# Patient Record
Sex: FEMALE | Race: WHITE | Employment: FULL TIME | URBAN - NONMETROPOLITAN AREA
[De-identification: names, ages, dates, MRNs, and addresses within clinical notes are randomized per-mention and may not be internally consistent; named-entity substitution may affect disease eponyms.]

---

## 2018-04-15 ENCOUNTER — HOSPITAL ENCOUNTER (OUTPATIENT)
Age: 58
Discharge: HOME | End: 2018-04-15
Payer: COMMERCIAL

## 2018-04-15 LAB
ALBUMIN SERPL-MCNC: 3.9 G/DL (ref 3.5–5.2)
ALBUMIN/GLOBULIN RATIO: ABNORMAL (ref 1–2.5)
ALP BLD-CCNC: 117 U/L (ref 35–104)
ALT SERPL-CCNC: 21 U/L (ref 5–33)
ANION GAP SERPL CALCULATED.3IONS-SCNC: 13 MMOL/L (ref 9–17)
AST SERPL-CCNC: 15 U/L
BILIRUB SERPL-MCNC: 0.55 MG/DL (ref 0.3–1.2)
BUN BLDV-MCNC: 16 MG/DL (ref 6–20)
BUN/CREAT BLD: 27 (ref 9–20)
CALCIUM SERPL-MCNC: 8.9 MG/DL (ref 8.6–10.4)
CHLORIDE BLD-SCNC: 100 MMOL/L (ref 98–107)
CO2: 27 MMOL/L (ref 20–31)
CREAT SERPL-MCNC: 0.59 MG/DL (ref 0.5–0.9)
FOLATE: 14.5 NG/ML
GFR AFRICAN AMERICAN: >60 ML/MIN
GFR NON-AFRICAN AMERICAN: >60 ML/MIN
GFR SERPL CREATININE-BSD FRML MDRD: ABNORMAL ML/MIN/{1.73_M2}
GFR SERPL CREATININE-BSD FRML MDRD: ABNORMAL ML/MIN/{1.73_M2}
GLUCOSE BLD-MCNC: 222 MG/DL (ref 70–99)
HCT VFR BLD CALC: 41.5 % (ref 36–46)
HEMOGLOBIN: 14 G/DL (ref 12–16)
MCH RBC QN AUTO: 29.1 PG (ref 26–34)
MCHC RBC AUTO-ENTMCNC: 33.7 G/DL (ref 31–37)
MCV RBC AUTO: 86.4 FL (ref 80–100)
NRBC AUTOMATED: NORMAL PER 100 WBC
PDW BLD-RTO: 13.7 % (ref 12.1–15.2)
PLATELET # BLD: 231 K/UL (ref 140–450)
PMV BLD AUTO: NORMAL FL (ref 6–12)
POTASSIUM SERPL-SCNC: 4 MMOL/L (ref 3.7–5.3)
RBC # BLD: 4.81 M/UL (ref 4–5.2)
SODIUM BLD-SCNC: 140 MMOL/L (ref 135–144)
THYROXINE, FREE: 1.2 NG/DL (ref 0.93–1.7)
TOTAL PROTEIN: 7.3 G/DL (ref 6.4–8.3)
TSH SERPL DL<=0.05 MIU/L-ACNC: 0.01 MIU/L (ref 0.3–5)
VITAMIN B-12: 282 PG/ML (ref 232–1245)
VITAMIN D 25-HYDROXY: 17.3 NG/ML (ref 30–100)
WBC # BLD: 6.3 K/UL (ref 3.5–11)

## 2018-04-15 PROCEDURE — 84432 ASSAY OF THYROGLOBULIN: CPT

## 2018-04-15 PROCEDURE — 85027 COMPLETE CBC AUTOMATED: CPT

## 2018-04-15 PROCEDURE — 86800 THYROGLOBULIN ANTIBODY: CPT

## 2018-04-15 PROCEDURE — 82306 VITAMIN D 25 HYDROXY: CPT

## 2018-04-15 PROCEDURE — 80053 COMPREHEN METABOLIC PANEL: CPT

## 2018-04-15 PROCEDURE — 82607 VITAMIN B-12: CPT

## 2018-04-15 PROCEDURE — 84439 ASSAY OF FREE THYROXINE: CPT

## 2018-04-15 PROCEDURE — 36415 COLL VENOUS BLD VENIPUNCTURE: CPT

## 2018-04-15 PROCEDURE — 84443 ASSAY THYROID STIM HORMONE: CPT

## 2018-04-15 PROCEDURE — 82746 ASSAY OF FOLIC ACID SERUM: CPT

## 2018-04-16 LAB
THYROGLOBULIN AB: <20 IU/ML (ref 0–40)
THYROGLOBULIN: <0.2 NG/ML (ref 0–63.4)

## 2018-07-21 ENCOUNTER — HOSPITAL ENCOUNTER (OUTPATIENT)
Age: 58
Discharge: HOME | End: 2018-07-21
Payer: COMMERCIAL

## 2018-07-21 LAB
ALBUMIN SERPL-MCNC: 4.1 G/DL (ref 3.5–5.2)
ALBUMIN/GLOBULIN RATIO: ABNORMAL (ref 1–2.5)
ALP BLD-CCNC: 106 U/L (ref 35–104)
ALT SERPL-CCNC: 16 U/L (ref 5–33)
ANION GAP SERPL CALCULATED.3IONS-SCNC: 16 MMOL/L (ref 9–17)
AST SERPL-CCNC: 12 U/L
BILIRUB SERPL-MCNC: 0.4 MG/DL (ref 0.3–1.2)
BUN BLDV-MCNC: 16 MG/DL (ref 6–20)
BUN/CREAT BLD: 25 (ref 9–20)
CALCIUM SERPL-MCNC: 9.5 MG/DL (ref 8.6–10.4)
CHLORIDE BLD-SCNC: 98 MMOL/L (ref 98–107)
CO2: 25 MMOL/L (ref 20–31)
CREAT SERPL-MCNC: 0.65 MG/DL (ref 0.5–0.9)
ESTIMATED AVERAGE GLUCOSE: 171 MG/DL
GFR AFRICAN AMERICAN: >60 ML/MIN
GFR NON-AFRICAN AMERICAN: >60 ML/MIN
GFR SERPL CREATININE-BSD FRML MDRD: ABNORMAL ML/MIN/{1.73_M2}
GFR SERPL CREATININE-BSD FRML MDRD: ABNORMAL ML/MIN/{1.73_M2}
GLUCOSE BLD-MCNC: 165 MG/DL (ref 70–99)
HBA1C MFR BLD: 7.6 % (ref 4.8–5.9)
POTASSIUM SERPL-SCNC: 4.4 MMOL/L (ref 3.7–5.3)
SODIUM BLD-SCNC: 139 MMOL/L (ref 135–144)
THYROXINE, FREE: 1.37 NG/DL (ref 0.93–1.7)
TOTAL PROTEIN: 7.7 G/DL (ref 6.4–8.3)
TSH SERPL DL<=0.05 MIU/L-ACNC: 0.02 MIU/L (ref 0.3–5)
VITAMIN D 25-HYDROXY: 26.8 NG/ML (ref 30–100)

## 2018-07-21 PROCEDURE — 84439 ASSAY OF FREE THYROXINE: CPT

## 2018-07-21 PROCEDURE — 80053 COMPREHEN METABOLIC PANEL: CPT

## 2018-07-21 PROCEDURE — 83036 HEMOGLOBIN GLYCOSYLATED A1C: CPT

## 2018-07-21 PROCEDURE — 84443 ASSAY THYROID STIM HORMONE: CPT

## 2018-07-21 PROCEDURE — 36415 COLL VENOUS BLD VENIPUNCTURE: CPT

## 2018-07-21 PROCEDURE — 82306 VITAMIN D 25 HYDROXY: CPT

## 2018-07-25 ENCOUNTER — HOSPITAL ENCOUNTER (OUTPATIENT)
Dept: MAMMOGRAPHY | Age: 58
Discharge: HOME | End: 2018-07-27
Payer: COMMERCIAL

## 2018-07-25 DIAGNOSIS — Z12.39 BREAST CANCER SCREENING: ICD-10-CM

## 2018-07-25 PROCEDURE — 77067 SCR MAMMO BI INCL CAD: CPT

## 2018-08-09 ENCOUNTER — HOSPITAL ENCOUNTER (OUTPATIENT)
Dept: PREADMISSION TESTING | Age: 58
Discharge: HOME | End: 2018-08-13
Payer: COMMERCIAL

## 2018-08-09 VITALS
TEMPERATURE: 97.8 F | HEIGHT: 63 IN | BODY MASS INDEX: 42.38 KG/M2 | RESPIRATION RATE: 18 BRPM | SYSTOLIC BLOOD PRESSURE: 132 MMHG | WEIGHT: 239.2 LBS | DIASTOLIC BLOOD PRESSURE: 89 MMHG | HEART RATE: 90 BPM | OXYGEN SATURATION: 98 %

## 2018-08-09 LAB
ABSOLUTE EOS #: 0.26 K/UL (ref 0–0.44)
ABSOLUTE IMMATURE GRANULOCYTE: <0.03 K/UL (ref 0–0.3)
ABSOLUTE LYMPH #: 2.39 K/UL (ref 1.1–3.7)
ABSOLUTE MONO #: 0.44 K/UL (ref 0.1–1.2)
BASOPHILS # BLD: 1 % (ref 0–2)
BASOPHILS ABSOLUTE: 0.04 K/UL (ref 0–0.2)
DIFFERENTIAL TYPE: NORMAL
EKG ATRIAL RATE: 82 BPM
EKG P AXIS: 49 DEGREES
EKG P-R INTERVAL: 180 MS
EKG Q-T INTERVAL: 392 MS
EKG QRS DURATION: 94 MS
EKG QTC CALCULATION (BAZETT): 457 MS
EKG R AXIS: 36 DEGREES
EKG T AXIS: 18 DEGREES
EKG VENTRICULAR RATE: 82 BPM
EOSINOPHILS RELATIVE PERCENT: 4 % (ref 1–4)
HCT VFR BLD CALC: 41.4 % (ref 36.3–47.1)
HEMOGLOBIN: 13.6 G/DL (ref 11.9–15.1)
IMMATURE GRANULOCYTES: 0 %
INR BLD: 1 (ref 0.9–1.2)
LYMPHOCYTES # BLD: 40 % (ref 24–43)
MCH RBC QN AUTO: 29.1 PG (ref 25.2–33.5)
MCHC RBC AUTO-ENTMCNC: 32.9 G/DL (ref 28.4–34.8)
MCV RBC AUTO: 88.5 FL (ref 82.6–102.9)
MONOCYTES # BLD: 7 % (ref 3–12)
MRSA, DNA, NASAL: NORMAL
NRBC AUTOMATED: 0 PER 100 WBC
PARTIAL THROMBOPLASTIN TIME: 27.7 SEC (ref 23.2–34.4)
PDW BLD-RTO: 13.2 % (ref 11.8–14.4)
PLATELET # BLD: 200 K/UL (ref 138–453)
PLATELET ESTIMATE: NORMAL
PMV BLD AUTO: 10.4 FL (ref 8.1–13.5)
PROTHROMBIN TIME: 10.6 SEC (ref 9.7–12.2)
RBC # BLD: 4.68 M/UL (ref 3.95–5.11)
RBC # BLD: NORMAL 10*6/UL
SEG NEUTROPHILS: 48 % (ref 36–65)
SEGMENTED NEUTROPHILS ABSOLUTE COUNT: 2.8 K/UL (ref 1.5–8.1)
SPECIMEN DESCRIPTION: NORMAL
WBC # BLD: 6 K/UL (ref 3.5–11.3)
WBC # BLD: NORMAL 10*3/UL

## 2018-08-09 PROCEDURE — 85610 PROTHROMBIN TIME: CPT

## 2018-08-09 PROCEDURE — 36415 COLL VENOUS BLD VENIPUNCTURE: CPT

## 2018-08-09 PROCEDURE — 85730 THROMBOPLASTIN TIME PARTIAL: CPT

## 2018-08-09 PROCEDURE — 87641 MR-STAPH DNA AMP PROBE: CPT

## 2018-08-09 PROCEDURE — 93005 ELECTROCARDIOGRAM TRACING: CPT

## 2018-08-09 PROCEDURE — 85025 COMPLETE CBC W/AUTO DIFF WBC: CPT

## 2018-08-09 RX ORDER — IBUPROFEN 200 MG
400 TABLET ORAL EVERY 6 HOURS PRN
COMMUNITY
End: 2019-05-21 | Stop reason: ALTCHOICE

## 2018-08-09 RX ORDER — DILTIAZEM HYDROCHLORIDE 180 MG/1
180 CAPSULE, COATED, EXTENDED RELEASE ORAL DAILY
COMMUNITY
End: 2021-02-15 | Stop reason: SDUPTHER

## 2018-08-09 RX ORDER — FUROSEMIDE 20 MG/1
20 TABLET ORAL DAILY
Status: ON HOLD | COMMUNITY
End: 2018-09-12 | Stop reason: DRUGHIGH

## 2018-08-09 RX ORDER — LEVOTHYROXINE AND LIOTHYRONINE 38; 9 UG/1; UG/1
60 TABLET ORAL DAILY
COMMUNITY
End: 2018-10-31 | Stop reason: CLARIF

## 2018-08-09 RX ORDER — TRANEXAMIC ACID 650 1/1
1950 TABLET ORAL ONCE
Status: CANCELLED | OUTPATIENT
Start: 2018-08-09

## 2018-09-07 ENCOUNTER — HOSPITAL ENCOUNTER (OUTPATIENT)
Dept: LAB | Age: 58
Discharge: HOME | End: 2018-09-07
Payer: COMMERCIAL

## 2018-09-07 LAB
ABO/RH: NORMAL
ANTIBODY SCREEN: NEGATIVE
ARM BAND NUMBER: NORMAL
EXPIRATION DATE: NORMAL

## 2018-09-07 PROCEDURE — 36415 COLL VENOUS BLD VENIPUNCTURE: CPT

## 2018-09-07 PROCEDURE — 86901 BLOOD TYPING SEROLOGIC RH(D): CPT

## 2018-09-07 PROCEDURE — 86850 RBC ANTIBODY SCREEN: CPT

## 2018-09-07 PROCEDURE — 86900 BLOOD TYPING SEROLOGIC ABO: CPT

## 2018-09-10 ENCOUNTER — ANESTHESIA EVENT (OUTPATIENT)
Dept: OPERATING ROOM | Age: 58
End: 2018-09-10
Payer: COMMERCIAL

## 2018-09-11 ENCOUNTER — APPOINTMENT (OUTPATIENT)
Dept: GENERAL RADIOLOGY | Age: 58
End: 2018-09-11
Attending: ORTHOPAEDIC SURGERY
Payer: COMMERCIAL

## 2018-09-11 ENCOUNTER — ANESTHESIA (OUTPATIENT)
Dept: OPERATING ROOM | Age: 58
End: 2018-09-11
Payer: COMMERCIAL

## 2018-09-11 ENCOUNTER — HOSPITAL ENCOUNTER (OUTPATIENT)
Age: 58
Setting detail: OBSERVATION
LOS: 1 days | Discharge: HOME | End: 2018-09-12
Attending: ORTHOPAEDIC SURGERY | Admitting: ORTHOPAEDIC SURGERY
Payer: COMMERCIAL

## 2018-09-11 VITALS
RESPIRATION RATE: 33 BRPM | DIASTOLIC BLOOD PRESSURE: 72 MMHG | SYSTOLIC BLOOD PRESSURE: 129 MMHG | TEMPERATURE: 99.1 F | OXYGEN SATURATION: 84 %

## 2018-09-11 DIAGNOSIS — G89.18 POST-OP PAIN: Primary | ICD-10-CM

## 2018-09-11 PROBLEM — Z98.890 S/P KNEE SURGERY: Status: ACTIVE | Noted: 2018-09-11

## 2018-09-11 LAB
GLUCOSE BLD-MCNC: 101 MG/DL (ref 74–100)
GLUCOSE BLD-MCNC: 321 MG/DL (ref 74–100)

## 2018-09-11 PROCEDURE — 3700000001 HC ADD 15 MINUTES (ANESTHESIA): Performed by: ORTHOPAEDIC SURGERY

## 2018-09-11 PROCEDURE — C9290 INJ, BUPIVACAINE LIPOSOME: HCPCS | Performed by: ORTHOPAEDIC SURGERY

## 2018-09-11 PROCEDURE — 3600000005 HC SURGERY LEVEL 5 BASE: Performed by: ORTHOPAEDIC SURGERY

## 2018-09-11 PROCEDURE — 6360000002 HC RX W HCPCS: Performed by: ORTHOPAEDIC SURGERY

## 2018-09-11 PROCEDURE — G0378 HOSPITAL OBSERVATION PER HR: HCPCS

## 2018-09-11 PROCEDURE — 2580000003 HC RX 258: Performed by: ORTHOPAEDIC SURGERY

## 2018-09-11 PROCEDURE — 6360000002 HC RX W HCPCS: Performed by: NURSE ANESTHETIST, CERTIFIED REGISTERED

## 2018-09-11 PROCEDURE — 73560 X-RAY EXAM OF KNEE 1 OR 2: CPT

## 2018-09-11 PROCEDURE — 2500000003 HC RX 250 WO HCPCS: Performed by: NURSE ANESTHETIST, CERTIFIED REGISTERED

## 2018-09-11 PROCEDURE — C1713 ANCHOR/SCREW BN/BN,TIS/BN: HCPCS | Performed by: ORTHOPAEDIC SURGERY

## 2018-09-11 PROCEDURE — 7100000000 HC PACU RECOVERY - FIRST 15 MIN: Performed by: ORTHOPAEDIC SURGERY

## 2018-09-11 PROCEDURE — 7100000001 HC PACU RECOVERY - ADDTL 15 MIN: Performed by: ORTHOPAEDIC SURGERY

## 2018-09-11 PROCEDURE — C1729 CATH, DRAINAGE: HCPCS | Performed by: ORTHOPAEDIC SURGERY

## 2018-09-11 PROCEDURE — 3600000015 HC SURGERY LEVEL 5 ADDTL 15MIN: Performed by: ORTHOPAEDIC SURGERY

## 2018-09-11 PROCEDURE — C1776 JOINT DEVICE (IMPLANTABLE): HCPCS | Performed by: ORTHOPAEDIC SURGERY

## 2018-09-11 PROCEDURE — 82947 ASSAY GLUCOSE BLOOD QUANT: CPT

## 2018-09-11 PROCEDURE — 6370000000 HC RX 637 (ALT 250 FOR IP): Performed by: ORTHOPAEDIC SURGERY

## 2018-09-11 PROCEDURE — 6370000000 HC RX 637 (ALT 250 FOR IP): Performed by: INTERNAL MEDICINE

## 2018-09-11 PROCEDURE — 76942 ECHO GUIDE FOR BIOPSY: CPT | Performed by: NURSE ANESTHETIST, CERTIFIED REGISTERED

## 2018-09-11 PROCEDURE — 2709999900 HC NON-CHARGEABLE SUPPLY: Performed by: ORTHOPAEDIC SURGERY

## 2018-09-11 PROCEDURE — 3700000000 HC ANESTHESIA ATTENDED CARE: Performed by: ORTHOPAEDIC SURGERY

## 2018-09-11 DEVICE — GENESIS II NON-POROUS TIBIAL                                    BASEPLATE SIZE 3 LEFT
Type: IMPLANTABLE DEVICE | Site: KNEE | Status: FUNCTIONAL
Brand: GENESIS II

## 2018-09-11 DEVICE — GENESIS II OVAL RESURFACING                                    PATELLAR 35MM
Type: IMPLANTABLE DEVICE | Site: KNEE | Status: FUNCTIONAL
Brand: GENESIS II

## 2018-09-11 DEVICE — DISCONTINUED USE 415964 CEMENT PALACOS R + G SING DOSE 40GR: Type: IMPLANTABLE DEVICE | Site: KNEE | Status: FUNCTIONAL

## 2018-09-11 RX ORDER — DEXAMETHASONE SODIUM PHOSPHATE 10 MG/ML
INJECTION INTRAMUSCULAR; INTRAVENOUS PRN
Status: DISCONTINUED | OUTPATIENT
Start: 2018-09-11 | End: 2018-09-11 | Stop reason: SDUPTHER

## 2018-09-11 RX ORDER — SODIUM CHLORIDE, SODIUM LACTATE, POTASSIUM CHLORIDE, CALCIUM CHLORIDE 600; 310; 30; 20 MG/100ML; MG/100ML; MG/100ML; MG/100ML
INJECTION, SOLUTION INTRAVENOUS CONTINUOUS
Status: DISCONTINUED | OUTPATIENT
Start: 2018-09-11 | End: 2018-09-11

## 2018-09-11 RX ORDER — MORPHINE SULFATE 2 MG/ML
4 INJECTION, SOLUTION INTRAMUSCULAR; INTRAVENOUS
Status: DISCONTINUED | OUTPATIENT
Start: 2018-09-11 | End: 2018-09-12 | Stop reason: HOSPADM

## 2018-09-11 RX ORDER — LABETALOL HYDROCHLORIDE 5 MG/ML
INJECTION, SOLUTION INTRAVENOUS PRN
Status: DISCONTINUED | OUTPATIENT
Start: 2018-09-11 | End: 2018-09-11 | Stop reason: SDUPTHER

## 2018-09-11 RX ORDER — NICOTINE POLACRILEX 4 MG
15 LOZENGE BUCCAL PRN
Status: DISCONTINUED | OUTPATIENT
Start: 2018-09-11 | End: 2018-09-12 | Stop reason: HOSPADM

## 2018-09-11 RX ORDER — FENTANYL CITRATE 50 UG/ML
25 INJECTION, SOLUTION INTRAMUSCULAR; INTRAVENOUS EVERY 5 MIN PRN
Status: DISCONTINUED | OUTPATIENT
Start: 2018-09-11 | End: 2018-09-11 | Stop reason: HOSPADM

## 2018-09-11 RX ORDER — BUPIVACAINE HYDROCHLORIDE 7.5 MG/ML
INJECTION, SOLUTION INTRASPINAL PRN
Status: DISCONTINUED | OUTPATIENT
Start: 2018-09-11 | End: 2018-09-11

## 2018-09-11 RX ORDER — SODIUM CHLORIDE 0.9 % (FLUSH) 0.9 %
10 SYRINGE (ML) INJECTION EVERY 12 HOURS SCHEDULED
Status: DISCONTINUED | OUTPATIENT
Start: 2018-09-11 | End: 2018-09-12 | Stop reason: HOSPADM

## 2018-09-11 RX ORDER — GLYCOPYRROLATE 1 MG/5 ML
SYRINGE (ML) INTRAVENOUS PRN
Status: DISCONTINUED | OUTPATIENT
Start: 2018-09-11 | End: 2018-09-11 | Stop reason: SDUPTHER

## 2018-09-11 RX ORDER — ACETAMINOPHEN 325 MG/1
650 TABLET ORAL EVERY 4 HOURS PRN
Status: DISCONTINUED | OUTPATIENT
Start: 2018-09-11 | End: 2018-09-12 | Stop reason: HOSPADM

## 2018-09-11 RX ORDER — GENTAMICIN SULFATE 40 MG/ML
INJECTION, SOLUTION INTRAMUSCULAR; INTRAVENOUS PRN
Status: DISCONTINUED | OUTPATIENT
Start: 2018-09-11 | End: 2018-09-11 | Stop reason: HOSPADM

## 2018-09-11 RX ORDER — LEVOTHYROXINE AND LIOTHYRONINE 9.5; 2.25 UG/1; UG/1
60 TABLET ORAL DAILY
Status: DISCONTINUED | OUTPATIENT
Start: 2018-09-11 | End: 2018-09-12

## 2018-09-11 RX ORDER — MORPHINE SULFATE 2 MG/ML
2 INJECTION, SOLUTION INTRAMUSCULAR; INTRAVENOUS
Status: DISCONTINUED | OUTPATIENT
Start: 2018-09-11 | End: 2018-09-12 | Stop reason: HOSPADM

## 2018-09-11 RX ORDER — ONDANSETRON 4 MG/1
4 TABLET, ORALLY DISINTEGRATING ORAL ONCE
Status: COMPLETED | OUTPATIENT
Start: 2018-09-11 | End: 2018-09-11

## 2018-09-11 RX ORDER — DEXTROSE MONOHYDRATE 25 G/50ML
12.5 INJECTION, SOLUTION INTRAVENOUS PRN
Status: DISCONTINUED | OUTPATIENT
Start: 2018-09-11 | End: 2018-09-12 | Stop reason: HOSPADM

## 2018-09-11 RX ORDER — FENTANYL CITRATE 50 UG/ML
50 INJECTION, SOLUTION INTRAMUSCULAR; INTRAVENOUS EVERY 5 MIN PRN
Status: DISCONTINUED | OUTPATIENT
Start: 2018-09-11 | End: 2018-09-11 | Stop reason: HOSPADM

## 2018-09-11 RX ORDER — DIMENHYDRINATE 50 MG/1
50 TABLET ORAL ONCE
Status: COMPLETED | OUTPATIENT
Start: 2018-09-11 | End: 2018-09-11

## 2018-09-11 RX ORDER — HYDROCODONE BITARTRATE AND ACETAMINOPHEN 5; 325 MG/1; MG/1
1 TABLET ORAL EVERY 4 HOURS PRN
Status: DISCONTINUED | OUTPATIENT
Start: 2018-09-11 | End: 2018-09-12 | Stop reason: HOSPADM

## 2018-09-11 RX ORDER — ACETAMINOPHEN 325 MG/1
650 TABLET ORAL ONCE
Status: COMPLETED | OUTPATIENT
Start: 2018-09-11 | End: 2018-09-11

## 2018-09-11 RX ORDER — FENTANYL CITRATE 50 UG/ML
INJECTION, SOLUTION INTRAMUSCULAR; INTRAVENOUS PRN
Status: DISCONTINUED | OUTPATIENT
Start: 2018-09-11 | End: 2018-09-11 | Stop reason: SDUPTHER

## 2018-09-11 RX ORDER — SODIUM CHLORIDE 0.9 % (FLUSH) 0.9 %
10 SYRINGE (ML) INJECTION PRN
Status: DISCONTINUED | OUTPATIENT
Start: 2018-09-11 | End: 2018-09-12 | Stop reason: HOSPADM

## 2018-09-11 RX ORDER — ONDANSETRON 2 MG/ML
4 INJECTION INTRAMUSCULAR; INTRAVENOUS EVERY 6 HOURS PRN
Status: DISCONTINUED | OUTPATIENT
Start: 2018-09-11 | End: 2018-09-12 | Stop reason: HOSPADM

## 2018-09-11 RX ORDER — TRANEXAMIC ACID 650 1/1
1950 TABLET ORAL ONCE
Status: COMPLETED | OUTPATIENT
Start: 2018-09-11 | End: 2018-09-11

## 2018-09-11 RX ORDER — GLIPIZIDE 5 MG/1
5 TABLET ORAL DAILY
Status: ON HOLD | COMMUNITY
End: 2018-09-12 | Stop reason: ALTCHOICE

## 2018-09-11 RX ORDER — METOCLOPRAMIDE HYDROCHLORIDE 5 MG/ML
10 INJECTION INTRAMUSCULAR; INTRAVENOUS
Status: COMPLETED | OUTPATIENT
Start: 2018-09-11 | End: 2018-09-11

## 2018-09-11 RX ORDER — DILTIAZEM HYDROCHLORIDE 180 MG/1
180 CAPSULE, COATED, EXTENDED RELEASE ORAL DAILY
Status: DISCONTINUED | OUTPATIENT
Start: 2018-09-11 | End: 2018-09-12 | Stop reason: HOSPADM

## 2018-09-11 RX ORDER — PROPOFOL 10 MG/ML
INJECTION, EMULSION INTRAVENOUS CONTINUOUS PRN
Status: DISCONTINUED | OUTPATIENT
Start: 2018-09-11 | End: 2018-09-11 | Stop reason: SDUPTHER

## 2018-09-11 RX ORDER — GLIPIZIDE 5 MG/1
5 TABLET ORAL DAILY
Status: DISCONTINUED | OUTPATIENT
Start: 2018-09-11 | End: 2018-09-12

## 2018-09-11 RX ORDER — SODIUM CHLORIDE 9 MG/ML
INJECTION, SOLUTION INTRAVENOUS CONTINUOUS
Status: DISCONTINUED | OUTPATIENT
Start: 2018-09-11 | End: 2018-09-12

## 2018-09-11 RX ORDER — MIDAZOLAM HYDROCHLORIDE 1 MG/ML
INJECTION INTRAMUSCULAR; INTRAVENOUS PRN
Status: DISCONTINUED | OUTPATIENT
Start: 2018-09-11 | End: 2018-09-11 | Stop reason: SDUPTHER

## 2018-09-11 RX ORDER — FUROSEMIDE 20 MG/1
20 TABLET ORAL DAILY
Status: DISCONTINUED | OUTPATIENT
Start: 2018-09-11 | End: 2018-09-12 | Stop reason: DRUGHIGH

## 2018-09-11 RX ORDER — NAPROXEN 500 MG/1
500 TABLET ORAL 2 TIMES DAILY WITH MEALS
Status: DISCONTINUED | OUTPATIENT
Start: 2018-09-11 | End: 2018-09-12 | Stop reason: HOSPADM

## 2018-09-11 RX ORDER — ROPIVACAINE HYDROCHLORIDE 5 MG/ML
INJECTION, SOLUTION EPIDURAL; INFILTRATION; PERINEURAL PRN
Status: DISCONTINUED | OUTPATIENT
Start: 2018-09-11 | End: 2018-09-11 | Stop reason: SDUPTHER

## 2018-09-11 RX ORDER — DEXTROSE MONOHYDRATE 50 MG/ML
100 INJECTION, SOLUTION INTRAVENOUS PRN
Status: DISCONTINUED | OUTPATIENT
Start: 2018-09-11 | End: 2018-09-12 | Stop reason: HOSPADM

## 2018-09-11 RX ORDER — BISACODYL 10 MG
10 SUPPOSITORY, RECTAL RECTAL DAILY PRN
Status: DISCONTINUED | OUTPATIENT
Start: 2018-09-11 | End: 2018-09-12 | Stop reason: HOSPADM

## 2018-09-11 RX ORDER — HYDROCODONE BITARTRATE AND ACETAMINOPHEN 5; 325 MG/1; MG/1
2 TABLET ORAL EVERY 4 HOURS PRN
Status: DISCONTINUED | OUTPATIENT
Start: 2018-09-11 | End: 2018-09-12 | Stop reason: HOSPADM

## 2018-09-11 RX ADMIN — Medication 2 G: at 13:11

## 2018-09-11 RX ADMIN — CEFAZOLIN SODIUM 2 G: 2 SOLUTION INTRAVENOUS at 21:46

## 2018-09-11 RX ADMIN — SODIUM CHLORIDE: 9 INJECTION, SOLUTION INTRAVENOUS at 19:09

## 2018-09-11 RX ADMIN — FENTANYL CITRATE 25 MCG: 50 INJECTION, SOLUTION INTRAMUSCULAR; INTRAVENOUS at 16:45

## 2018-09-11 RX ADMIN — ROPIVACAINE HYDROCHLORIDE 15 ML: 5 INJECTION, SOLUTION EPIDURAL; INFILTRATION; PERINEURAL at 12:54

## 2018-09-11 RX ADMIN — ROPIVACAINE HYDROCHLORIDE 15 ML: 5 INJECTION, SOLUTION EPIDURAL; INFILTRATION; PERINEURAL at 12:56

## 2018-09-11 RX ADMIN — ONDANSETRON 4 MG: 4 TABLET, ORALLY DISINTEGRATING ORAL at 11:49

## 2018-09-11 RX ADMIN — SODIUM CHLORIDE, POTASSIUM CHLORIDE, SODIUM LACTATE AND CALCIUM CHLORIDE: 600; 310; 30; 20 INJECTION, SOLUTION INTRAVENOUS at 11:50

## 2018-09-11 RX ADMIN — DEXAMETHASONE SODIUM PHOSPHATE 10 MG: 10 INJECTION INTRAMUSCULAR; INTRAVENOUS at 12:54

## 2018-09-11 RX ADMIN — FENTANYL CITRATE 25 MCG: 50 INJECTION, SOLUTION INTRAMUSCULAR; INTRAVENOUS at 17:04

## 2018-09-11 RX ADMIN — TRANEXAMIC ACID 1950 MG: 650 TABLET ORAL at 11:48

## 2018-09-11 RX ADMIN — FENTANYL CITRATE 100 MCG: 50 INJECTION INTRAMUSCULAR; INTRAVENOUS at 12:50

## 2018-09-11 RX ADMIN — FENTANYL CITRATE 50 MCG: 50 INJECTION, SOLUTION INTRAMUSCULAR; INTRAVENOUS at 16:27

## 2018-09-11 RX ADMIN — HYDROCODONE BITARTRATE AND ACETAMINOPHEN 2 TABLET: 5; 325 TABLET ORAL at 18:57

## 2018-09-11 RX ADMIN — NAPROXEN 500 MG: 500 TABLET ORAL at 21:45

## 2018-09-11 RX ADMIN — PROPOFOL 100 MCG/KG/MIN: 10 INJECTION, EMULSION INTRAVENOUS at 13:20

## 2018-09-11 RX ADMIN — ONDANSETRON 4 MG: 2 INJECTION INTRAMUSCULAR; INTRAVENOUS at 23:41

## 2018-09-11 RX ADMIN — MIDAZOLAM HYDROCHLORIDE 2 MG: 1 INJECTION, SOLUTION INTRAMUSCULAR; INTRAVENOUS at 12:49

## 2018-09-11 RX ADMIN — INSULIN LISPRO 2 UNITS: 100 INJECTION, SOLUTION INTRAVENOUS; SUBCUTANEOUS at 21:46

## 2018-09-11 RX ADMIN — LABETALOL HYDROCHLORIDE 5 MG: 5 INJECTION, SOLUTION INTRAVENOUS at 15:46

## 2018-09-11 RX ADMIN — METOCLOPRAMIDE 10 MG: 5 INJECTION, SOLUTION INTRAMUSCULAR; INTRAVENOUS at 16:52

## 2018-09-11 RX ADMIN — HYDROCODONE BITARTRATE AND ACETAMINOPHEN 2 TABLET: 5; 325 TABLET ORAL at 23:41

## 2018-09-11 RX ADMIN — METFORMIN HYDROCHLORIDE 500 MG: 500 TABLET ORAL at 21:28

## 2018-09-11 RX ADMIN — DIMENHYDRINATE 50 MG: 50 TABLET ORAL at 11:50

## 2018-09-11 RX ADMIN — Medication 0.2 MG: at 13:51

## 2018-09-11 RX ADMIN — ACETAMINOPHEN 650 MG: 325 TABLET ORAL at 11:49

## 2018-09-11 ASSESSMENT — PAIN DESCRIPTION - ORIENTATION
ORIENTATION: LEFT

## 2018-09-11 ASSESSMENT — PULMONARY FUNCTION TESTS
PIF_VALUE: 12
PIF_VALUE: 11
PIF_VALUE: 0
PIF_VALUE: 10
PIF_VALUE: 1
PIF_VALUE: 1
PIF_VALUE: 11
PIF_VALUE: 11
PIF_VALUE: 1
PIF_VALUE: 1
PIF_VALUE: 11
PIF_VALUE: 12
PIF_VALUE: 2
PIF_VALUE: 1
PIF_VALUE: 11
PIF_VALUE: 1
PIF_VALUE: 16
PIF_VALUE: 5
PIF_VALUE: 1
PIF_VALUE: 2
PIF_VALUE: 1
PIF_VALUE: 12
PIF_VALUE: 1
PIF_VALUE: 11
PIF_VALUE: 10
PIF_VALUE: 13
PIF_VALUE: 2
PIF_VALUE: 11
PIF_VALUE: 1
PIF_VALUE: 11
PIF_VALUE: 1
PIF_VALUE: 1
PIF_VALUE: 2
PIF_VALUE: 10
PIF_VALUE: 32
PIF_VALUE: 1
PIF_VALUE: 18
PIF_VALUE: 10
PIF_VALUE: 11
PIF_VALUE: 11
PIF_VALUE: 18
PIF_VALUE: 12
PIF_VALUE: 1
PIF_VALUE: 1
PIF_VALUE: 11
PIF_VALUE: 21
PIF_VALUE: 12
PIF_VALUE: 1
PIF_VALUE: 11
PIF_VALUE: 13
PIF_VALUE: 1
PIF_VALUE: 12
PIF_VALUE: 0
PIF_VALUE: 5
PIF_VALUE: 11
PIF_VALUE: 12
PIF_VALUE: 11
PIF_VALUE: 14
PIF_VALUE: 12
PIF_VALUE: 11
PIF_VALUE: 12
PIF_VALUE: 11
PIF_VALUE: 12
PIF_VALUE: 12
PIF_VALUE: 10
PIF_VALUE: 14
PIF_VALUE: 11
PIF_VALUE: 11
PIF_VALUE: 1
PIF_VALUE: 11
PIF_VALUE: 11
PIF_VALUE: 0
PIF_VALUE: 11
PIF_VALUE: 5
PIF_VALUE: 12
PIF_VALUE: 11
PIF_VALUE: 12
PIF_VALUE: 2
PIF_VALUE: 11
PIF_VALUE: 1
PIF_VALUE: 12
PIF_VALUE: 2
PIF_VALUE: 1
PIF_VALUE: 2
PIF_VALUE: 11
PIF_VALUE: 6
PIF_VALUE: 11
PIF_VALUE: 11
PIF_VALUE: 12
PIF_VALUE: 11
PIF_VALUE: 1
PIF_VALUE: 0
PIF_VALUE: 9
PIF_VALUE: 10
PIF_VALUE: 11
PIF_VALUE: 12
PIF_VALUE: 11
PIF_VALUE: 2
PIF_VALUE: 1
PIF_VALUE: 11
PIF_VALUE: 12
PIF_VALUE: 11
PIF_VALUE: 1
PIF_VALUE: 11
PIF_VALUE: 3
PIF_VALUE: 12
PIF_VALUE: 16
PIF_VALUE: 12
PIF_VALUE: 9
PIF_VALUE: 1
PIF_VALUE: 12
PIF_VALUE: 11
PIF_VALUE: 12
PIF_VALUE: 12
PIF_VALUE: 0
PIF_VALUE: 11
PIF_VALUE: 0
PIF_VALUE: 1
PIF_VALUE: 1
PIF_VALUE: 8
PIF_VALUE: 12
PIF_VALUE: 12
PIF_VALUE: 11
PIF_VALUE: 1
PIF_VALUE: 11
PIF_VALUE: 12
PIF_VALUE: 12
PIF_VALUE: 0
PIF_VALUE: 12
PIF_VALUE: 0
PIF_VALUE: 11
PIF_VALUE: 0
PIF_VALUE: 11
PIF_VALUE: 1
PIF_VALUE: 11
PIF_VALUE: 11
PIF_VALUE: 1
PIF_VALUE: 11
PIF_VALUE: 1
PIF_VALUE: 26
PIF_VALUE: 11
PIF_VALUE: 11
PIF_VALUE: 0
PIF_VALUE: 12
PIF_VALUE: 11
PIF_VALUE: 12
PIF_VALUE: 1
PIF_VALUE: 11
PIF_VALUE: 0
PIF_VALUE: 11
PIF_VALUE: 1
PIF_VALUE: 0
PIF_VALUE: 1
PIF_VALUE: 2
PIF_VALUE: 12
PIF_VALUE: 11
PIF_VALUE: 11

## 2018-09-11 ASSESSMENT — PAIN DESCRIPTION - PAIN TYPE
TYPE: SURGICAL PAIN

## 2018-09-11 ASSESSMENT — PAIN DESCRIPTION - LOCATION
LOCATION: KNEE

## 2018-09-11 ASSESSMENT — PAIN SCALES - GENERAL
PAINLEVEL_OUTOF10: 8
PAINLEVEL_OUTOF10: 6
PAINLEVEL_OUTOF10: 6
PAINLEVEL_OUTOF10: 8
PAINLEVEL_OUTOF10: 8
PAINLEVEL_OUTOF10: 2
PAINLEVEL_OUTOF10: 5
PAINLEVEL_OUTOF10: 2
PAINLEVEL_OUTOF10: 10
PAINLEVEL_OUTOF10: 3
PAINLEVEL_OUTOF10: 10
PAINLEVEL_OUTOF10: 5

## 2018-09-11 ASSESSMENT — PAIN DESCRIPTION - DESCRIPTORS
DESCRIPTORS: CONSTANT;ACHING
DESCRIPTORS: ACHING
DESCRIPTORS: SHARP

## 2018-09-11 ASSESSMENT — PAIN - FUNCTIONAL ASSESSMENT: PAIN_FUNCTIONAL_ASSESSMENT: 0-10

## 2018-09-11 NOTE — OP NOTE
excised to allow for lateral visualization. The ACL was then excised. The patient-specific distal femoral cutting block was then secured to the distal femur and the distal femoral cut was completed with soft tissue retractors in place. The PCL retractor was placed behind the tibia. Collateral retractors were placed medially and laterally. The patient-specific tibial cutting guide was then secured to the proximal tibia, and the proximal tibial cut was then completed with soft tissue retractors in place. The patient was then brought out to extension. We were unable to reach full extension, so additional 2 mm was resected from the distal femur. The knee was then provisionally balanced using extension blocks. Alignment arun was used to confirm coronal alignment of the tibial cut. The 4-in-1 cutting block was then applied and the distal femoral cuts were then completed with soft tissue retractors in place. The medial and lateral menisci were then removed along with osteophytes from the posterior condyles. The appropriately-sized tibial trial was then pinned into place and the femoral trial was implanted. Polyethylene trial was then placed within the knee joint. The patient was able to reach full extension to gravity flexion to 130 degrees. The knee was well-balanced in flexion, mid-flexion and extension. We then cut and prepared the patella, leaving a minimum thickness of 13 mm. The trial button was then placed. The patient was again taken through full range of motion. The knee was noted to be well-balanced. There was no patellar maltracking. The extremity was then exanguinated with an esmarch bandage and the tourniquet was inflated to 300 mm Hg. We then completed preparation of the femur and tibia. The knee was copiously irrigated with sterile saline impregnated with Ancef and gentamicin antibiotic.  Solution of liposomal bupivacaine and sterile saline was then injected around the posterior capsule and into the periosteum around the femur. Cement was then prepared on the back table under vacuum pressurization. The tibial, femoral and patellar components were then cemented into place, and the knee was held in extension with a polyethylene trial in place as the cement cured. Remaining solution of local anesthetic was then injected along anterior arthrotomy and subcutaneously along the incision. A total of 40 mL of injectable saline and 20 mL of 1.3% liposomal bupivicaine were injected throughout the case. After the cement hardened the knee was again taken through range of motion. The patient was noted to have full extension, flexion to 130 degrees. The knee was well-balanced in a coronal plane throughout motion. Therefore, the final polyethylene component was placed within the knee joint. The tourniquet was then released. Hemostasis was secured with electrocautery. The knee was again copiously irrigated. The arthrotomy was then closed with #1 Vicryl sutures and a running #2 barbed monofilament suture. Skin was closed with 2-0 Vicryl sutures and skin staples. Sterile dressing was applied. The operative drapes were then removed. The patient was removed from the operating room table and taken to the recovery room in stable condition. Patient had 2+ DP pulse with motor function intact in the recovery room.

## 2018-09-11 NOTE — ANESTHESIA PROCEDURE NOTES
Peripheral Block    Patient location during procedure: pre-op  Start time: 9/11/2018 12:48 PM  End time: 9/11/2018 12:56 PM  Staffing  Resident/CRNA: Dania FARIAS  Preanesthetic Checklist  Completed: patient identified, site marked, surgical consent, pre-op evaluation, timeout performed, IV checked, risks and benefits discussed, monitors and equipment checked, anesthesia consent given, oxygen available and patient being monitored  Peripheral Block  Patient position: supine  Prep: ChloraPrep  Patient monitoring: IV access and continuous pulse ox  Block type: Saphenous and iPacks  Laterality: left  Injection technique: single-shot  Procedures: ultrasound guided  Local infiltration: ropivacaine and decadron  Infiltration strength: 0.5 %  Dose: 40 mL  Provider prep: sterile gloves  Local infiltration: ropivacaine and decadron  Needle  Needle type: pajunk 80mm TAP.    Assessment  Injection assessment: negative aspiration for heme, no paresthesia on injection and local visualized surrounding nerve on ultrasound  Paresthesia pain: none  Slow fractionated injection: yes  Hemodynamics: stable  Reason for block: post-op pain management and at surgeon's request

## 2018-09-11 NOTE — ANESTHESIA PRE PROCEDURE
Arthritis     Diabetes mellitus ([facility] [address]     Tachycardia     Thyroid disease     Water retention        Past Surgical History:        Procedure Laterality Date    APPENDECTOMY      BREAST LUMPECTOMY Left 2011    CHOLECYSTECTOMY      COLONOSCOPY  2011    THYROIDECTOMY         Social History:    Social History   Substance Use Topics    Smoking status: Never Smoker    Smokeless tobacco: Never Used    Alcohol use No                                Counseling given: Not Answered      Vital Signs (Current):   Vitals:    09/11/18 1131   BP: (!) 142/81   Pulse: 103   Resp: 16   Temp: 36.3 °C (97.4 °F)   TempSrc: Temporal   SpO2: 95%   Weight: 239 lb (108.4 kg)   Height: 5' 3\" (1.6 m)                                              BP Readings from Last 3 Encounters:   09/11/18 (!) 142/81   08/09/18 132/89       NPO Status: Time of last liquid consumption: 2000                        Time of last solid consumption: 2000                        Date of last liquid consumption: 09/10/18                        Date of last solid food consumption: 09/10/18    BMI:   Wt Readings from Last 3 Encounters:   09/11/18 239 lb (108.4 kg)   08/09/18 239 lb 3.2 oz (108.5 kg)     Body mass index is 42.34 kg/m².     CBC:   Lab Results   Component Value Date    WBC 6.0 08/09/2018    RBC 4.68 08/09/2018    HGB 13.6 08/09/2018    HCT 41.4 08/09/2018    MCV 88.5 08/09/2018    RDW 13.2 08/09/2018     08/09/2018       CMP:   Lab Results   Component Value Date     07/21/2018    K 4.4 07/21/2018    CL 98 07/21/2018    CO2 25 07/21/2018    BUN 16 07/21/2018    CREATININE 0.65 07/21/2018    GFRAA >60 07/21/2018    LABGLOM >60 07/21/2018    GLUCOSE 165 07/21/2018    PROT 7.7 07/21/2018    CALCIUM 9.5 07/21/2018    BILITOT 0.40 07/21/2018    ALKPHOS 106 07/21/2018    AST 12 07/21/2018    ALT 16 07/21/2018       POC Tests:   Recent Labs      09/11/18   1155   POCGLU  101*       Coags:   Lab Results   Component Value Date    PROTIME 10.6 08/09/2018    INR 1.0 08/09/2018    APTT 27.7 08/09/2018       HCG (If Applicable): No results found for: PREGTESTUR, PREGSERUM, HCG, HCGQUANT     ABGs: No results found for: PHART, PO2ART, ZEX1OCD, JTD7ZAN, BEART, J4NHTOOW     Type & Screen (If Applicable):  No results found for: LABABO, LABRH    Anesthesia Evaluation   no history of anesthetic complications:   Airway: Mallampati: II  TM distance: >3 FB   Neck ROM: full  Mouth opening: > = 3 FB Dental:          Pulmonary:normal exam  breath sounds clear to auscultation  (+) sleep apnea: on CPAP,                             Cardiovascular:    (+) hypertension:,       ECG reviewed                        Neuro/Psych:   Negative Neuro/Psych ROS              GI/Hepatic/Renal:   (+) GERD: well controlled, morbid obesity          Endo/Other:    (+) Diabetes (prediabetes)Type II DM, , hypothyroidism::., .                 Abdominal:   (+) obese,         Vascular: negative vascular ROS. Anesthesia Plan      spinal     ASA 2     (Discussed PNB with pt consented)        Anesthetic plan and risks discussed with patient.                       GORDO Calix - CRNA   9/11/2018

## 2018-09-12 VITALS
WEIGHT: 239.3 LBS | BODY MASS INDEX: 42.4 KG/M2 | RESPIRATION RATE: 20 BRPM | HEART RATE: 105 BPM | HEIGHT: 63 IN | OXYGEN SATURATION: 96 % | SYSTOLIC BLOOD PRESSURE: 128 MMHG | TEMPERATURE: 97.9 F | DIASTOLIC BLOOD PRESSURE: 72 MMHG

## 2018-09-12 PROBLEM — G47.33 OSA (OBSTRUCTIVE SLEEP APNEA): Status: ACTIVE | Noted: 2018-09-12

## 2018-09-12 PROBLEM — Z92.3 H/O RADIOACTIVE IODINE THYROID ABLATION: Status: ACTIVE | Noted: 2018-09-12

## 2018-09-12 PROBLEM — Z86.79 H/O SINUS TACHYCARDIA: Status: ACTIVE | Noted: 2018-09-12

## 2018-09-12 LAB
GLUCOSE BLD-MCNC: 189 MG/DL (ref 74–100)
GLUCOSE BLD-MCNC: 238 MG/DL (ref 74–100)
HCT VFR BLD CALC: 36.4 % (ref 36.3–47.1)
HEMOGLOBIN: 12 G/DL (ref 11.9–15.1)

## 2018-09-12 PROCEDURE — G8978 MOBILITY CURRENT STATUS: HCPCS

## 2018-09-12 PROCEDURE — 85018 HEMOGLOBIN: CPT

## 2018-09-12 PROCEDURE — 97116 GAIT TRAINING THERAPY: CPT

## 2018-09-12 PROCEDURE — G8988 SELF CARE GOAL STATUS: HCPCS

## 2018-09-12 PROCEDURE — G8979 MOBILITY GOAL STATUS: HCPCS

## 2018-09-12 PROCEDURE — 6370000000 HC RX 637 (ALT 250 FOR IP): Performed by: ORTHOPAEDIC SURGERY

## 2018-09-12 PROCEDURE — 96374 THER/PROPH/DIAG INJ IV PUSH: CPT

## 2018-09-12 PROCEDURE — 2580000003 HC RX 258: Performed by: ORTHOPAEDIC SURGERY

## 2018-09-12 PROCEDURE — G8987 SELF CARE CURRENT STATUS: HCPCS

## 2018-09-12 PROCEDURE — 97535 SELF CARE MNGMENT TRAINING: CPT

## 2018-09-12 PROCEDURE — 97165 OT EVAL LOW COMPLEX 30 MIN: CPT

## 2018-09-12 PROCEDURE — 97530 THERAPEUTIC ACTIVITIES: CPT

## 2018-09-12 PROCEDURE — 97110 THERAPEUTIC EXERCISES: CPT

## 2018-09-12 PROCEDURE — 36415 COLL VENOUS BLD VENIPUNCTURE: CPT

## 2018-09-12 PROCEDURE — 82947 ASSAY GLUCOSE BLOOD QUANT: CPT

## 2018-09-12 PROCEDURE — G0378 HOSPITAL OBSERVATION PER HR: HCPCS

## 2018-09-12 PROCEDURE — 97161 PT EVAL LOW COMPLEX 20 MIN: CPT

## 2018-09-12 PROCEDURE — 96375 TX/PRO/DX INJ NEW DRUG ADDON: CPT

## 2018-09-12 PROCEDURE — 6360000002 HC RX W HCPCS: Performed by: ORTHOPAEDIC SURGERY

## 2018-09-12 PROCEDURE — 85014 HEMATOCRIT: CPT

## 2018-09-12 RX ORDER — GLIPIZIDE 5 MG/1
5 TABLET, FILM COATED, EXTENDED RELEASE ORAL DAILY
Status: DISCONTINUED | OUTPATIENT
Start: 2018-09-12 | End: 2018-09-12 | Stop reason: HOSPADM

## 2018-09-12 RX ORDER — HYDROCODONE BITARTRATE AND ACETAMINOPHEN 5; 325 MG/1; MG/1
1 TABLET ORAL EVERY 4 HOURS PRN
Qty: 42 TABLET | Refills: 0 | Status: SHIPPED | OUTPATIENT
Start: 2018-09-12 | End: 2018-09-19

## 2018-09-12 RX ORDER — LEVOTHYROXINE AND LIOTHYRONINE 38; 9 UG/1; UG/1
60 TABLET ORAL DAILY
Status: DISCONTINUED | OUTPATIENT
Start: 2018-09-12 | End: 2018-09-12 | Stop reason: HOSPADM

## 2018-09-12 RX ORDER — GLIPIZIDE 5 MG/1
5 TABLET, FILM COATED, EXTENDED RELEASE ORAL DAILY
COMMUNITY
End: 2019-01-21 | Stop reason: SDUPTHER

## 2018-09-12 RX ORDER — FUROSEMIDE 40 MG/1
40 TABLET ORAL DAILY
Status: DISCONTINUED | OUTPATIENT
Start: 2018-09-12 | End: 2018-09-12 | Stop reason: HOSPADM

## 2018-09-12 RX ORDER — FUROSEMIDE 40 MG/1
40 TABLET ORAL DAILY
COMMUNITY
End: 2018-12-04 | Stop reason: DRUGHIGH

## 2018-09-12 RX ADMIN — FUROSEMIDE 40 MG: 40 TABLET ORAL at 09:54

## 2018-09-12 RX ADMIN — SODIUM CHLORIDE: 9 INJECTION, SOLUTION INTRAVENOUS at 04:47

## 2018-09-12 RX ADMIN — GLIPIZIDE 5 MG: 5 TABLET, FILM COATED, EXTENDED RELEASE ORAL at 09:54

## 2018-09-12 RX ADMIN — ONDANSETRON 4 MG: 2 INJECTION INTRAMUSCULAR; INTRAVENOUS at 09:48

## 2018-09-12 RX ADMIN — LEVOTHYROXINE AND LIOTHYRONINE 60 MG: 38; 9 TABLET ORAL at 11:02

## 2018-09-12 RX ADMIN — DILTIAZEM HYDROCHLORIDE 180 MG: 180 CAPSULE, COATED, EXTENDED RELEASE ORAL at 09:54

## 2018-09-12 RX ADMIN — ACETAMINOPHEN 650 MG: 325 TABLET ORAL at 13:14

## 2018-09-12 RX ADMIN — HYDROCODONE BITARTRATE AND ACETAMINOPHEN 1 TABLET: 5; 325 TABLET ORAL at 04:39

## 2018-09-12 RX ADMIN — Medication 10 ML: at 09:57

## 2018-09-12 RX ADMIN — METFORMIN HYDROCHLORIDE 500 MG: 500 TABLET ORAL at 11:02

## 2018-09-12 RX ADMIN — CEFAZOLIN SODIUM 2 G: 2 SOLUTION INTRAVENOUS at 04:39

## 2018-09-12 RX ADMIN — APIXABAN 2.5 MG: 2.5 TABLET, FILM COATED ORAL at 09:55

## 2018-09-12 RX ADMIN — NAPROXEN 500 MG: 500 TABLET ORAL at 09:54

## 2018-09-12 ASSESSMENT — PAIN DESCRIPTION - PAIN TYPE
TYPE: SURGICAL PAIN

## 2018-09-12 ASSESSMENT — PAIN DESCRIPTION - LOCATION
LOCATION: KNEE

## 2018-09-12 ASSESSMENT — PAIN SCALES - GENERAL
PAINLEVEL_OUTOF10: 4
PAINLEVEL_OUTOF10: 4
PAINLEVEL_OUTOF10: 2
PAINLEVEL_OUTOF10: 8
PAINLEVEL_OUTOF10: 2
PAINLEVEL_OUTOF10: 1

## 2018-09-12 ASSESSMENT — PAIN DESCRIPTION - ORIENTATION
ORIENTATION: LEFT

## 2018-09-12 ASSESSMENT — PAIN DESCRIPTION - DESCRIPTORS: DESCRIPTORS: ACHING

## 2018-09-12 NOTE — CONSULTS
Take 180 mg by mouth daily   Yes Historical Provider, MD   metFORMIN (GLUCOPHAGE) 500 MG tablet Take 500 mg by mouth 2 times daily (with meals)   Yes Historical Provider, MD   vitamin D (CHOLECALCIFEROL) 1000 UNIT TABS tablet Take by mouth once a week   Yes Historical Provider, MD   Cyanocobalamin (VITAMIN B 12 PO) Take by mouth daily   Yes Historical Provider, MD   ibuprofen (ADVIL;MOTRIN) 200 MG tablet Take 400 mg by mouth every 6 hours as needed for Pain   Yes Historical Provider, MD       Allergies:  Sulfa antibiotics and Percocet [oxycodone-acetaminophen]    Social History:   TOBACCO:   reports that she has never smoked. She has never used smokeless tobacco.  ETOH:   reports that she does not drink alcohol. Family History:   History reviewed. No pertinent family history. Review of Systems:  Constitutional: negative for chills and fevers  ENT: negative for nasal congestion, sore throat and voice change  Respiratory: negative for shortness of breath or cough  Cardiovascular: negative for chest pain and palpitations  Gastrointestinal: negative for abdominal pain, nausea, vomiting, diarrhea or constipation  Genitourinary:negative for dysuria, urgency or frequency  Musculoskeletal: positive for joint pain  Skin: negative for rashes or skin lesions  Neurological: negative for weakness, numbness or tingling    Objective:    Vitals:   Temp: 97.9 °F (36.6 °C)  BP: 128/72  Resp: 20  Pulse: 105  SpO2: 96 %  24HR INTAKE/OUTPUT:      Intake/Output Summary (Last 24 hours) at 09/12/18 0900  Last data filed at 09/12/18 0439   Gross per 24 hour   Intake             4006 ml   Output              800 ml   Net             3206 ml     -----------------------------------------------------------------  Exam:  GEN:    Awake, alert and oriented x 3. no acute distress  EYES:  EOMI, pupils equal   NECK:  Supple. No lymphadenopathy.   No carotid bruit  CVS:    RRR, no murmur, rub or gallop  PULM:  CTA, no wheezes, rales or rhonchi  ABD:    Obese, Bowels sounds normal.  Abdomen is soft. No distention. No tenderness. EXT:   Trace  edema. No calf tenderness  NEURO: Follows commands, TOUSSAINT, Motor and sensory are intact  SKIN:   Incision is dressed. No surrounding erythema noted. -----------------------------------------------------------------  Diagnostic Data:    · All available data reviewed  Lab Results   Component Value Date    WBC 6.0 08/09/2018    HGB 12.0 09/12/2018    MCV 88.5 08/09/2018     08/09/2018      Lab Results   Component Value Date    GLUCOSE 165 (H) 07/21/2018    BUN 16 07/21/2018    CREATININE 0.65 07/21/2018     07/21/2018    K 4.4 07/21/2018    CALCIUM 9.5 07/21/2018    CL 98 07/21/2018    CO2 25 07/21/2018       Active Problems:    S/P knee surgery    Diabetes mellitus (HCC)    Thyroid disease    H/O sinus tachycardia    H/O radioactive iodine thyroid ablation    NASIR (obstructive sleep apnea)  Resolved Problems:    * No resolved hospital problems. *      Assessment:      · Post-op medical management of HTN, fluid retention, DM, thyroid disease  · Status post left total Knee arthroplasty      Recommendation:     · Agree with post-op orders as written by Dr. Lucien Gunter  · Agree with current post-op DVT prophylaxis orders  · Pain control as ordered per Dr. Lucien Gunter  · Hb stable  · SSI  · Home meds  · Stop IVF  · CPAP  · Recommend ECHO as outpt  · [id number] [address] for discharge from medical standpoint  · High risk medication: none    CORE MEASURES  DVT prophylaxis: eliquis  Decubitus ulcer present on admission: No  CODE STATUS: FULL CODE  Nutrition Status: good   Physical therapy: Yes   Old Charts reviewed: Yes  EKG Reviewed: 8/9/16 - NSR  Advance Directive Addressed:  Yes    Charly Terrazas PA-C  9/12/2018, 9:00 AM

## 2018-09-12 NOTE — PROGRESS NOTES
Department of Orthopedic Surgery  Progress Note    Subjective:  No complaints. Doing well postoperatively. pain is perceived as mild (1-3  pain scale). No current concerns or issues. Vitals  VITALS:  /72   Pulse 105   Temp 97.9 °F (36.6 °C) (Temporal)   Resp 20   Ht 5' 3\" (1.6 m)   Wt 239 lb 4.8 oz (108.5 kg)   SpO2 96%   BMI 42.39 kg/m²     PHYSICAL EXAM:  General: in no apparent distress, well developed and well nourished, alert and oriented times 3  Left Lower Extremity  Incision:  dressing in place, clean, dry and intact. Drain intact with scant drainage. Drain Removed. Neurologic:  Moving lower extremity as appropriate following sugery. Able to dorsiflex and plantar flex foot/ankle. Intact to gross sensation and touch in lower extremity. Vascular: present 2+ lower extremity. Calf soft, non-tender. No evidence of DVT seen on physical exam.  Negative Amada's sign. Abnormal Exam findings:  none    LABS:  Hgb:    Lab Results   Component Value Date    HGB 12.0 09/12/2018       ASSESSMENT AND PLAN:  Post operative day 1 status post left total knee arthroplasty. 1:  Weight bearing as tolerated  2:  Continue Deep venous thrombosis prophylaxis; will start on Eliquis 2.5 mg b.i.d.  For 2 weeks  3:  Continue physical therapy  4:  D/C Plan:  Home  5:  Continue Pain Control  6:  Drain removed

## 2018-09-12 NOTE — PLAN OF CARE
Problem: Mobility - Impaired:  Goal: Mobility will improve  Mobility will improve   Outcome: Ongoing  Patient ambulates with front wheeled walker. Tolerates well. Will continue to monitor. Problem: Infection - Surgical Site:  Goal: Will show no infection signs and symptoms  Will show no infection signs and symptoms   Outcome: Ongoing  Surgical site is clean, dry and intact. No temperature noted. Will continue to monitor.

## 2018-09-12 NOTE — PROGRESS NOTES
Physical Therapy  Facility/Department: [facility] MED SURG  Daily Treatment Note  NAME: Yifan Tenorio  : 1960  MRN: [id number]    Date of Service: 2018    Discharge Recommendations:           Patient Diagnosis(es): The encounter diagnosis was Post-op pain. has a past medical history of Arthritis; Diabetes mellitus ([address] Tachycardia; Thyroid disease; and Water retention. has a past surgical history that includes Thyroidectomy; Cholecystectomy; Appendectomy; Colonoscopy (); and Breast lumpectomy (Left, ). Restrictions  Restrictions/Precautions  Restrictions/Precautions: Weight Bearing, Fall Risk, General Precautions  Lower Extremity Weight Bearing Restrictions  Left Lower Extremity Weight Bearing: Weight Bearing As Tolerated  Subjective   General  Chart Reviewed: Yes  Response To Previous Treatment: Patient with no complaints from previous session.   Family / Caregiver Present: No  Subjective  Subjective: Pt reports pain 4/10 in post L knee          Orientation  Orientation  Overall Orientation Status: Within Functional Limits  Objective   Bed mobility  Supine to Sit: Stand by assistance  Sit to Supine: Stand by assistance  Scooting: Stand by assistance  Transfers  Stand to sit: Stand by assistance  Ambulation  Ambulation?: Yes  More Ambulation?: No  Ambulation 1  Surface: level tile  Device: Rolling Walker  Assistance: Contact guard assistance;Stand by assistance  Quality of Gait: slow steady alfred with fair heel/toe gait  Distance: 50 ft x1  Stairs/Curb  Stairs?: Yes  Stairs  Stairs Height: 6\"  Rails: Bilateral  Device: No Device  Assistance: Stand by assistance;Contact guard assistance     Balance  Standing - Static: Fair;+  Standing - Dynamic: Fair  Exercises  Straight Leg Raise: x10  Quad Sets: x10  Heelslides: x10  Hip Abduction: x10  Knee Long Arc Quad: x10  Knee Short Arc Quad: x10  Knee Active Range of Motion: x10  Ankle Pumps: 10x2  Comments: L LE ther ex completed supine and

## 2018-10-31 ENCOUNTER — OFFICE VISIT (OUTPATIENT)
Dept: PRIMARY CARE CLINIC | Age: 58
End: 2018-10-31
Payer: COMMERCIAL

## 2018-10-31 VITALS
OXYGEN SATURATION: 97 % | BODY MASS INDEX: 42.51 KG/M2 | DIASTOLIC BLOOD PRESSURE: 76 MMHG | TEMPERATURE: 97.7 F | SYSTOLIC BLOOD PRESSURE: 119 MMHG | WEIGHT: 240 LBS | HEART RATE: 114 BPM

## 2018-10-31 DIAGNOSIS — L23.7 ALLERGIC CONTACT DERMATITIS DUE TO PLANT: Primary | ICD-10-CM

## 2018-10-31 PROCEDURE — 3017F COLORECTAL CA SCREEN DOC REV: CPT | Performed by: NURSE PRACTITIONER

## 2018-10-31 PROCEDURE — G8417 CALC BMI ABV UP PARAM F/U: HCPCS | Performed by: NURSE PRACTITIONER

## 2018-10-31 PROCEDURE — G8484 FLU IMMUNIZE NO ADMIN: HCPCS | Performed by: NURSE PRACTITIONER

## 2018-10-31 PROCEDURE — G8427 DOCREV CUR MEDS BY ELIG CLIN: HCPCS | Performed by: NURSE PRACTITIONER

## 2018-10-31 PROCEDURE — 1036F TOBACCO NON-USER: CPT | Performed by: NURSE PRACTITIONER

## 2018-10-31 PROCEDURE — 99202 OFFICE O/P NEW SF 15 MIN: CPT | Performed by: NURSE PRACTITIONER

## 2018-10-31 RX ORDER — METHYLPREDNISOLONE 4 MG/1
TABLET ORAL
Qty: 1 KIT | Refills: 0 | Status: SHIPPED | OUTPATIENT
Start: 2018-10-31 | End: 2018-11-06

## 2018-10-31 RX ORDER — THYROID, PORCINE 240 MG/1
TABLET ORAL
COMMUNITY
Start: 2018-10-08 | End: 2018-12-11 | Stop reason: DRUGHIGH

## 2018-10-31 ASSESSMENT — ENCOUNTER SYMPTOMS
VOMITING: 0
DIARRHEA: 0
COLOR CHANGE: 0
COUGH: 0
RHINORRHEA: 0
SHORTNESS OF BREATH: 0
WHEEZING: 0

## 2018-10-31 NOTE — PROGRESS NOTES
40 mg by mouth daily      diltiazem (CARTIA XT) 180 MG extended release capsule Take 180 mg by mouth daily      metFORMIN (GLUCOPHAGE) 500 MG tablet Take 500 mg by mouth 2 times daily (with meals)      vitamin D (CHOLECALCIFEROL) 1000 UNIT TABS tablet Take by mouth once a week      Cyanocobalamin (VITAMIN B 12 PO) Take by mouth daily      ibuprofen (ADVIL;MOTRIN) 200 MG tablet Take 400 mg by mouth every 6 hours as needed for Pain       No current facility-administered medications for this visit. Allergies   Allergen Reactions    Sulfa Antibiotics Swelling     Throat swells     Percocet [Oxycodone-Acetaminophen] Nausea And Vomiting       Subjective:     Review of Systems   Constitutional: Negative for appetite change, chills, diaphoresis, fatigue and fever. HENT: Negative for congestion and rhinorrhea. Respiratory: Negative for cough, shortness of breath and wheezing. Gastrointestinal: Negative for diarrhea and vomiting. Skin: Positive for rash. Negative for color change, pallor and wound. Neurological: Negative for dizziness, light-headedness and headaches. Objective:     Physical Exam   Constitutional: She is oriented to person, place, and time. She appears well-developed and well-nourished. She is cooperative. She does not appear ill. No distress. HENT:   Head: Normocephalic and atraumatic. Right Ear: Hearing and external ear normal.   Left Ear: Hearing and external ear normal.   Nose: Nose normal.   Eyes: Conjunctivae are normal.   Cardiovascular: Regular rhythm, S1 normal, S2 normal, normal heart sounds and intact distal pulses. Tachycardia present. Exam reveals no gallop and no friction rub. No murmur heard. Pulmonary/Chest: Effort normal and breath sounds normal. No accessory muscle usage. No respiratory distress. She has no decreased breath sounds. She has no wheezes. She has no rhonchi. She has no rales. She exhibits no tenderness. No cough.   Breath sounds clear B/L anterior and posterior lobes. No respiratory distress and/or audible wheezing. No rales or rhonchi. Musculoskeletal: Normal range of motion. Lymphadenopathy:     She has no cervical adenopathy. Right cervical: No superficial cervical and no posterior cervical adenopathy present. Left cervical: No superficial cervical and no posterior cervical adenopathy present. She has no axillary adenopathy. Right axillary: No pectoral and no lateral adenopathy present. Left axillary: No pectoral and no lateral adenopathy present. Neurological: She is alert and oriented to person, place, and time. Skin: Skin is warm and dry. Rash noted. No abrasion, no bruising, no ecchymosis, no lesion, no petechiae and no purpura noted. Rash is maculopapular, vesicular and urticarial. Rash is not macular, not nodular and not pustular. She is not diaphoretic. No erythema. No pallor. Scattered erythematous, urticarial, papulovesicular to maculopapular rash to right arm, right neck up to right cheek, groin and B/L lower legs. No drainage, bleeding, seeping, crusting or red streaking. No focal warmth, edema or tenderness. Psychiatric: She has a normal mood and affect. Her behavior is normal.   Nursing note and vitals reviewed. /76   Pulse 114   Temp 97.7 °F (36.5 °C)   Wt 240 lb (108.9 kg)   SpO2 97%   BMI 42.51 kg/m²     Assessment:      Diagnosis Orders   1. Allergic contact dermatitis due to plant  methylPREDNISolone (MEDROL DOSEPACK) 4 MG tablet       Plan:      Return if symptoms worsen or fail to improve, for Resume all previous medications as directed. Orders Placed This Encounter   Medications    methylPREDNISolone (MEDROL DOSEPACK) 4 MG tablet     Sig: Take by mouth. Dispense:  1 kit     Refill:  0      · If avoidance is not possible, apply a barrier lotion such as [employer], Zinc Oxide paste or Desenex prior to potential exposure.   · Exposed clothing, shoes,

## 2018-10-31 NOTE — PATIENT INSTRUCTIONS
sure to make and go to all appointments, and call your doctor if you are having problems. It's also a good idea to know your test results and keep a list of the medicines you take. How can you care for yourself at home? · If your doctor prescribed a cream, use it as directed. If your doctor prescribed medicine, take it exactly as prescribed. Call your doctor if you think you are having a problem with your medicine. · Use cold, wet cloths to reduce itching. · Keep cool, and stay out of the sun. · Leave the rash open to the air. · Wash all clothing or other things that may have come in contact with the plant oil. · Avoid most lotions and ointments until the rash heals. Calamine lotion may help relieve symptoms of a plant rash. Use it 3 or 4 times a day. To prevent poison ivy exposure  If you know that you will be near poison ivy, oak, or sumac, you can try these options:  · Use a product designed to help prevent plant oil from getting on the skin. These products, such as Ivy X Pre-Contact Skin Solution, come in lotions, sprays, or towelettes. You put the product on your skin right before you go outdoors. · If you did not use a preventive product and you have had contact with plant oil, clean it off your skin as soon as possible. Use a product such as [employer] Outdoor Skin Cleanser. These products can also be used to clean plant oil from clothing or tools. When should you call for help? Call your doctor now or seek immediate medical care if:    · Your rash gets worse, and you start to feel bad and have a fever, a stiff neck, nausea, and vomiting.     · You have signs of infection, such as:  ¨ Increased pain, swelling, warmth, or redness. ¨ Red streaks leading from the rash. ¨ Pus draining from the rash.   ¨ A fever.    Watch closely for changes in your health, and be sure to contact your doctor if:    · You have new blisters or bruises, or the rash spreads and looks like a sunburn.     · The rash gets

## 2018-12-04 ENCOUNTER — OFFICE VISIT (OUTPATIENT)
Dept: FAMILY MEDICINE CLINIC | Age: 58
End: 2018-12-04
Payer: COMMERCIAL

## 2018-12-04 VITALS
HEART RATE: 110 BPM | SYSTOLIC BLOOD PRESSURE: 122 MMHG | WEIGHT: 234 LBS | DIASTOLIC BLOOD PRESSURE: 70 MMHG | BODY MASS INDEX: 41.45 KG/M2 | OXYGEN SATURATION: 98 %

## 2018-12-04 DIAGNOSIS — E55.9 VITAMIN D DEFICIENCY: ICD-10-CM

## 2018-12-04 DIAGNOSIS — E11.9 TYPE 2 DIABETES MELLITUS WITHOUT COMPLICATION, WITHOUT LONG-TERM CURRENT USE OF INSULIN (HCC): ICD-10-CM

## 2018-12-04 DIAGNOSIS — Z98.890 S/P KNEE SURGERY: ICD-10-CM

## 2018-12-04 DIAGNOSIS — J06.9 VIRAL URI: Primary | ICD-10-CM

## 2018-12-04 DIAGNOSIS — G47.33 OSA (OBSTRUCTIVE SLEEP APNEA): ICD-10-CM

## 2018-12-04 DIAGNOSIS — Z13.220 SCREENING FOR HYPERLIPIDEMIA: ICD-10-CM

## 2018-12-04 DIAGNOSIS — E07.9 THYROID DISEASE: ICD-10-CM

## 2018-12-04 LAB — HBA1C MFR BLD: 6.5 %

## 2018-12-04 PROCEDURE — 83036 HEMOGLOBIN GLYCOSYLATED A1C: CPT | Performed by: NURSE PRACTITIONER

## 2018-12-04 PROCEDURE — 99203 OFFICE O/P NEW LOW 30 MIN: CPT | Performed by: NURSE PRACTITIONER

## 2018-12-04 RX ORDER — FUROSEMIDE 40 MG/1
40 TABLET ORAL
COMMUNITY
End: 2019-09-04 | Stop reason: SDUPTHER

## 2018-12-04 RX ORDER — GLUCOSAMINE HCL/CHONDROITIN SU 500-400 MG
CAPSULE ORAL
Qty: 100 STRIP | Refills: 1 | Status: SHIPPED | OUTPATIENT
Start: 2018-12-04

## 2018-12-04 RX ORDER — MELOXICAM 7.5 MG/1
7.5 TABLET ORAL DAILY
Qty: 30 TABLET | Refills: 2 | Status: SHIPPED | OUTPATIENT
Start: 2018-12-04 | End: 2019-01-29 | Stop reason: SDUPTHER

## 2018-12-04 RX ORDER — LANCETS 30 GAUGE
EACH MISCELLANEOUS
Qty: 100 EACH | Refills: 3 | Status: SHIPPED | OUTPATIENT
Start: 2018-12-04

## 2018-12-04 ASSESSMENT — PATIENT HEALTH QUESTIONNAIRE - PHQ9
SUM OF ALL RESPONSES TO PHQ9 QUESTIONS 1 & 2: 0
SUM OF ALL RESPONSES TO PHQ QUESTIONS 1-9: 0
1. LITTLE INTEREST OR PLEASURE IN DOING THINGS: 0
SUM OF ALL RESPONSES TO PHQ QUESTIONS 1-9: 0
2. FEELING DOWN, DEPRESSED OR HOPELESS: 0

## 2018-12-04 ASSESSMENT — ENCOUNTER SYMPTOMS
COUGH: 0
BACK PAIN: 0
NAUSEA: 0
DIARRHEA: 0
VOMITING: 0
SHORTNESS OF BREATH: 0
CONSTIPATION: 0
BLOOD IN STOOL: 0
ABDOMINAL PAIN: 0
SORE THROAT: 0

## 2018-12-04 NOTE — PROGRESS NOTES
normal reflexes. GCS eye subscore is 4. GCS verbal subscore is 5. GCS motor subscore is 6. Skin: Skin is warm, dry and intact. No rash noted. Psychiatric: She has a normal mood and affect. Her speech is normal and behavior is normal. Judgment and thought content normal. Cognition and memory are normal.   Nursing note and vitals reviewed. Data:     Lab Results   Component Value Date     07/21/2018    K 4.4 07/21/2018    CL 98 07/21/2018    CO2 25 07/21/2018    BUN 16 07/21/2018    CREATININE 0.65 07/21/2018    GLUCOSE 165 07/21/2018    PROT 7.7 07/21/2018    LABALBU 4.1 07/21/2018    BILITOT 0.40 07/21/2018    ALKPHOS 106 07/21/2018    AST 12 07/21/2018    ALT 16 07/21/2018     Lab Results   Component Value Date    WBC 6.0 08/09/2018    RBC 4.68 08/09/2018    HGB 12.0 09/12/2018    HCT 36.4 09/12/2018    MCV 88.5 08/09/2018    MCH 29.1 08/09/2018    MCHC 32.9 08/09/2018    RDW 13.2 08/09/2018     08/09/2018    MPV 10.4 08/09/2018     Lab Results   Component Value Date    TSH 0.02 07/21/2018     Lab Results   Component Value Date    LABA1C 6.5 12/04/2018          Assessment & Plan        Diagnosis Orders   1. Viral URI   --Patient has been having symptoms of a viral URI. No need for antibiotics. Increase rest and water intake  May use warm tea and honey for sore throat  May gargle salt water for sore throat  May use saline nose spray for nasal congestion        2. Type 2 diabetes mellitus without complication, without long-term current use of insulin (HCC)  --A1c today = 6.5%. Continue same medications and diet modification. Pt does not have a glucometer at home. Will send. CBC Auto Differential    Comprehensive Metabolic Panel    POCT glycosylated hemoglobin (Hb A1C)    blood glucose monitor kit and supplies    blood glucose monitor strips    [facility]   3. S/P knee surgery   --pt had left knee replacement 2 months ago (September 2018).  Needs right knee replaced but pt is

## 2018-12-09 ENCOUNTER — HOSPITAL ENCOUNTER (OUTPATIENT)
Age: 58
Discharge: HOME | End: 2018-12-09
Payer: COMMERCIAL

## 2018-12-09 DIAGNOSIS — Z13.220 SCREENING FOR HYPERLIPIDEMIA: ICD-10-CM

## 2018-12-09 DIAGNOSIS — E55.9 VITAMIN D DEFICIENCY: ICD-10-CM

## 2018-12-09 DIAGNOSIS — E11.9 TYPE 2 DIABETES MELLITUS WITHOUT COMPLICATION, WITHOUT LONG-TERM CURRENT USE OF INSULIN (HCC): ICD-10-CM

## 2018-12-09 DIAGNOSIS — E07.9 THYROID DISEASE: ICD-10-CM

## 2018-12-09 LAB
ABSOLUTE EOS #: 0.2 K/UL (ref 0–0.4)
ABSOLUTE IMMATURE GRANULOCYTE: ABNORMAL K/UL (ref 0–0.3)
ABSOLUTE LYMPH #: 1.8 K/UL (ref 1–4.8)
ABSOLUTE MONO #: 0.4 K/UL (ref 0–1)
ALBUMIN SERPL-MCNC: 3.9 G/DL (ref 3.5–5.2)
ALBUMIN/GLOBULIN RATIO: ABNORMAL (ref 1–2.5)
ALP BLD-CCNC: 94 U/L (ref 35–104)
ALT SERPL-CCNC: 25 U/L (ref 5–33)
ANION GAP SERPL CALCULATED.3IONS-SCNC: 13 MMOL/L (ref 8–16)
AST SERPL-CCNC: 21 U/L
BASOPHILS # BLD: 1 % (ref 0–2)
BASOPHILS ABSOLUTE: 0 K/UL (ref 0–0.2)
BILIRUB SERPL-MCNC: 0.35 MG/DL (ref 0.3–1.2)
BUN BLDV-MCNC: 17 MG/DL (ref 6–20)
BUN/CREAT BLD: 27 (ref 9–20)
CALCIUM SERPL-MCNC: 9.3 MG/DL (ref 8.6–10.4)
CHLORIDE BLD-SCNC: 103 MMOL/L (ref 98–107)
CHOLESTEROL/HDL RATIO: 3.8
CHOLESTEROL: 152 MG/DL
CO2: 23 MMOL/L (ref 20–31)
CREAT SERPL-MCNC: 0.64 MG/DL (ref 0.5–0.9)
DIFFERENTIAL TYPE: YES
EOSINOPHILS RELATIVE PERCENT: 4 % (ref 0–5)
FOLATE: 12.9 NG/ML
GFR AFRICAN AMERICAN: >60 ML/MIN
GFR NON-AFRICAN AMERICAN: >60 ML/MIN
GFR SERPL CREATININE-BSD FRML MDRD: ABNORMAL ML/MIN/{1.73_M2}
GFR SERPL CREATININE-BSD FRML MDRD: ABNORMAL ML/MIN/{1.73_M2}
GLUCOSE BLD-MCNC: 220 MG/DL (ref 70–99)
HCT VFR BLD CALC: 36.8 % (ref 36–46)
HDLC SERPL-MCNC: 40 MG/DL
HEMOGLOBIN: 12.2 G/DL (ref 12–16)
IMMATURE GRANULOCYTES: ABNORMAL %
LDL CHOLESTEROL: 87 MG/DL (ref 0–130)
LYMPHOCYTES # BLD: 36 % (ref 15–40)
MCH RBC QN AUTO: 27 PG (ref 26–34)
MCHC RBC AUTO-ENTMCNC: 33.2 G/DL (ref 31–37)
MCV RBC AUTO: 81.3 FL (ref 80–100)
MONOCYTES # BLD: 9 % (ref 4–8)
NRBC AUTOMATED: ABNORMAL PER 100 WBC
PATIENT FASTING?: YES
PDW BLD-RTO: 14.8 % (ref 12.1–15.2)
PLATELET # BLD: 257 K/UL (ref 140–450)
PLATELET ESTIMATE: ABNORMAL
PMV BLD AUTO: ABNORMAL FL (ref 6–12)
POTASSIUM SERPL-SCNC: 3.4 MMOL/L (ref 3.7–5.3)
RBC # BLD: 4.53 M/UL (ref 4–5.2)
RBC # BLD: ABNORMAL 10*6/UL
SEG NEUTROPHILS: 50 % (ref 47–75)
SEGMENTED NEUTROPHILS ABSOLUTE COUNT: 2.6 K/UL (ref 2.5–7)
SODIUM BLD-SCNC: 139 MMOL/L (ref 135–144)
T3 FREE: 11.98 PG/ML (ref 2.02–4.43)
THYROXINE, FREE: 1.68 NG/DL (ref 0.93–1.7)
TOTAL PROTEIN: 7.6 G/DL (ref 6.4–8.3)
TRIGL SERPL-MCNC: 126 MG/DL
TSH SERPL DL<=0.05 MIU/L-ACNC: <0.01 MIU/L (ref 0.3–5)
VITAMIN B-12: 458 PG/ML (ref 232–1245)
VITAMIN D 25-HYDROXY: 43.1 NG/ML (ref 30–100)
VLDLC SERPL CALC-MCNC: ABNORMAL MG/DL (ref 1–30)
WBC # BLD: 5.1 K/UL (ref 3.5–11)
WBC # BLD: ABNORMAL 10*3/UL

## 2018-12-09 PROCEDURE — 82607 VITAMIN B-12: CPT

## 2018-12-09 PROCEDURE — 82306 VITAMIN D 25 HYDROXY: CPT

## 2018-12-09 PROCEDURE — 80061 LIPID PANEL: CPT

## 2018-12-09 PROCEDURE — 82746 ASSAY OF FOLIC ACID SERUM: CPT

## 2018-12-09 PROCEDURE — 36415 COLL VENOUS BLD VENIPUNCTURE: CPT

## 2018-12-09 PROCEDURE — 84439 ASSAY OF FREE THYROXINE: CPT

## 2018-12-09 PROCEDURE — 84443 ASSAY THYROID STIM HORMONE: CPT

## 2018-12-09 PROCEDURE — 80053 COMPREHEN METABOLIC PANEL: CPT

## 2018-12-09 PROCEDURE — 85025 COMPLETE CBC W/AUTO DIFF WBC: CPT

## 2018-12-09 PROCEDURE — 84481 FREE ASSAY (FT-3): CPT

## 2018-12-11 ENCOUNTER — HOSPITAL ENCOUNTER (OUTPATIENT)
Age: 58
Setting detail: SPECIMEN
Discharge: HOME | End: 2018-12-11
Payer: COMMERCIAL

## 2018-12-11 ENCOUNTER — OFFICE VISIT (OUTPATIENT)
Dept: FAMILY MEDICINE CLINIC | Age: 58
End: 2018-12-11
Payer: COMMERCIAL

## 2018-12-11 VITALS
HEART RATE: 104 BPM | OXYGEN SATURATION: 98 % | DIASTOLIC BLOOD PRESSURE: 62 MMHG | SYSTOLIC BLOOD PRESSURE: 108 MMHG | WEIGHT: 230 LBS | BODY MASS INDEX: 40.74 KG/M2 | TEMPERATURE: 97.6 F

## 2018-12-11 DIAGNOSIS — E07.9 THYROID DISEASE: ICD-10-CM

## 2018-12-11 DIAGNOSIS — Z01.419 GYNECOLOGIC EXAM NORMAL: Primary | ICD-10-CM

## 2018-12-11 DIAGNOSIS — Z01.419 GYNECOLOGIC EXAM NORMAL: ICD-10-CM

## 2018-12-11 PROCEDURE — 99396 PREV VISIT EST AGE 40-64: CPT | Performed by: NURSE PRACTITIONER

## 2018-12-11 PROCEDURE — G0145 SCR C/V CYTO,THINLAYER,RESCR: HCPCS

## 2018-12-11 RX ORDER — LEVOTHYROXINE AND LIOTHYRONINE 76; 18 UG/1; UG/1
120 TABLET ORAL DAILY
Qty: 30 TABLET | Refills: 1 | Status: SHIPPED | OUTPATIENT
Start: 2018-12-11 | End: 2019-02-07 | Stop reason: SDUPTHER

## 2018-12-11 ASSESSMENT — ENCOUNTER SYMPTOMS
VOMITING: 0
SHORTNESS OF BREATH: 0
NAUSEA: 0
DIARRHEA: 0
COUGH: 0

## 2018-12-11 NOTE — PROGRESS NOTES
HPI Notes    Name: Ziggy Corey  : 1960         Chief Complaint:     Chief Complaint   Patient presents with    Gynecologic Exam    Discuss Labs     Patient here to discuss recent labs    Hypothyroidism       History of Present Illness:        HPI  Pt is a 61yo female who reports for a routine gynecological exam. Pt denies any vaginal discharge or bleeding. Denies pain with sex. Pt had mammogram 2018, which was normal.     Pt also wants to talk about recent labs including thyroid function. Past Medical History:     Past Medical History:   Diagnosis Date    Arthritis     Diabetes mellitus ([address]     Graves disease     s/p radioactive iodine due to tachycardia ablation followed by hypothyroidism    Hypothyroidism     Tachycardia     Water retention       Reviewed all health maintenance requirements and ordered appropriate tests  Health Maintenance Due   Topic Date Due    Hepatitis C screen  1960    Diabetic foot exam  09/10/1970    Diabetic retinal exam  09/10/1970    HIV screen  09/10/1975    Diabetic microalbuminuria test  09/10/1978    DTaP/Tdap/Td vaccine (1 - Tdap) 09/10/1979    Pneumococcal med risk (1 of 1 - PPSV23) 09/10/1979    Cervical cancer screen  09/10/1981    Shingles Vaccine (1 of 2 - 2 Dose Series) 09/10/2010    Colon cancer screen colonoscopy  09/10/2010    Flu vaccine (1) 2018       Past Surgical History:     Past Surgical History:   Procedure Laterality Date    APPENDECTOMY      BREAST LUMPECTOMY Left     CHOLECYSTECTOMY      COLONOSCOPY      OTHER SURGICAL HISTORY      radioactive iodine thyroid ablation due to Grave's disease    [place] TOTAL KNEE ARTHROPLASTY Left 2018    KNEE TOTAL ARTHROPLASTY performed by Shannon Barber MD at [address] [facility] Left 2018    Dr. Rodolfo De La Cruz        Medications:       Prior to Admission medications    Medication Sig Start Date End Date Taking?  Authorizing Provider   thyroid please call the office to discuss how we could have made your experience a very good one. Thank you. Electronically signed by GORDO Kern CNP on 12/11/2018 at 8:27 AM           Completed Refills   Requested Prescriptions     Signed Prescriptions Disp Refills    thyroid (ARMOUR THYROID) 120 MG tablet 30 tablet 1     Sig: Take 1 tablet by mouth daily         Isobel Simmonds received counseling on the following healthy behaviors: nutrition, exercise and medication adherence  Reviewed prior labs and health maintenance. Continue current medications, diet and exercise. Discussed use, benefit, and side effects of prescribed medications. Barriers to medication compliance addressed. Patient given educational materials - see patient instructions. All patient questions answered. Patient voiced understanding.

## 2018-12-27 LAB — CYTOLOGY REPORT: NORMAL

## 2019-01-02 ENCOUNTER — TELEPHONE (OUTPATIENT)
Dept: FAMILY MEDICINE CLINIC | Age: 59
End: 2019-01-02

## 2019-01-02 RX ORDER — DOXYCYCLINE HYCLATE 100 MG
100 TABLET ORAL 2 TIMES DAILY
Qty: 20 TABLET | Refills: 0 | Status: SHIPPED | OUTPATIENT
Start: 2019-01-02 | End: 2019-01-12

## 2019-01-21 RX ORDER — GLIPIZIDE 5 MG/1
5 TABLET, FILM COATED, EXTENDED RELEASE ORAL DAILY
Qty: 30 TABLET | Refills: 5 | Status: SHIPPED | OUTPATIENT
Start: 2019-01-21 | End: 2019-01-29 | Stop reason: SDUPTHER

## 2019-01-29 RX ORDER — MELOXICAM 7.5 MG/1
7.5 TABLET ORAL DAILY
Qty: 90 TABLET | Refills: 1 | Status: SHIPPED | OUTPATIENT
Start: 2019-01-29 | End: 2019-02-05 | Stop reason: SDUPTHER

## 2019-01-29 RX ORDER — GLIPIZIDE 5 MG/1
5 TABLET, FILM COATED, EXTENDED RELEASE ORAL DAILY
Qty: 30 TABLET | Refills: 5 | Status: SHIPPED | OUTPATIENT
Start: 2019-01-29 | End: 2019-01-29 | Stop reason: SDUPTHER

## 2019-01-29 RX ORDER — GLIPIZIDE 5 MG/1
5 TABLET, FILM COATED, EXTENDED RELEASE ORAL DAILY
Qty: 90 TABLET | Refills: 1 | Status: SHIPPED | OUTPATIENT
Start: 2019-01-29 | End: 2019-02-05 | Stop reason: SDUPTHER

## 2019-02-05 ENCOUNTER — TELEPHONE (OUTPATIENT)
Dept: FAMILY MEDICINE CLINIC | Age: 59
End: 2019-02-05

## 2019-02-05 RX ORDER — MELOXICAM 7.5 MG/1
7.5 TABLET ORAL DAILY
Qty: 90 TABLET | Refills: 1 | Status: SHIPPED | OUTPATIENT
Start: 2019-02-05 | End: 2019-07-17 | Stop reason: SDUPTHER

## 2019-02-05 RX ORDER — GLIPIZIDE 5 MG/1
5 TABLET, FILM COATED, EXTENDED RELEASE ORAL DAILY
Qty: 90 TABLET | Refills: 1 | Status: SHIPPED | OUTPATIENT
Start: 2019-02-05 | End: 2019-05-21 | Stop reason: ALTCHOICE

## 2019-02-06 ENCOUNTER — HOSPITAL ENCOUNTER (OUTPATIENT)
Age: 59
Discharge: HOME | End: 2019-02-06
Payer: COMMERCIAL

## 2019-02-06 DIAGNOSIS — E07.9 THYROID DISEASE: ICD-10-CM

## 2019-02-06 LAB
T3 FREE: 3.16 PG/ML (ref 2.02–4.43)
THYROXINE, FREE: 0.83 NG/DL (ref 0.93–1.7)
TSH SERPL DL<=0.05 MIU/L-ACNC: 0.6 MIU/L (ref 0.3–5)

## 2019-02-06 PROCEDURE — 84481 FREE ASSAY (FT-3): CPT

## 2019-02-06 PROCEDURE — 36415 COLL VENOUS BLD VENIPUNCTURE: CPT

## 2019-02-06 PROCEDURE — 84439 ASSAY OF FREE THYROXINE: CPT

## 2019-02-06 PROCEDURE — 84443 ASSAY THYROID STIM HORMONE: CPT

## 2019-02-07 ENCOUNTER — TELEPHONE (OUTPATIENT)
Dept: FAMILY MEDICINE CLINIC | Age: 59
End: 2019-02-07

## 2019-02-07 DIAGNOSIS — E07.9 THYROID DISEASE: ICD-10-CM

## 2019-02-07 RX ORDER — LEVOTHYROXINE AND LIOTHYRONINE 76; 18 UG/1; UG/1
120 TABLET ORAL DAILY
Qty: 90 TABLET | Refills: 1 | Status: SHIPPED | OUTPATIENT
Start: 2019-02-07 | End: 2019-05-21 | Stop reason: ALTCHOICE

## 2019-02-11 ENCOUNTER — TELEPHONE (OUTPATIENT)
Dept: FAMILY MEDICINE CLINIC | Age: 59
End: 2019-02-11

## 2019-02-11 DIAGNOSIS — E03.8 OTHER SPECIFIED HYPOTHYROIDISM: Primary | ICD-10-CM

## 2019-02-11 RX ORDER — LEVOTHYROXINE SODIUM 175 UG/1
175 TABLET ORAL DAILY
Qty: 30 TABLET | Refills: 2 | Status: SHIPPED | OUTPATIENT
Start: 2019-02-11 | End: 2019-02-11 | Stop reason: SDUPTHER

## 2019-02-11 RX ORDER — LEVOTHYROXINE SODIUM 175 UG/1
175 TABLET ORAL DAILY
Qty: 90 TABLET | Refills: 0 | Status: SHIPPED | OUTPATIENT
Start: 2019-02-11 | End: 2019-04-25 | Stop reason: SDUPTHER

## 2019-05-02 LAB — TSH SERPL DL<=0.05 MIU/L-ACNC: 6.8 UIU/ML

## 2019-05-07 ENCOUNTER — TELEPHONE (OUTPATIENT)
Dept: FAMILY MEDICINE CLINIC | Age: 59
End: 2019-05-07

## 2019-05-07 NOTE — TELEPHONE ENCOUNTER
Roshan Pinzon had her thyroid done at the lab [facility]. Her TSH was 6.80. She would like to know if she needs to be concerned. Please let Roshan Pinzon know.     Health Maintenance   Topic Date Due    Hepatitis C screen  1960    Pneumococcal 0-64 years Vaccine (1 of 1 - PPSV23) 09/10/1966    Diabetic foot exam  09/10/1970    Diabetic retinal exam  09/10/1970    HIV screen  09/10/1975    Diabetic microalbuminuria test  09/10/1978    Hepatitis B Vaccine (1 of 3 - Risk 3-dose series) 09/10/1979    DTaP/Tdap/Td vaccine (1 - Tdap) 09/10/1979    Shingles Vaccine (1 of 2) 09/10/2010    Colon cancer screen colonoscopy  09/10/2010    Flu vaccine (Season Ended) 09/01/2019    A1C test (Diabetic or Prediabetic)  12/04/2019    Lipid screen  12/09/2019    Potassium monitoring  12/09/2019    Creatinine monitoring  12/09/2019    Breast cancer screen  07/25/2020    Cervical cancer screen  12/11/2021             (applicable per patient's age: Cancer Screenings, Depression Screening, Fall Risk Screening, Immunizations)    Hemoglobin A1C (%)   Date Value   12/04/2018 6.5   07/21/2018 7.6 (H)     LDL Cholesterol (mg/dL)   Date Value   12/09/2018 87     AST (U/L)   Date Value   12/09/2018 21     ALT (U/L)   Date Value   12/09/2018 25     BUN (mg/dL)   Date Value   12/09/2018 17      (goal A1C is < 7)   (goal LDL is <100) need 30-50% reduction from baseline     BP Readings from Last 3 Encounters:   12/11/18 108/62   12/04/18 122/70   10/31/18 119/76    (goal /80)      All Future Testing planned in [facility]:  Lab Frequency Next Occurrence   TSH With Reflex Ft4 Once 05/30/2019       Next Visit Date:  Future Appointments   Date Time Provider Kimberley Cohen   5/21/2019  6:00 AM Julissa Bertram, APRN - CNP [facility]            Patient Active Problem List:     S/P knee surgery     Type 2 diabetes mellitus without complication, without long-term current use of insulin ([address]     Thyroid disease     H/O sinus tachycardia     H/O radioactive iodine thyroid ablation     NASIR (obstructive sleep apnea)

## 2019-05-08 NOTE — TELEPHONE ENCOUNTER
The TSH is elevated, but we need to see the T4. There is an order in for end of May already. She can get it done early, this week or next, and then we can make a decision.

## 2019-05-21 ENCOUNTER — OFFICE VISIT (OUTPATIENT)
Dept: FAMILY MEDICINE CLINIC | Age: 59
End: 2019-05-21
Payer: COMMERCIAL

## 2019-05-21 VITALS
OXYGEN SATURATION: 98 % | WEIGHT: 251 LBS | SYSTOLIC BLOOD PRESSURE: 114 MMHG | HEART RATE: 94 BPM | TEMPERATURE: 98.2 F | DIASTOLIC BLOOD PRESSURE: 70 MMHG | BODY MASS INDEX: 44.46 KG/M2

## 2019-05-21 DIAGNOSIS — E11.9 TYPE 2 DIABETES MELLITUS WITHOUT COMPLICATION, WITHOUT LONG-TERM CURRENT USE OF INSULIN (HCC): Primary | ICD-10-CM

## 2019-05-21 DIAGNOSIS — E07.9 THYROID DISEASE: ICD-10-CM

## 2019-05-21 DIAGNOSIS — E03.8 OTHER SPECIFIED HYPOTHYROIDISM: ICD-10-CM

## 2019-05-21 LAB
CREATININE URINE POCT: NORMAL
HBA1C MFR BLD: 6 %
MICROALBUMIN/CREAT 24H UR: NORMAL MG/G{CREAT}
MICROALBUMIN/CREAT UR-RTO: NORMAL

## 2019-05-21 PROCEDURE — 82044 UR ALBUMIN SEMIQUANTITATIVE: CPT | Performed by: NURSE PRACTITIONER

## 2019-05-21 PROCEDURE — 83036 HEMOGLOBIN GLYCOSYLATED A1C: CPT | Performed by: NURSE PRACTITIONER

## 2019-05-21 PROCEDURE — 99214 OFFICE O/P EST MOD 30 MIN: CPT | Performed by: NURSE PRACTITIONER

## 2019-05-21 RX ORDER — LEVOTHYROXINE SODIUM 0.2 MG/1
200 TABLET ORAL DAILY
Qty: 90 TABLET | Refills: 1 | Status: SHIPPED | OUTPATIENT
Start: 2019-05-21 | End: 2019-10-30 | Stop reason: SDUPTHER

## 2019-05-21 ASSESSMENT — ENCOUNTER SYMPTOMS
VOMITING: 0
NAUSEA: 1
COUGH: 0
DIARRHEA: 1
BLOOD IN STOOL: 0
ABDOMINAL PAIN: 1
SHORTNESS OF BREATH: 0

## 2019-05-21 ASSESSMENT — PATIENT HEALTH QUESTIONNAIRE - PHQ9
1. LITTLE INTEREST OR PLEASURE IN DOING THINGS: 0
SUM OF ALL RESPONSES TO PHQ9 QUESTIONS 1 & 2: 0
SUM OF ALL RESPONSES TO PHQ QUESTIONS 1-9: 0
2. FEELING DOWN, DEPRESSED OR HOPELESS: 0
SUM OF ALL RESPONSES TO PHQ QUESTIONS 1-9: 0

## 2019-05-21 NOTE — PROGRESS NOTES
HPI Notes    Name: Eugenio Quinonez  : 1960         Chief Complaint:     Chief Complaint   Patient presents with    Thyroid Problem     Patient here today for check up. Last TSH was 6.80 on 19. She is taking levothyroxine 175 mcg daily    Diabetes Mellitus     Last A1C was 6.5 on 18. She is taking metformin 500 mg bid and glipizide xl 5mg daily. History of Present Illness:        Diabetes   She presents for her follow-up diabetic visit. She has type 2 diabetes mellitus. Her disease course has been stable. There are no hypoglycemic associated symptoms. Pertinent negatives for hypoglycemia include no dizziness, headaches or seizures. Pertinent negatives for diabetes include no chest pain, no polydipsia, no polyphagia and no polyuria. There are no hypoglycemic complications. Symptoms are stable. There are no diabetic complications. Risk factors for coronary artery disease include diabetes mellitus and obesity. Current diabetic treatment includes oral agent (dual therapy). Her weight is stable. She is following a generally healthy diet. Thyroid  Pt has had thyroid issues since . Pt was taking armour thyroid, but was switched to synthroid. Most recent TSH = 6.8. Pt having regular diarrhea and abd pain. Pt states \"I know it's my thyroid\". Pt's hair has been coarse.    Past Medical History:     Past Medical History:   Diagnosis Date    Arthritis     Diabetes mellitus ([address]     Graves disease     s/p radioactive iodine due to tachycardia ablation followed by hypothyroidism    Hypothyroidism     Tachycardia     Water retention       Reviewed all health maintenance requirements and ordered appropriate tests  Health Maintenance Due   Topic Date Due    Hepatitis C screen  1960    Pneumococcal 0-64 years Vaccine (1 of  - PPSV23) 09/10/1966    Diabetic foot exam  09/10/1970    Diabetic retinal exam  09/10/1970    HIV screen  09/10/1975    Diabetic microalbuminuria test 09/10/1978    Hepatitis B Vaccine (1 of 3 - Risk 3-dose series) 09/10/1979    DTaP/Tdap/Td vaccine (1 - Tdap) 09/10/1979    Shingles Vaccine (1 of 2) 09/10/2010    Colon cancer screen colonoscopy  09/10/2010       Past Surgical History:     Past Surgical History:   Procedure Laterality Date    APPENDECTOMY      BREAST LUMPECTOMY Left 2011    CHOLECYSTECTOMY      COLONOSCOPY  2011    OTHER SURGICAL HISTORY  1986    radioactive iodine thyroid ablation due to Grave's disease    [place] TOTAL KNEE ARTHROPLASTY Left 9/11/2018    KNEE TOTAL ARTHROPLASTY performed by Olaf Hartley MD at [address] [facility] Left 09/11/2018    Dr. Marcia Esparza        Medications:       Prior to Admission medications    Medication Sig Start Date End Date Taking? Authorizing Provider   levothyroxine (SYNTHROID) 200 MCG tablet Take 1 tablet by mouth daily 5/21/19  Yes GORDO Reed CNP   meloxicam (MOBIC) 7.5 MG tablet Take 1 tablet by mouth daily 2/5/19  Yes GORDO Reed CNP   furosemide (LASIX) 40 MG tablet 40 mg Takes one half tablet daily   Yes Historical Provider, MD   blood glucose monitor kit and supplies Check daily 12/4/18  Yes GORDO Reed CNP   blood glucose monitor strips Check daily 12/4/18  Yes GORDO Reed CNP   Lancets MISC Check blood sugar daily 12/4/18  Yes GORDO Reed CNP   diltiazem (CARTIA XT) 180 MG extended release capsule Take 180 mg by mouth daily   Yes Historical Provider, MD   metFORMIN (GLUCOPHAGE) 500 MG tablet Take 500 mg by mouth 2 times daily (with meals)   Yes Historical Provider, MD        Allergies:       Sulfa antibiotics and Percocet [oxycodone-acetaminophen]    Social History:     Tobacco:    reports that she has never smoked. She has never used smokeless tobacco.  Alcohol:      reports that she does not drink alcohol. Drug Use:  reports that she does not use drugs.     Family History:        Family History   Problem Relation Age of Onset    Cancer Father         lung       Review of Systems:         Review of Systems   Constitutional: Negative for chills and fever. Respiratory: Negative for cough and shortness of breath. Cardiovascular: Negative for chest pain and palpitations. Gastrointestinal: Positive for abdominal pain, diarrhea and nausea. Negative for blood in stool and vomiting. Endocrine: Negative for polydipsia, polyphagia and polyuria. Neurological: Negative for dizziness, seizures and headaches. Physical Exam:     Vitals:  /70   Pulse 94   Temp 98.2 °F (36.8 °C) (Oral)   Wt 251 lb (113.9 kg)   SpO2 98%   BMI 44.46 kg/m²       Physical Exam   Constitutional: She is oriented to person, place, and time. Vital signs are normal. She appears well-developed and well-nourished. HENT:   Head: Normocephalic. Right Ear: Hearing, tympanic membrane and external ear normal.   Left Ear: Hearing, tympanic membrane and external ear normal.   Nose: Nose normal.   Mouth/Throat: Uvula is midline, oropharynx is clear and moist and mucous membranes are normal.   Eyes: Pupils are equal, round, and reactive to light. Conjunctivae and EOM are normal.   Neck: Trachea normal and normal range of motion. Carotid bruit is not present. No thyroid mass present. Cardiovascular: Normal rate, regular rhythm, S1 normal, S2 normal, normal heart sounds and intact distal pulses. Pulses:       Dorsalis pedis pulses are 2+ on the right side, and 2+ on the left side. Pulmonary/Chest: Effort normal and breath sounds normal.   Abdominal: Soft. Normal appearance. There is no tenderness. Musculoskeletal: Normal range of motion. Neurological: She is alert and oriented to person, place, and time. She has normal strength and normal reflexes. GCS eye subscore is 4. GCS verbal subscore is 5. GCS motor subscore is 6. Skin: Skin is warm, dry and intact. No rash noted. Psychiatric: She has a normal mood and affect.  Her speech is normal and behavior is normal. Judgment and thought content normal. Cognition and memory are normal.   Nursing note and vitals reviewed. Data:     Lab Results   Component Value Date     12/09/2018    K 3.4 12/09/2018     12/09/2018    CO2 23 12/09/2018    BUN 17 12/09/2018    CREATININE 0.64 12/09/2018    GLUCOSE 220 12/09/2018    PROT 7.6 12/09/2018    LABALBU 3.9 12/09/2018    BILITOT 0.35 12/09/2018    ALKPHOS 94 12/09/2018    AST 21 12/09/2018    ALT 25 12/09/2018     Lab Results   Component Value Date    WBC 5.1 12/09/2018    RBC 4.53 12/09/2018    HGB 12.2 12/09/2018    HCT 36.8 12/09/2018    MCV 81.3 12/09/2018    MCH 27.0 12/09/2018    MCHC 33.2 12/09/2018    RDW 14.8 12/09/2018     12/09/2018    MPV NOT REPORTED 12/09/2018     Lab Results   Component Value Date    TSH 6.80 05/02/2019     Lab Results   Component Value Date    CHOL 152 12/09/2018    HDL 40 12/09/2018    LABA1C 6.0 05/21/2019          Assessment & Plan        Diagnosis Orders   1. Type 2 diabetes mellitus without complication, without long-term current use of insulin (HCC)  --A1c=6.0% today. Will discontinue glipizide. Continue metformin. Continue diet and exercise. POCT glycosylated hemoglobin (Hb A1C)    POCT microalbumin   2. Thyroid disease  --TSH = 6.8. Pt having symptoms of abd pain and diarrhea. Will increase synthroid to 200mcg daily and recheck in 6 weeks. levothyroxine (SYNTHROID) 200 MCG tablet    TSH With Reflex Ft4   3. Other specified hypothyroidism  levothyroxine (SYNTHROID) 200 MCG tablet    TSH With Reflex Ft4     Patient verbalizes understanding and agreement with plan. All questions answered. If symptoms do not resolve or worsen, return to office. Completed Refills   Requested Prescriptions     Signed Prescriptions Disp Refills    levothyroxine (SYNTHROID) 200 MCG tablet 90 tablet 1     Sig: Take 1 tablet by mouth daily     No follow-ups on file.      Orders Placed This Encounter Medications    levothyroxine (SYNTHROID) 200 MCG tablet     Sig: Take 1 tablet by mouth daily     Dispense:  90 tablet     Refill:  1     Orders Placed This Encounter   Procedures    TSH With Reflex Ft4     Standing Status:   Future     Standing Expiration Date:   5/21/2020    POCT glycosylated hemoglobin (Hb A1C)    POCT microalbumin         Patient Instructions     SURVEY:    You may be receiving a survey from [employer] regarding your visit today. Please complete the survey to enable us to provide the highest quality of care to you and your family. If you cannot score us a very good on any question, please call the office to discuss how we could have made your experience a very good one. Thank you. Electronically signed by GORDO Blevins CNP on 5/21/2019 at 7:30 AM           Completed Refills      Requested Prescriptions     Signed Prescriptions Disp Refills    levothyroxine (SYNTHROID) 200 MCG tablet 90 tablet 1     Sig: Take 1 tablet by mouth daily         Lisa Rush received counseling on the following healthy behaviors: nutrition, exercise and medication adherence  Reviewed prior labs and health maintenance. Continue current medications, diet and exercise. Discussed use, benefit, and side effects of prescribed medications. Barriers to medication compliance addressed. Patient given educational materials - see patient instructions. All patient questions answered. Patient voiced understanding.

## 2019-05-21 NOTE — PATIENT INSTRUCTIONS
SURVEY:    You may be receiving a survey from [employer] regarding your visit today. Please complete the survey to enable us to provide the highest quality of care to you and your family. If you cannot score us a very good on any question, please call the office to discuss how we could have made your experience a very good one. Thank you.

## 2019-07-17 RX ORDER — MELOXICAM 7.5 MG/1
TABLET ORAL
Qty: 90 TABLET | Refills: 1 | Status: SHIPPED | OUTPATIENT
Start: 2019-07-17 | End: 2020-01-13

## 2019-07-25 ENCOUNTER — TELEPHONE (OUTPATIENT)
Dept: FAMILY MEDICINE CLINIC | Age: 59
End: 2019-07-25

## 2019-07-25 DIAGNOSIS — Z12.31 ENCOUNTER FOR SCREENING MAMMOGRAM FOR BREAST CANCER: Primary | ICD-10-CM

## 2019-08-07 ENCOUNTER — HOSPITAL ENCOUNTER (OUTPATIENT)
Dept: MAMMOGRAPHY | Age: 59
Discharge: HOME | End: 2019-08-09
Payer: COMMERCIAL

## 2019-08-07 DIAGNOSIS — Z12.31 ENCOUNTER FOR SCREENING MAMMOGRAM FOR BREAST CANCER: ICD-10-CM

## 2019-08-07 PROCEDURE — 77067 SCR MAMMO BI INCL CAD: CPT

## 2019-09-04 RX ORDER — FUROSEMIDE 40 MG/1
40 TABLET ORAL DAILY
Qty: 60 TABLET | Refills: 3 | Status: SHIPPED | OUTPATIENT
Start: 2019-09-04 | End: 2019-09-04 | Stop reason: ALTCHOICE

## 2019-09-04 RX ORDER — FUROSEMIDE 20 MG/1
20 TABLET ORAL DAILY
Qty: 30 TABLET | Refills: 3 | Status: SHIPPED | OUTPATIENT
Start: 2019-09-04 | End: 2020-01-06 | Stop reason: SDUPTHER

## 2019-09-09 ENCOUNTER — TELEPHONE (OUTPATIENT)
Dept: FAMILY MEDICINE CLINIC | Age: 59
End: 2019-09-09

## 2019-09-10 DIAGNOSIS — Z12.11 COLON CANCER SCREENING: Primary | ICD-10-CM

## 2019-09-20 RX ORDER — GLIPIZIDE 5 MG/1
TABLET, FILM COATED, EXTENDED RELEASE ORAL
Qty: 90 TABLET | Refills: 4 | Status: SHIPPED | OUTPATIENT
Start: 2019-09-20 | End: 2020-12-15

## 2019-10-28 ENCOUNTER — OFFICE VISIT (OUTPATIENT)
Dept: FAMILY MEDICINE CLINIC | Age: 59
End: 2019-10-28
Payer: COMMERCIAL

## 2019-10-28 VITALS
TEMPERATURE: 97.7 F | BODY MASS INDEX: 44.46 KG/M2 | SYSTOLIC BLOOD PRESSURE: 122 MMHG | HEART RATE: 96 BPM | DIASTOLIC BLOOD PRESSURE: 80 MMHG | WEIGHT: 251 LBS | OXYGEN SATURATION: 97 %

## 2019-10-28 DIAGNOSIS — R21 SKIN RASH: ICD-10-CM

## 2019-10-28 DIAGNOSIS — K64.4 EXTERNAL HEMORRHOID, BLEEDING: Primary | ICD-10-CM

## 2019-10-28 PROCEDURE — 99213 OFFICE O/P EST LOW 20 MIN: CPT | Performed by: NURSE PRACTITIONER

## 2019-10-28 RX ORDER — CLOTRIMAZOLE AND BETAMETHASONE DIPROPIONATE 10; .64 MG/G; MG/G
CREAM TOPICAL
Qty: 30 G | Refills: 0 | Status: SHIPPED | OUTPATIENT
Start: 2019-10-28 | End: 2021-06-24

## 2019-10-28 ASSESSMENT — ENCOUNTER SYMPTOMS: HEMATOCHEZIA: 1

## 2019-12-09 ENCOUNTER — TELEPHONE (OUTPATIENT)
Dept: FAMILY MEDICINE CLINIC | Age: 59
End: 2019-12-09

## 2019-12-20 ENCOUNTER — TELEPHONE (OUTPATIENT)
Dept: FAMILY MEDICINE CLINIC | Age: 59
End: 2019-12-20

## 2019-12-20 ENCOUNTER — OFFICE VISIT (OUTPATIENT)
Dept: PRIMARY CARE CLINIC | Age: 59
End: 2019-12-20
Payer: COMMERCIAL

## 2019-12-20 VITALS
DIASTOLIC BLOOD PRESSURE: 85 MMHG | WEIGHT: 239 LBS | BODY MASS INDEX: 43.98 KG/M2 | HEIGHT: 62 IN | HEART RATE: 99 BPM | TEMPERATURE: 98 F | SYSTOLIC BLOOD PRESSURE: 140 MMHG

## 2019-12-20 DIAGNOSIS — J01.00 ACUTE NON-RECURRENT MAXILLARY SINUSITIS: Primary | ICD-10-CM

## 2019-12-20 PROCEDURE — 99213 OFFICE O/P EST LOW 20 MIN: CPT | Performed by: NURSE PRACTITIONER

## 2019-12-20 RX ORDER — BENZONATATE 100 MG/1
100-200 CAPSULE ORAL 3 TIMES DAILY PRN
Qty: 60 CAPSULE | Refills: 0 | Status: SHIPPED | OUTPATIENT
Start: 2019-12-20 | End: 2019-12-27

## 2019-12-20 RX ORDER — DOXYCYCLINE HYCLATE 100 MG/1
100 CAPSULE ORAL 2 TIMES DAILY
Qty: 14 CAPSULE | Refills: 0 | Status: SHIPPED | OUTPATIENT
Start: 2019-12-20 | End: 2019-12-27

## 2019-12-20 ASSESSMENT — ENCOUNTER SYMPTOMS
SINUS PRESSURE: 1
WHEEZING: 0
SINUS PAIN: 1
GASTROINTESTINAL NEGATIVE: 1
RHINORRHEA: 1
SHORTNESS OF BREATH: 0
COUGH: 1
ALLERGIC/IMMUNOLOGIC NEGATIVE: 1
SORE THROAT: 1
EYES NEGATIVE: 1

## 2020-01-06 RX ORDER — FUROSEMIDE 20 MG/1
20 TABLET ORAL DAILY
Qty: 90 TABLET | Refills: 1 | Status: SHIPPED | OUTPATIENT
Start: 2020-01-06 | End: 2020-01-09 | Stop reason: SDUPTHER

## 2020-01-06 NOTE — TELEPHONE ENCOUNTER
Metformin 500 mg BID  Lasix 20 mg    Mail order to [facility]    She called the pharmacy and they told her she doesn't have any refills. Last check up 5/21/19. Health Maintenance   Topic Date Due    Hepatitis C screen  1960    Diabetic foot exam  09/10/1970    Diabetic retinal exam  09/10/1970    DTaP/Tdap/Td vaccine (1 - Tdap) 09/10/1971    HIV screen  09/10/1975    Hepatitis B vaccine (1 of 3 - Risk 3-dose series) 09/10/1979    Shingles Vaccine (1 of 2) 09/10/2010    Potassium monitoring  12/09/2019    Creatinine monitoring  12/09/2019    Lipid screen  12/09/2019    Flu vaccine (1) 10/28/2020 (Originally 9/1/2019)    Pneumococcal 0-64 years Vaccine (1 of 1 - PPSV23) 10/28/2020 (Originally 9/10/1966)    A1C test (Diabetic or Prediabetic)  05/21/2020    Diabetic microalbuminuria test  05/21/2020    Breast cancer screen  08/07/2021    Cervical cancer screen  12/11/2021    Colon cancer screen colonoscopy  07/03/2023             (applicable per patient's age: Cancer Screenings, Depression Screening, Fall Risk Screening, Immunizations)    Hemoglobin A1C (%)   Date Value   05/21/2019 6.0   12/04/2018 6.5   07/21/2018 7.6 (H)     LDL Cholesterol (mg/dL)   Date Value   12/09/2018 87     AST (U/L)   Date Value   12/09/2018 21     ALT (U/L)   Date Value   12/09/2018 25     BUN (mg/dL)   Date Value   12/09/2018 17      (goal A1C is < 7)   (goal LDL is <100) need 30-50% reduction from baseline     BP Readings from Last 3 Encounters:   12/20/19 (!) 140/85   10/28/19 122/80   05/21/19 114/70    (goal /80)      All Future Testing planned in [facility]:  Lab Frequency Next Occurrence   TSH With Reflex Ft4 Once 02/11/2020   TSH With Reflex Ft4 Once 05/21/2020       Next Visit Date:  No future appointments.          Patient Active Problem List:     S/P knee surgery     Type 2 diabetes mellitus without complication, without long-term current use of insulin (HCC)     Thyroid disease     H/O sinus tachycardia     H/O radioactive iodine thyroid ablation     NASIR (obstructive sleep apnea)

## 2020-01-09 RX ORDER — FUROSEMIDE 20 MG/1
20 TABLET ORAL DAILY
Qty: 14 TABLET | Refills: 0 | Status: SHIPPED | OUTPATIENT
Start: 2020-01-09 | End: 2020-06-17

## 2020-01-09 NOTE — TELEPHONE ENCOUNTER
Refill request  Last OV: 10/28/2019  Last RX: 1/6/20  Next scheduled apt: 1/21/2020  Medication pended

## 2020-01-13 RX ORDER — MELOXICAM 7.5 MG/1
TABLET ORAL
Qty: 90 TABLET | Refills: 4 | Status: SHIPPED | OUTPATIENT
Start: 2020-01-13 | End: 2021-02-15 | Stop reason: SDUPTHER

## 2020-01-16 ENCOUNTER — HOSPITAL ENCOUNTER (OUTPATIENT)
Age: 60
Discharge: HOME | End: 2020-01-16
Payer: COMMERCIAL

## 2020-01-16 LAB — TSH SERPL DL<=0.05 MIU/L-ACNC: 0.97 MIU/L (ref 0.3–5)

## 2020-01-16 PROCEDURE — 84443 ASSAY THYROID STIM HORMONE: CPT

## 2020-01-16 PROCEDURE — 36415 COLL VENOUS BLD VENIPUNCTURE: CPT

## 2020-01-21 ENCOUNTER — OFFICE VISIT (OUTPATIENT)
Dept: FAMILY MEDICINE CLINIC | Age: 60
End: 2020-01-21
Payer: COMMERCIAL

## 2020-01-21 VITALS
WEIGHT: 236 LBS | OXYGEN SATURATION: 98 % | DIASTOLIC BLOOD PRESSURE: 70 MMHG | BODY MASS INDEX: 43.16 KG/M2 | HEART RATE: 84 BPM | SYSTOLIC BLOOD PRESSURE: 128 MMHG | TEMPERATURE: 97.7 F

## 2020-01-21 LAB — HBA1C MFR BLD: 5.8 %

## 2020-01-21 PROCEDURE — 83036 HEMOGLOBIN GLYCOSYLATED A1C: CPT | Performed by: NURSE PRACTITIONER

## 2020-01-21 PROCEDURE — 99214 OFFICE O/P EST MOD 30 MIN: CPT | Performed by: NURSE PRACTITIONER

## 2020-01-21 RX ORDER — LEVOTHYROXINE SODIUM 175 UG/1
175 TABLET ORAL DAILY
Qty: 90 TABLET | Refills: 1 | Status: SHIPPED | OUTPATIENT
Start: 2020-01-21 | End: 2020-01-27 | Stop reason: DRUGHIGH

## 2020-01-21 RX ORDER — LEVOTHYROXINE SODIUM 175 UG/1
175 TABLET ORAL DAILY
COMMUNITY
End: 2020-01-21 | Stop reason: SDUPTHER

## 2020-01-21 SDOH — ECONOMIC STABILITY: TRANSPORTATION INSECURITY
IN THE PAST 12 MONTHS, HAS LACK OF TRANSPORTATION KEPT YOU FROM MEETINGS, WORK, OR FROM GETTING THINGS NEEDED FOR DAILY LIVING?: NO

## 2020-01-21 SDOH — ECONOMIC STABILITY: FOOD INSECURITY: WITHIN THE PAST 12 MONTHS, THE FOOD YOU BOUGHT JUST DIDN'T LAST AND YOU DIDN'T HAVE MONEY TO GET MORE.: NEVER TRUE

## 2020-01-21 SDOH — ECONOMIC STABILITY: TRANSPORTATION INSECURITY
IN THE PAST 12 MONTHS, HAS THE LACK OF TRANSPORTATION KEPT YOU FROM MEDICAL APPOINTMENTS OR FROM GETTING MEDICATIONS?: NO

## 2020-01-21 SDOH — ECONOMIC STABILITY: INCOME INSECURITY: HOW HARD IS IT FOR YOU TO PAY FOR THE VERY BASICS LIKE FOOD, HOUSING, MEDICAL CARE, AND HEATING?: NOT HARD AT ALL

## 2020-01-21 SDOH — ECONOMIC STABILITY: FOOD INSECURITY: WITHIN THE PAST 12 MONTHS, YOU WORRIED THAT YOUR FOOD WOULD RUN OUT BEFORE YOU GOT MONEY TO BUY MORE.: NEVER TRUE

## 2020-01-21 ASSESSMENT — ENCOUNTER SYMPTOMS
VOMITING: 0
NAUSEA: 0
SHORTNESS OF BREATH: 0
DIARRHEA: 0
COUGH: 0

## 2020-01-21 ASSESSMENT — PATIENT HEALTH QUESTIONNAIRE - PHQ9
SUM OF ALL RESPONSES TO PHQ QUESTIONS 1-9: 0
2. FEELING DOWN, DEPRESSED OR HOPELESS: 0
SUM OF ALL RESPONSES TO PHQ9 QUESTIONS 1 & 2: 0
1. LITTLE INTEREST OR PLEASURE IN DOING THINGS: 0
SUM OF ALL RESPONSES TO PHQ QUESTIONS 1-9: 0

## 2020-01-21 NOTE — PROGRESS NOTES
HPI Notes    Name: Larena Barthel  : 1960         Chief Complaint:     Chief Complaint   Patient presents with    Hypothyroidism     Patient here today for check up.  Diabetes Mellitus     Last A1C was 6.0 . History of Present Illness:        Diabetes   She presents for her follow-up diabetic visit. She has type 2 diabetes mellitus. Her disease course has been stable. There are no hypoglycemic associated symptoms. Pertinent negatives for hypoglycemia include no dizziness, headaches or seizures. Associated symptoms include weight loss (intentional). Pertinent negatives for diabetes include no chest pain, no fatigue and no polyphagia. Symptoms are stable. Risk factors for coronary artery disease include diabetes mellitus, obesity and post-menopausal. Current diabetic treatment includes oral agent (dual therapy). She is following a generally healthy diet. She never participates in exercise. An ACE inhibitor/angiotensin II receptor blocker is not being taken. Thyroid  Pt has a known hx of hypothyroidism. Most recent TSH = 0.9%. pt is not having any s/s of hypothyroidism.   Past Medical History:     Past Medical History:   Diagnosis Date    Arthritis     Diabetes mellitus ([facility] [address]     Graves disease     s/p radioactive iodine due to tachycardia ablation followed by hypothyroidism    Hypothyroidism     Tachycardia     Water retention       Reviewed all health maintenance requirements and ordered appropriate tests  Health Maintenance Due   Topic Date Due    Hepatitis C screen  1960    Diabetic foot exam  09/10/1970    Diabetic retinal exam  09/10/1970    DTaP/Tdap/Td vaccine (1 - Tdap) 09/10/1971    HIV screen  09/10/1975    Hepatitis B vaccine (1 of 3 - Risk 3-dose series) 09/10/1979    Shingles Vaccine (1 of 2) 09/10/2010    Lipid screen  2019    Potassium monitoring  2019    Creatinine monitoring  2019       Past Surgical History:     Past Surgical History:   Procedure Laterality Date    APPENDECTOMY      BREAST LUMPECTOMY Left 2011    CHOLECYSTECTOMY      COLONOSCOPY  2011    OTHER SURGICAL HISTORY  1986    radioactive iodine thyroid ablation due to Grave's disease    [place] TOTAL KNEE ARTHROPLASTY Left 9/11/2018    KNEE TOTAL ARTHROPLASTY performed by Amber Do MD at [place] [address] Left 09/11/2018    Dr. Ponce Knife        Medications:       Prior to Admission medications    Medication Sig Start Date End Date Taking? Authorizing Provider   metFORMIN (GLUCOPHAGE) 500 MG tablet Take 1 tablet by mouth 2 times daily (with meals) 1/21/20  Yes Yo MalesGORDO - CNP   levothyroxine (SYNTHROID) 175 MCG tablet Take 1 tablet by mouth Daily 1/21/20  Yes Yulyia Males, GORDO - CNP   meloxicam (MOBIC) 7.5 MG tablet TAKE 1 TABLET DAILY 1/13/20  Yes Yulyia Males, GORDO - CNP   furosemide (LASIX) 20 MG tablet Take 1 tablet by mouth daily 1/9/20  Yes Yulyia MalesGORDO - CNP   clotrimazole-betamethasone (LOTRISONE) 1-0.05 % cream Apply topically 2 times daily. 10/28/19  Yes Yulyia MalesGORDO CNP   glipiZIDE (GLUCOTROL XL) 5 MG extended release tablet TAKE 1 TABLET DAILY 9/20/19  Yes Yo MalesGORDO CNP   Respiratory Therapy Supplies MISC CPAP Mask and Hose Size medium 9/16/19  Yes Arkaleyia MalesGORDO CNP   blood glucose monitor kit and supplies Check daily 12/4/18  Yes Yo MalesGORDO CNP   blood glucose monitor strips Check daily 12/4/18  Yes Yo MalesGORDO CNP   Lancets MISC Check blood sugar daily 12/4/18  Yes Ardelia MalesGORDO - CNP   diltiazem (CARTIA XT) 180 MG extended release capsule Take 180 mg by mouth daily   Yes Historical Provider, MD        Allergies:       Sulfa antibiotics and Percocet [oxycodone-acetaminophen]    Social History:     Tobacco:    reports that she has never smoked.  She has never used smokeless tobacco.  Alcohol:      reports no history of alcohol  12/09/2018    CO2 23 12/09/2018    BUN 17 12/09/2018    CREATININE 0.64 12/09/2018    GLUCOSE 220 12/09/2018    PROT 7.6 12/09/2018    LABALBU 3.9 12/09/2018    BILITOT 0.35 12/09/2018    ALKPHOS 94 12/09/2018    AST 21 12/09/2018    ALT 25 12/09/2018     Lab Results   Component Value Date    WBC 5.1 12/09/2018    RBC 4.53 12/09/2018    HGB 12.2 12/09/2018    HCT 36.8 12/09/2018    MCV 81.3 12/09/2018    MCH 27.0 12/09/2018    MCHC 33.2 12/09/2018    RDW 14.8 12/09/2018     12/09/2018    MPV NOT REPORTED 12/09/2018     Lab Results   Component Value Date    TSH 0.97 01/16/2020     Lab Results   Component Value Date    CHOL 152 12/09/2018    HDL 40 12/09/2018    LABA1C 6.0 05/21/2019          Assessment & Plan        Diagnosis Orders   1. Type 2 diabetes mellitus without complication, without long-term current use of insulin (HCC)  --Hemoglobin A1c 5.8% today (was 6.0%). Continue metformin and glipizide. Continue diet control. HM DIABETES FOOT EXAM    POCT glycosylated hemoglobin (Hb A1C)   2. Thyroid disease   --TSH equals 0.97. Previous symptoms of coarse hair, abdominal pain, and diarrhea have abated. Continue Synthroid at 175 mcg daily. Patient verbalizes understanding and agreement with plan. All questions answered. If symptoms do not resolve or worsen, return to office. Completed Refills   Requested Prescriptions     Signed Prescriptions Disp Refills    metFORMIN (GLUCOPHAGE) 500 MG tablet 180 tablet 1     Sig: Take 1 tablet by mouth 2 times daily (with meals)    levothyroxine (SYNTHROID) 175 MCG tablet 90 tablet 1     Sig: Take 1 tablet by mouth Daily     No follow-ups on file.      Orders Placed This Encounter   Medications    metFORMIN (GLUCOPHAGE) 500 MG tablet     Sig: Take 1 tablet by mouth 2 times daily (with meals)     Dispense:  180 tablet     Refill:  1    levothyroxine (SYNTHROID) 175 MCG tablet     Sig: Take 1 tablet by mouth Daily     Dispense:  90 tablet

## 2020-01-27 RX ORDER — LEVOTHYROXINE SODIUM 0.2 MG/1
TABLET ORAL
Qty: 90 TABLET | Refills: 1 | Status: SHIPPED | OUTPATIENT
Start: 2020-01-27 | End: 2020-06-03 | Stop reason: ALTCHOICE

## 2020-01-27 RX ORDER — LEVOTHYROXINE SODIUM 0.2 MG/1
200 TABLET ORAL DAILY
COMMUNITY
End: 2020-07-27

## 2020-01-27 NOTE — TELEPHONE ENCOUNTER
Please find out the dose that pt was taking in July, Aug up to the point when I saw her in Jan. If she was at 200mcg, that is what I wanted to continue because her tsh was good and she felt good.

## 2020-01-27 NOTE — TELEPHONE ENCOUNTER
Last OV: 1/21/2020  Last RX: Synthroid 175 mcg   Next scheduled apt: 1/29/2020    Please advise; in A&P it states to continue Synthroid at 175 mcg daily.

## 2020-01-29 ENCOUNTER — OFFICE VISIT (OUTPATIENT)
Dept: FAMILY MEDICINE CLINIC | Age: 60
End: 2020-01-29
Payer: COMMERCIAL

## 2020-01-29 VITALS
WEIGHT: 236 LBS | DIASTOLIC BLOOD PRESSURE: 70 MMHG | HEART RATE: 98 BPM | HEIGHT: 62 IN | BODY MASS INDEX: 43.43 KG/M2 | SYSTOLIC BLOOD PRESSURE: 120 MMHG | OXYGEN SATURATION: 98 %

## 2020-01-29 PROCEDURE — 20610 DRAIN/INJ JOINT/BURSA W/O US: CPT | Performed by: FAMILY MEDICINE

## 2020-01-29 RX ORDER — TRIAMCINOLONE ACETONIDE 40 MG/ML
40 INJECTION, SUSPENSION INTRA-ARTICULAR; INTRAMUSCULAR ONCE
Status: COMPLETED | OUTPATIENT
Start: 2020-01-29 | End: 2020-01-29

## 2020-01-29 RX ADMIN — TRIAMCINOLONE ACETONIDE 40 MG: 40 INJECTION, SUSPENSION INTRA-ARTICULAR; INTRAMUSCULAR at 07:04

## 2020-01-29 NOTE — PROGRESS NOTES
DAILY 9/20/19   Keily Mojica APRMARI Swartz CNP   Respiratory Therapy Supplies MISC CPAP Mask and Hose Size medium 9/16/19   GORDO May CNP   blood glucose monitor kit and supplies Check daily 12/4/18   GORDO May CNP   blood glucose monitor strips Check daily 12/4/18   GORDO May CNP   Lancets MISC Check blood sugar daily 12/4/18   GORDO May CNP   diltiazem (CARTIA XT) 180 MG extended release capsule Take 180 mg by mouth daily    Historical Provider, MD        Allergies:       Sulfa antibiotics and Percocet [oxycodone-acetaminophen]    Review ofSystems:     Positive and Negative as described in HPI    Review of Systems   Constitutional: Negative. Skin: Negative. Neurological: Negative. Physical Exam:     Vitals:  /70   Pulse 98   Ht 5' 2\" (1.575 m)   Wt 236 lb (107 kg)   SpO2 98%   BMI 43.16 kg/m²   Physical Exam  Vitals signs and nursing note reviewed. Constitutional:       General: She is not in acute distress. Appearance: She is well-developed. Pulmonary:      Effort: Pulmonary effort is normal.   Musculoskeletal:      Comments: R knee bony hypertrophy. No effusion, swelling, erythema, or skin lesions   Skin:     General: Skin is warm and dry. Neurological:      Mental Status: She is alert and oriented to person, place, and time. Psychiatric:         Mood and Affect: Mood normal.         Behavior: Behavior normal.         Judgment: Judgment normal.       R knee injection: Verbal consent obtained. Patient in the seated position with feet dangling from exam table. Site identified and marked for anterolateral approach. Prepped with Betadine and alcohol. Injected Kenalog 40 mg and lidocaine 1% plain 2 mL using 25-gauge, 1.5 inch needle. Tolerated well, no complications.     Data:     Lab Results   Component Value Date     12/09/2018    K 3.4 12/09/2018     12/09/2018    CO2 23 12/09/2018    BUN 17 12/09/2018    CREATININE 0.64 12/09/2018    GLUCOSE 220 12/09/2018    PROT 7.6 12/09/2018    LABALBU 3.9 12/09/2018    BILITOT 0.35 12/09/2018    ALKPHOS 94 12/09/2018    AST 21 12/09/2018    ALT 25 12/09/2018     Lab Results   Component Value Date    WBC 5.1 12/09/2018    RBC 4.53 12/09/2018    HGB 12.2 12/09/2018    HCT 36.8 12/09/2018    MCV 81.3 12/09/2018    MCH 27.0 12/09/2018    MCHC 33.2 12/09/2018    RDW 14.8 12/09/2018     12/09/2018    MPV NOT REPORTED 12/09/2018     Lab Results   Component Value Date    TSH 0.97 01/16/2020     Lab Results   Component Value Date    CHOL 152 12/09/2018    HDL 40 12/09/2018    LABA1C 5.8 01/21/2020         Assessment & Plan:        Diagnosis Orders   1. Chronic pain of right knee  triamcinolone acetonide (KENALOG-40) injection 40 mg    20610 - [place] DRAIN/INJECT LARGE JOINT/BURSA   Right knee pain with known arthritis. Right knee x-ray not available for my review, but as patient had confirmed left total knee (did review that x-ray in PACS system) and has already had an injection in the right knee I thought it was more than reasonable to repeat the injection. Last one had been over a year ago per her report. Performed without difficulty. May continue current medications. She does do the exercises that she was taught during her recovery from the left TKA. Continue this. Follow-up as needed. Requested Prescriptions      No prescriptions requested or ordered in this encounter         Patient Instructions   SURVEY:    You may be receiving a survey from [employer] regarding your visit today. You may get this in the mail, through your [employer] or in your email. Please complete the survey to enable us to provide the highest quality of care to you and your family. If you cannot score us as very good ( 5 Stars) on any question, please feel free to call the office to discuss how we could have made your experience exceptional.     Thank you.     Clinical Care Team:  DO Fei Munoz, [address] [place] [address] Team:  Liu Charlotte received counseling on the following healthy behaviors: exercise  Reviewed prior labs and health maintenance. Continue current medications, diet and exercise. Discussed use, benefit, and side effects of prescribed medications. Barriers to medication compliance addressed. Patient given educational materials - see patient instructions. All patient questions answered. Patient voiced understanding.        Electronically signed by Leidy Alonso DO on 1/29/2020 at 7:20 AM  [place] [id number]  Dept: [phone]

## 2020-06-03 ENCOUNTER — OFFICE VISIT (OUTPATIENT)
Dept: FAMILY MEDICINE CLINIC | Age: 60
End: 2020-06-03
Payer: COMMERCIAL

## 2020-06-03 VITALS
HEART RATE: 111 BPM | DIASTOLIC BLOOD PRESSURE: 70 MMHG | OXYGEN SATURATION: 98 % | HEIGHT: 62 IN | TEMPERATURE: 98.2 F | WEIGHT: 245 LBS | SYSTOLIC BLOOD PRESSURE: 130 MMHG | BODY MASS INDEX: 45.08 KG/M2

## 2020-06-03 PROCEDURE — 20610 DRAIN/INJ JOINT/BURSA W/O US: CPT | Performed by: FAMILY MEDICINE

## 2020-06-03 RX ORDER — TRIAMCINOLONE ACETONIDE 40 MG/ML
40 INJECTION, SUSPENSION INTRA-ARTICULAR; INTRAMUSCULAR ONCE
Status: COMPLETED | OUTPATIENT
Start: 2020-06-03 | End: 2020-06-03

## 2020-06-03 RX ADMIN — TRIAMCINOLONE ACETONIDE 40 MG: 40 INJECTION, SUSPENSION INTRA-ARTICULAR; INTRAMUSCULAR at 06:30

## 2020-06-03 NOTE — PROGRESS NOTES
supplies Check daily 12/4/18   GORDO Holland CNP   blood glucose monitor strips Check daily 12/4/18   GORDO Holland CNP   Lancets MISC Check blood sugar daily 12/4/18   GORDO Holland CNP   diltiazem (CARTIA XT) 180 MG extended release capsule Take 180 mg by mouth daily    Historical Provider, MD        Allergies:       Sulfa antibiotics and Percocet [oxycodone-acetaminophen]    Review ofSystems:     Positive and Negative as described in HPI    Review of Systems    Physical Exam:     Vitals:  /70   Pulse 111   Temp 98.2 °F (36.8 °C)   Ht 5' 2\" (1.575 m)   Wt 245 lb (111.1 kg)   SpO2 98%   BMI 44.81 kg/m²   Physical Exam    R knee injection: Verbal consent obtained. Patient in the seated position with feet dangling from exam table. Site identified and marked for anterolateral approach. Prepped with Betadine and alcohol. Injected Kenalog 40 mg and lidocaine 1% plain 2 mL using 25-gauge, 1.5 inch needle. Tolerated well, no complications. Assessment & Plan:        Diagnosis Orders   1. Chronic pain of right knee  triamcinolone acetonide (KENALOG-40) injection 40 mg    [address] - [place] DRAIN/INJECT LARGE JOINT/BURSA   Chronic right knee pain related osteoarthritis, helped by last triamcinolone injection. This was repeated today without difficulty. Follow-up as needed, may repeat injection as needed, no sooner than 3 months. Take glipizide twice a day for the next couple days as well as metformin. Requested Prescriptions      No prescriptions requested or ordered in this encounter         There are no Patient Instructions on file for this visit. Arnoldo Youngrogeralycia received counseling on the following healthy behaviors: medication adherence  Reviewed prior labs and health maintenance. Continue current medications, diet and exercise. Discussed use, benefit, and side effects of prescribed medications. Barriers to medication compliance addressed.    Patient given

## 2020-06-17 RX ORDER — FUROSEMIDE 20 MG/1
TABLET ORAL
Qty: 90 TABLET | Refills: 3 | Status: SHIPPED | OUTPATIENT
Start: 2020-06-17 | End: 2021-02-15 | Stop reason: SDUPTHER

## 2020-07-27 RX ORDER — LEVOTHYROXINE SODIUM 0.2 MG/1
TABLET ORAL
Qty: 90 TABLET | Refills: 3 | Status: SHIPPED | OUTPATIENT
Start: 2020-07-27 | End: 2021-02-15 | Stop reason: SDUPTHER

## 2020-10-15 ENCOUNTER — TELEPHONE (OUTPATIENT)
Dept: FAMILY MEDICINE CLINIC | Age: 60
End: 2020-10-15

## 2020-10-15 LAB
BUN BLDV-MCNC: 17 MG/DL
CALCIUM SERPL-MCNC: 9.1 MG/DL
CHLORIDE BLD-SCNC: 102 MMOL/L
CHOLESTEROL, TOTAL: 191 MG/DL
CHOLESTEROL/HDL RATIO: 4.3
CO2: 26 MMOL/L
CREAT SERPL-MCNC: 0.7 MG/DL
GFR CALCULATED: >60
GLUCOSE BLD-MCNC: 111 MG/DL
HDLC SERPL-MCNC: 44 MG/DL (ref 35–70)
LDL CHOLESTEROL CALCULATED: 108 MG/DL (ref 0–160)
NONHDLC SERPL-MCNC: NORMAL MG/DL
POTASSIUM SERPL-SCNC: 3.9 MMOL/L
SODIUM BLD-SCNC: 142 MMOL/L
TRIGL SERPL-MCNC: 194 MG/DL
TSH SERPL DL<=0.05 MIU/L-ACNC: 1.03 UIU/ML
VLDLC SERPL CALC-MCNC: NORMAL MG/DL

## 2020-10-15 NOTE — TELEPHONE ENCOUNTER
Patient asking for an order for her mammogram - last done 08/07/2019    Health Maintenance   Topic Date Due    Hepatitis C screen  1960    HIV screen  09/10/1975    DTaP/Tdap/Td vaccine (1 - Tdap) 09/10/1979    Shingles Vaccine (1 of 2) 09/10/2010    Lipid screen  12/09/2019    Potassium monitoring  12/09/2019    Creatinine monitoring  12/09/2019    Diabetic microalbuminuria test  05/21/2020    Flu vaccine (1) 09/01/2020    Pneumococcal 0-64 years Vaccine (1 of 1 - PPSV23) 10/28/2020 (Originally 9/10/1966)    Diabetic foot exam  01/21/2021    A1C test (Diabetic or Prediabetic)  01/21/2021    Diabetic retinal exam  01/21/2021    Breast cancer screen  08/07/2021    Cervical cancer screen  12/11/2021    Colon cancer screen colonoscopy  07/03/2023    Hepatitis A vaccine  Aged Out    Hib vaccine  Aged Out    Meningococcal (ACWY) vaccine  Aged Out             (applicable per patient's age: Cancer Screenings, Depression Screening, Fall Risk Screening, Immunizations)    Hemoglobin A1C (%)   Date Value   01/21/2020 5.8   05/21/2019 6.0   12/04/2018 6.5     LDL Cholesterol (mg/dL)   Date Value   12/09/2018 87     AST (U/L)   Date Value   12/09/2018 21     ALT (U/L)   Date Value   12/09/2018 25     BUN (mg/dL)   Date Value   12/09/2018 17      (goal A1C is < 7)   (goal LDL is <100) need 30-50% reduction from baseline     BP Readings from Last 3 Encounters:   06/03/20 130/70   01/29/20 120/70   01/21/20 128/70    (goal /80)      All Future Testing planned in [facility]:  Lab Frequency Next Occurrence   Hemoglobin A1C Once 04/05/2020       Next Visit Date:  No future appointments.          Patient Active Problem List:     S/P knee surgery     Type 2 diabetes mellitus without complication, without long-term current use of insulin (HCC)     Thyroid disease     H/O sinus tachycardia     H/O radioactive iodine thyroid ablation     NASIR (obstructive sleep apnea)

## 2020-10-28 ENCOUNTER — HOSPITAL ENCOUNTER (OUTPATIENT)
Dept: MAMMOGRAPHY | Age: 60
Discharge: HOME | End: 2020-10-30
Payer: COMMERCIAL

## 2020-10-28 PROCEDURE — 77063 BREAST TOMOSYNTHESIS BI: CPT

## 2020-12-15 RX ORDER — GLIPIZIDE 5 MG/1
TABLET, FILM COATED, EXTENDED RELEASE ORAL
Qty: 90 TABLET | Refills: 3 | Status: SHIPPED | OUTPATIENT
Start: 2020-12-15 | End: 2021-02-22 | Stop reason: ALTCHOICE

## 2020-12-16 ENCOUNTER — OFFICE VISIT (OUTPATIENT)
Dept: FAMILY MEDICINE CLINIC | Age: 60
End: 2020-12-16
Payer: COMMERCIAL

## 2020-12-16 VITALS
DIASTOLIC BLOOD PRESSURE: 78 MMHG | TEMPERATURE: 97.2 F | SYSTOLIC BLOOD PRESSURE: 120 MMHG | OXYGEN SATURATION: 98 % | HEART RATE: 110 BPM | WEIGHT: 254 LBS | BODY MASS INDEX: 46.46 KG/M2

## 2020-12-16 LAB
BILIRUBIN, POC: ABNORMAL
BLOOD URINE, POC: ABNORMAL
CLARITY, POC: ABNORMAL
COLOR, POC: ABNORMAL
GLUCOSE URINE, POC: 100
KETONES, POC: ABNORMAL
LEUKOCYTE EST, POC: ABNORMAL
NITRITE, POC: ABNORMAL
PH, POC: 5
PROTEIN, POC: ABNORMAL
SPECIFIC GRAVITY, POC: 1.02
UROBILINOGEN, POC: 0.2

## 2020-12-16 PROCEDURE — 81002 URINALYSIS NONAUTO W/O SCOPE: CPT | Performed by: STUDENT IN AN ORGANIZED HEALTH CARE EDUCATION/TRAINING PROGRAM

## 2020-12-16 PROCEDURE — 99214 OFFICE O/P EST MOD 30 MIN: CPT | Performed by: STUDENT IN AN ORGANIZED HEALTH CARE EDUCATION/TRAINING PROGRAM

## 2020-12-16 ASSESSMENT — ENCOUNTER SYMPTOMS
BACK PAIN: 0
COUGH: 0
RHINORRHEA: 0
ABDOMINAL PAIN: 0
VOMITING: 0
NAUSEA: 0
DIARRHEA: 0
SINUS PAIN: 0
WHEEZING: 0

## 2020-12-16 NOTE — PATIENT INSTRUCTIONS
I believe you have a strain of one of your groin muscles. However, you have a bit of blood in the urine. I'm not concerned about anything big or scary based on your story, exam and recent labs so I believe all we will need to do is a kidney ultrasound. W  We'll be in touch with results. Double up on your [facility] for 3 days. Thank you for coming to see me today! It was wonderful to meet you! Please give me a call if you have any other questions or problems, and I will see you at your next visit! Dr. Michelle Mcdonald:    Narinder Zapata may be receiving a survey from [employer] regarding your visit today. Please complete the survey to enable us to provide the highest quality of care to you and your family. If you cannot score us a very good on any question, please call the office to discuss how we could of made your experience a very good one. Thank you.

## 2020-12-16 NOTE — PROGRESS NOTES
HPI Notes    Name: Bradford Javed  : 1960         Chief Complaint:     Chief Complaint   Patient presents with    Abdominal Pain     Patient states she is having lower Right abdominal pain . She states its been going on since 2020. She thought maybe she pulled a muscle but she started with urinary frequency yesterday and worse this morning. History of Present Illness:        HPI    Patient states she is having lower Right abdominal pain . She states its been going on since 2020. 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 She thought maybe she pulled a muscle but she started with urinary frequency yesterday and worse this morning. Located in inguinal fold, describes it as an achy pain, no radiation. Somewhat relieved by hot compresses, worsened by standing. No problems with motion, gait.      Past Medical History:     Past Medical History:   Diagnosis Date    Arthritis     Diabetes mellitus ([facility] [address]     Graves disease     s/p radioactive iodine due to tachycardia ablation followed by hypothyroidism    Hypothyroidism     Tachycardia     Water retention       Reviewed all health maintenance requirements and ordered appropriate tests  Health Maintenance Due   Topic Date Due    Hepatitis C screen  1960    Pneumococcal 0-64 years Vaccine (1 of 1 - PPSV23) 09/10/1966    HIV screen  09/10/1975    DTaP/Tdap/Td vaccine (1 - Tdap) 09/10/1979    Shingles Vaccine (1 of 2) 09/10/2010    Diabetic microalbuminuria test  2020    Flu vaccine (1) 2020       Past Surgical History:     Past Surgical History:   Procedure Laterality Date    APPENDECTOMY      BREAST LUMPECTOMY Left     CHOLECYSTECTOMY      COLONOSCOPY      OTHER SURGICAL HISTORY      radioactive iodine thyroid ablation due to Grave's disease    [place] TOTAL KNEE ARTHROPLASTY Left 2018    KNEE TOTAL ARTHROPLASTY performed by Curt Gudino MD at [address] [place] [address] TOTAL KNEE ARTHROPLASTY Left 09/11/2018    Dr. Aranza Souza        Medications:       Prior to Admission medications    Medication Sig Start Date End Date Taking? Authorizing Provider   glipiZIDE (GLUCOTROL XL) 5 MG extended release tablet TAKE 1 TABLET DAILY 12/15/20  Yes Breanne Guy DO   levothyroxine (SYNTHROID) 200 MCG tablet TAKE 1 TABLET DAILY 7/27/20  Yes Brock Cabrales MD   metFORMIN (GLUCOPHAGE) 500 MG tablet TAKE 1 TABLET TWICE A DAY WITH MEALS 7/20/20  Yes GORDO Patino CNP   furosemide (LASIX) 20 MG tablet TAKE 1 TABLET DAILY 6/17/20  Yes GORDO Patino CNP   meloxicam (MOBIC) 7.5 MG tablet TAKE 1 TABLET DAILY 1/13/20  Yes GORDO Patino CNP   clotrimazole-betamethasone (LOTRISONE) 1-0.05 % cream Apply topically 2 times daily. 10/28/19  Yes GORDO Patino CNP   Respiratory Therapy Supplies MISC CPAP Mask and Hose Size medium 9/16/19  Yes GORDO Patino CNP   blood glucose monitor kit and supplies Check daily 12/4/18  Yes GORDO Patino CNP   blood glucose monitor strips Check daily 12/4/18  Yes GORDO Patino CNP   Lancets MISC Check blood sugar daily 12/4/18  Yes GORDO Patino CNP   diltiazem (CARTIA XT) 180 MG extended release capsule Take 180 mg by mouth daily   Yes Historical Provider, MD        Allergies:       Sulfa antibiotics and Percocet [oxycodone-acetaminophen]    Social History:     Tobacco:    reports that she has never smoked. She has never used smokeless tobacco.  Alcohol:      reports no history of alcohol use. Drug Use:  reports no history of drug use. Family History:     Family History   Problem Relation Age of Onset    Cancer Father         lung       Review of Systems:         Review of Systems   Constitutional: Negative for fever. HENT: Negative for rhinorrhea, sinus pain and sneezing. Respiratory: Negative for cough and wheezing. Cardiovascular: Negative for chest pain. 0.35 12/09/2018    ALKPHOS 94 12/09/2018    AST 21 12/09/2018    ALT 25 12/09/2018     Lab Results   Component Value Date    WBC 5.1 12/09/2018    RBC 4.53 12/09/2018    HGB 12.2 12/09/2018    HCT 36.8 12/09/2018    MCV 81.3 12/09/2018    MCH 27.0 12/09/2018    MCHC 33.2 12/09/2018    RDW 14.8 12/09/2018     12/09/2018    MPV NOT REPORTED 12/09/2018     Lab Results   Component Value Date    TSH 1.03 10/15/2020     Lab Results   Component Value Date    CHOL 191 10/15/2020    HDL 44 10/15/2020    LABA1C 5.8 01/21/2020          Assessment & Plan        Diagnosis Orders   1. Right inguinal pain     2. Strain of muscle of right groin region     3. Urinary frequency  POCT Urinalysis no Micro   4. Asymptomatic microscopic hematuria         1. History and physical examination point more towards a strain of the right groin muscle, however patient does have asymptomatic hematuria and lysed red blood cells on urinalysis. I do not believe she has a urinary tract infection, however this will need evaluated. I would recommend a ultrasound of the retroperitoneal space, particularly the kidneys to exclude hydronephrosis, neoplasm, stone. May follow-up as needed for this problem. Completed Refills   Requested Prescriptions      No prescriptions requested or ordered in this encounter     Return if symptoms worsen or fail to improve. No orders of the defined types were placed in this encounter. No orders of the defined types were placed in this encounter. Patient Instructions   I believe you have a strain of one of your groin muscles. However, you have a bit of blood in the urine. I'm not concerned about anything big or scary based on your story, exam and recent labs so I believe all we will need to do is a kidney ultrasound. W  We'll be in touch with results. Double up on your [facility] for 3 days. Thank you for coming to see me today! It was wonderful to meet you!   Please give me a call if you have any other questions or problems, and I will see you at your next visit! Dr. Irlanda Esqueda:    Deandre Bell may be receiving a survey from [employer] regarding your visit today. Please complete the survey to enable us to provide the highest quality of care to you and your family. If you cannot score us a very good on any question, please call the office to discuss how we could of made your experience a very good one. Thank you. Electronically signed by Lm Fernandez DO on 12/16/2020 at 8:48 AM           Completed Refills   Requested Prescriptions      No prescriptions requested or ordered in this encounter         Iola received counseling on the following healthy behaviors: nutrition and exercise  Reviewed prior labs and health maintenance. Continue current medications, diet and exercise. Discussed use, benefit, and side effects of prescribed medications. Barriers to medication compliance addressed. Patient given educational materials - see patient instructions. All patient questions answered. Patient voiced understanding.

## 2020-12-21 ENCOUNTER — TELEPHONE (OUTPATIENT)
Dept: FAMILY MEDICINE CLINIC | Age: 60
End: 2020-12-21

## 2020-12-21 ENCOUNTER — HOSPITAL ENCOUNTER (OUTPATIENT)
Dept: ULTRASOUND IMAGING | Age: 60
Discharge: HOME | End: 2020-12-23
Payer: COMMERCIAL

## 2020-12-21 PROCEDURE — 76775 US EXAM ABDO BACK WALL LIM: CPT

## 2020-12-21 NOTE — TELEPHONE ENCOUNTER
Starr Teresa would like the results of her ultrasound. Can she please get a call back? Health Maintenance   Topic Date Due    Hepatitis C screen  1960    Pneumococcal 0-64 years Vaccine (1 of 1 - PPSV23) 09/10/1966    HIV screen  09/10/1975    DTaP/Tdap/Td vaccine (1 - Tdap) 09/10/1979    Shingles Vaccine (1 of 2) 09/10/2010    Diabetic microalbuminuria test  05/21/2020    Flu vaccine (1) 09/01/2020    Diabetic foot exam  01/21/2021    A1C test (Diabetic or Prediabetic)  01/21/2021    Diabetic retinal exam  01/21/2021    Lipid screen  10/15/2021    Potassium monitoring  10/15/2021    Creatinine monitoring  10/15/2021    Cervical cancer screen  12/11/2021    Breast cancer screen  10/28/2022    Colon cancer screen colonoscopy  07/03/2023    Hepatitis A vaccine  Aged Out    Hib vaccine  Aged Out    Meningococcal (ACWY) vaccine  Aged Out             (applicable per patient's age: Cancer Screenings, Depression Screening, Fall Risk Screening, Immunizations)    Hemoglobin A1C (%)   Date Value   01/21/2020 5.8   05/21/2019 6.0   12/04/2018 6.5     LDL Cholesterol (mg/dL)   Date Value   12/09/2018 87     LDL Calculated (mg/dL)   Date Value   10/15/2020 108     AST (U/L)   Date Value   12/09/2018 21     ALT (U/L)   Date Value   12/09/2018 25     BUN (mg/dL)   Date Value   10/15/2020 17      (goal A1C is < 7)   (goal LDL is <100) need 30-50% reduction from baseline     BP Readings from Last 3 Encounters:   12/16/20 120/78   06/03/20 130/70   01/29/20 120/70    (goal /80)      All Future Testing planned in [facility]:  Lab Frequency Next Occurrence   Hemoglobin A1C Once 04/05/2020       Next Visit Date:  No future appointments.          Patient Active Problem List:     S/P knee surgery     Type 2 diabetes mellitus without complication, without long-term current use of insulin (HCC)     Thyroid disease     H/O sinus tachycardia     H/O radioactive iodine thyroid ablation     NASIR (obstructive sleep

## 2020-12-28 ENCOUNTER — PATIENT MESSAGE (OUTPATIENT)
Dept: FAMILY MEDICINE CLINIC | Age: 60
End: 2020-12-28

## 2020-12-28 NOTE — TELEPHONE ENCOUNTER
From: Radha Gentile  To: Irene Arriaza, APRN - CNP  Sent: 12/28/2020 9:09 AM EST  Subject: Non-Urgent Medical Question    Tanner, cut my hand [place] and it's still a bit swollen and sore. I opened the door and the wind sent the door back and I caught it on the bar that is in the side of the door. I think I got a tetanus shot when I had my knee done 2 years ago so I think I am ok there. Didn't know if I need something for infection.  Thanks Gerry Centeno

## 2021-01-22 ENCOUNTER — TELEPHONE (OUTPATIENT)
Dept: FAMILY MEDICINE CLINIC | Age: 61
End: 2021-01-22

## 2021-01-22 DIAGNOSIS — Z12.11 SCREENING FOR MALIGNANT NEOPLASM OF COLON: ICD-10-CM

## 2021-01-22 DIAGNOSIS — K64.4 EXTERNAL HEMORRHOID, BLEEDING: Primary | ICD-10-CM

## 2021-01-22 NOTE — TELEPHONE ENCOUNTER
Alesia Feliz has a hemorrhoid that is bleeding again and is also due for a colonoscopy. She would like to know if she can be referred to Dr. Shavonne El in [place] [address]. Please let Alesia Feliz know. Health Maintenance   Topic Date Due    Hepatitis C screen  1960    Pneumococcal 0-64 years Vaccine (1 of 1 - PPSV23) 09/10/1966    HIV screen  09/10/1975    DTaP/Tdap/Td vaccine (1 - Tdap) 09/10/1979    Shingles Vaccine (1 of 2) 09/10/2010    Diabetic microalbuminuria test  05/21/2020    Flu vaccine (1) 09/01/2020    Diabetic foot exam  01/21/2021    A1C test (Diabetic or Prediabetic)  01/21/2021    Diabetic retinal exam  01/21/2021    Lipid screen  10/15/2021    Potassium monitoring  10/15/2021    Creatinine monitoring  10/15/2021    Cervical cancer screen  12/11/2021    Breast cancer screen  10/28/2022    Colon cancer screen colonoscopy  07/03/2023    Hepatitis A vaccine  Aged Out    Hib vaccine  Aged Out    Meningococcal (ACWY) vaccine  Aged Out             (applicable per patient's age: Cancer Screenings, Depression Screening, Fall Risk Screening, Immunizations)    Hemoglobin A1C (%)   Date Value   01/21/2020 5.8   05/21/2019 6.0   12/04/2018 6.5     LDL Cholesterol (mg/dL)   Date Value   12/09/2018 87     LDL Calculated (mg/dL)   Date Value   10/15/2020 108     AST (U/L)   Date Value   12/09/2018 21     ALT (U/L)   Date Value   12/09/2018 25     BUN (mg/dL)   Date Value   10/15/2020 17      (goal A1C is < 7)   (goal LDL is <100) need 30-50% reduction from baseline     BP Readings from Last 3 Encounters:   12/16/20 120/78   06/03/20 130/70   01/29/20 120/70    (goal /80)      All Future Testing planned in [facility]:  Lab Frequency Next Occurrence       Next Visit Date:  No future appointments.          Patient Active Problem List:     S/P knee surgery     Type 2 diabetes mellitus without complication, without long-term current use of insulin (HCC)     Thyroid disease     H/O sinus tachycardia     H/O radioactive iodine thyroid ablation     NASIR (obstructive sleep apnea)

## 2021-02-02 ENCOUNTER — APPOINTMENT (OUTPATIENT)
Dept: GENERAL RADIOLOGY | Age: 61
End: 2021-02-02
Payer: COMMERCIAL

## 2021-02-02 ENCOUNTER — HOSPITAL ENCOUNTER (EMERGENCY)
Age: 61
Discharge: HOME | End: 2021-02-02
Attending: EMERGENCY MEDICINE
Payer: COMMERCIAL

## 2021-02-02 VITALS
SYSTOLIC BLOOD PRESSURE: 167 MMHG | BODY MASS INDEX: 47.01 KG/M2 | WEIGHT: 257 LBS | OXYGEN SATURATION: 97 % | DIASTOLIC BLOOD PRESSURE: 94 MMHG | HEART RATE: 113 BPM | TEMPERATURE: 98 F | RESPIRATION RATE: 18 BRPM

## 2021-02-02 DIAGNOSIS — M54.41 ACUTE RIGHT-SIDED LOW BACK PAIN WITH RIGHT-SIDED SCIATICA: Primary | ICD-10-CM

## 2021-02-02 PROCEDURE — 99284 EMERGENCY DEPT VISIT MOD MDM: CPT

## 2021-02-02 PROCEDURE — 96372 THER/PROPH/DIAG INJ SC/IM: CPT

## 2021-02-02 PROCEDURE — 72110 X-RAY EXAM L-2 SPINE 4/>VWS: CPT

## 2021-02-02 PROCEDURE — 6360000002 HC RX W HCPCS: Performed by: EMERGENCY MEDICINE

## 2021-02-02 RX ORDER — HYDROCODONE BITARTRATE AND ACETAMINOPHEN 5; 325 MG/1; MG/1
1 TABLET ORAL EVERY 6 HOURS PRN
COMMUNITY
End: 2021-02-15 | Stop reason: ALTCHOICE

## 2021-02-02 RX ORDER — ORPHENADRINE CITRATE 30 MG/ML
60 INJECTION INTRAMUSCULAR; INTRAVENOUS ONCE
Status: COMPLETED | OUTPATIENT
Start: 2021-02-02 | End: 2021-02-02

## 2021-02-02 RX ORDER — CYCLOBENZAPRINE HCL 5 MG
5 TABLET ORAL 2 TIMES DAILY PRN
Qty: 10 TABLET | Refills: 0 | Status: SHIPPED | OUTPATIENT
Start: 2021-02-02 | End: 2021-02-12

## 2021-02-02 RX ORDER — PREDNISONE 20 MG/1
TABLET ORAL
Qty: 10 TABLET | Refills: 0 | Status: SHIPPED | OUTPATIENT
Start: 2021-02-02 | End: 2021-02-15 | Stop reason: ALTCHOICE

## 2021-02-02 RX ORDER — FENTANYL CITRATE 50 UG/ML
100 INJECTION, SOLUTION INTRAMUSCULAR; INTRAVENOUS ONCE
Status: DISCONTINUED | OUTPATIENT
Start: 2021-02-02 | End: 2021-02-02 | Stop reason: HOSPADM

## 2021-02-02 RX ORDER — KETOROLAC TROMETHAMINE 30 MG/ML
30 INJECTION, SOLUTION INTRAMUSCULAR; INTRAVENOUS ONCE
Status: COMPLETED | OUTPATIENT
Start: 2021-02-02 | End: 2021-02-02

## 2021-02-02 RX ORDER — FENTANYL CITRATE 50 UG/ML
100 INJECTION, SOLUTION INTRAMUSCULAR; INTRAVENOUS ONCE
Status: COMPLETED | OUTPATIENT
Start: 2021-02-02 | End: 2021-02-02

## 2021-02-02 RX ADMIN — ORPHENADRINE CITRATE 60 MG: 30 INJECTION INTRAMUSCULAR; INTRAVENOUS at 17:11

## 2021-02-02 RX ADMIN — FENTANYL CITRATE 100 MCG: 50 INJECTION, SOLUTION INTRAMUSCULAR; INTRAVENOUS at 17:10

## 2021-02-02 RX ADMIN — KETOROLAC TROMETHAMINE 30 MG: 30 INJECTION, SOLUTION INTRAMUSCULAR at 18:47

## 2021-02-02 ASSESSMENT — PAIN SCALES - GENERAL
PAINLEVEL_OUTOF10: 10
PAINLEVEL_OUTOF10: 7
PAINLEVEL_OUTOF10: 10

## 2021-02-02 ASSESSMENT — PAIN DESCRIPTION - ORIENTATION: ORIENTATION: LOWER;LEFT

## 2021-02-02 ASSESSMENT — PAIN DESCRIPTION - DESCRIPTORS: DESCRIPTORS: ACHING;SPASM

## 2021-02-02 ASSESSMENT — PAIN DESCRIPTION - PAIN TYPE: TYPE: ACUTE PAIN

## 2021-02-02 NOTE — ED PROVIDER NOTES
eMERGENCY dEPARTMENT eNCOUnter        [facility]    Chief Complaint   Patient presents with    Back Pain     shoveled snow, then around 1330 bent over to  paper. [place] a pop followed by a tremendous amount of back pain. HPI    Quinton Hanley is a 61 y.o. female who presents to ED from home. By EMS. With complaint of R low back pain. Onset prior to arrival.  Patient was brought to emergency room by EMS. Patient states that she shoveled snow today and around 1:30 PM patient bent down to  a paper and heard a \"pop\". Patient presents with severe right-sided low back pain. The pain radiates down to her right leg. Intensity of symptoms severe pain in the right lower back with radiation to her right leg. Right low back with radiation to the right leg. Patient states that she had similar symptoms over 20 years ago. Patient denies any other back injury. Denies fever denies chills denies any other symptoms. Patient has history of diabetes and arthritis. REVIEW OF SYSTEMS    All systems reviewed and positives are in the HPI.      PAST MEDICAL HISTORY    Past Medical History:   Diagnosis Date    Arthritis     Diabetes mellitus ([address]     Graves disease 1986    s/p radioactive iodine due to tachycardia ablation followed by hypothyroidism    Hypothyroidism     Tachycardia     Water retention        SURGICAL HISTORY    Past Surgical History:   Procedure Laterality Date    APPENDECTOMY      BREAST LUMPECTOMY Left 2011    CHOLECYSTECTOMY      COLONOSCOPY  2011    OTHER SURGICAL HISTORY  1986    radioactive iodine thyroid ablation due to Grave's disease    [place] TOTAL KNEE ARTHROPLASTY Left 9/11/2018    KNEE TOTAL ARTHROPLASTY performed by Sterling Sherman MD at [address] [facility] Left 09/11/2018    Dr. Chelsie Castillo      Current Outpatient Rx   Medication Sig Dispense Refill    HYDROcodone-acetaminophen (NORCO) 5-325 MG per tablet Take 1 tablet by mouth every 6 hours as needed for Pain.  cyclobenzaprine (FLEXERIL) 5 MG tablet Take 1 tablet by mouth 2 times daily as needed for Muscle spasms 10 tablet 0    predniSONE (DELTASONE) 20 MG tablet 2 tablets daily x3 days then 1 tablet daily 10 tablet 0    glipiZIDE (GLUCOTROL XL) 5 MG extended release tablet TAKE 1 TABLET DAILY 90 tablet 3    levothyroxine (SYNTHROID) 200 MCG tablet TAKE 1 TABLET DAILY 90 tablet 3    metFORMIN (GLUCOPHAGE) 500 MG tablet TAKE 1 TABLET TWICE A DAY WITH MEALS 180 tablet 3    furosemide (LASIX) 20 MG tablet TAKE 1 TABLET DAILY 90 tablet 3    diltiazem (CARTIA XT) 180 MG extended release capsule Take 180 mg by mouth daily      meloxicam (MOBIC) 7.5 MG tablet TAKE 1 TABLET DAILY 90 tablet 4    clotrimazole-betamethasone (LOTRISONE) 1-0.05 % cream Apply topically 2 times daily.  30 g 0    Respiratory Therapy Supplies MISC CPAP Mask and Hose Size medium 1 each 0    blood glucose monitor kit and supplies Check daily 1 kit 0    blood glucose monitor strips Check daily 100 strip 1    Lancets MISC Check blood sugar daily 100 each 3       ALLERGIES      Allergies   Allergen Reactions    Sulfa Antibiotics Swelling     Throat swells     Percocet [Oxycodone-Acetaminophen] Nausea And Vomiting       FAMILY HISTORY    Family History   Problem Relation Age of Onset    Cancer Father         lung       SOCIAL HISTORY    Social History     Socioeconomic History    Marital status:      Spouse name: None    Number of children: None    Years of education: None    Highest education level: None   Occupational History    None   Social Needs    Financial resource strain: Not hard at all   Kristel-Sumaya insecurity     Worry: Never true     Inability: Never true    Transportation needs     Medical: No     Non-medical: No   Tobacco Use    Smoking status: Never Smoker    Smokeless tobacco: Never Used   Substance and Sexual Activity    Alcohol use: No    Drug use: No    Sexual activity: None Lifestyle    Physical activity     Days per week: None     Minutes per session: None    Stress: None   Relationships    Social connections     Talks on phone: None     Gets together: None     Attends Muslim service: None     Active member of club or organization: None     Attends meetings of clubs or organizations: None     Relationship status: None    Intimate partner violence     Fear of current or ex partner: None     Emotionally abused: None     Physically abused: None     Forced sexual activity: None   Other Topics Concern    None   Social History Narrative    None       PHYSICAL EXAM    VITAL SIGNS: BP (!) 167/94   Pulse 113   Temp 98 °F (36.7 °C) (Oral)   Resp 18   Wt 257 lb (116.6 kg)   SpO2 97%   BMI 47.01 kg/m²    Constitutional: Ill-appearing female brought to ED by EMS with severe right low abdominal pain HENT:  Atraumatic, external ears normal, nose normal, oropharynx moist. Neck- normal range of motion, no tenderness, supple   Respiratory:  No respiratory distress, normal breath sounds. Cardiovascular:  Normal rate, normal rhythm, no murmurs, no gallops, no rubs   GI:  Soft, nondistended, nontender, no organomegaly, no mass, no rebound, no guarding   :  No costovertebral angle tenderness   Musculoskeletal:  No edema, no tenderness, no deformities. Back-right lumbar pain with radiation to her right leg  Integument:  Well hydrated   Neurologic: Straight leg raise positive. Patient has no bowel or urine dysfunction. No saddle anesthesia. No weakness or numbness of the right lower extremity. Patient has pain rating posteriorly to her right right lower extremity. EKG        RADIOLOGY/PROCEDURES    XR LUMBAR SPINE (MIN 4 VIEWS)   Final Result         1. No evidence of compression fracture or severe malalignment of the lumbar    spine. There is straightening of the normal lumbar lordosis suggesting muscle    spasming. 2. Degenerative changes as noted above. Labs  Labs Reviewed - No data to display        Summation      Patient Course: Patient is given Norflex and fentanyl in ED. Patient is sent for x-ray of her lower spine. ED Medications administered this visit:    Medications   fentaNYL (SUBLIMAZE) injection 100 mcg (has no administration in time range)   fentaNYL (SUBLIMAZE) injection 100 mcg (100 mcg Intramuscular Given 2/2/21 1710)   orphenadrine (NORFLEX) injection 60 mg (60 mg Intramuscular Given 2/2/21 1711)   ketorolac (TORADOL) injection 30 mg (30 mg Intramuscular Given 2/2/21 1847)       New Prescriptions from this visit:    New Prescriptions    CYCLOBENZAPRINE (FLEXERIL) 5 MG TABLET    Take 1 tablet by mouth 2 times daily as needed for Muscle spasms    PREDNISONE (DELTASONE) 20 MG TABLET    2 tablets daily x3 days then 1 tablet daily       Follow-up:  GORDO Galan - [facility]  [phone] [phone]      As needed, If symptoms worsen. Return to ED if worse. Final Impression:   1.  Acute right-sided low back pain with right-sided sciatica               (Please note that portions of this note were completed with a voice recognition program.  Efforts were made to edit the dictations but occasionally words are mis-transcribed.)        Kale Thurston MD  02/03/21 2612

## 2021-02-15 ENCOUNTER — OFFICE VISIT (OUTPATIENT)
Dept: FAMILY MEDICINE CLINIC | Age: 61
End: 2021-02-15
Payer: COMMERCIAL

## 2021-02-15 VITALS
WEIGHT: 254 LBS | SYSTOLIC BLOOD PRESSURE: 128 MMHG | OXYGEN SATURATION: 99 % | HEIGHT: 62 IN | HEART RATE: 111 BPM | DIASTOLIC BLOOD PRESSURE: 76 MMHG | BODY MASS INDEX: 46.74 KG/M2

## 2021-02-15 DIAGNOSIS — E89.0 POSTABLATIVE HYPOTHYROIDISM: ICD-10-CM

## 2021-02-15 DIAGNOSIS — E11.9 TYPE 2 DIABETES MELLITUS WITHOUT COMPLICATION, WITHOUT LONG-TERM CURRENT USE OF INSULIN (HCC): ICD-10-CM

## 2021-02-15 DIAGNOSIS — G89.29 CHRONIC PAIN OF RIGHT KNEE: Primary | ICD-10-CM

## 2021-02-15 DIAGNOSIS — M25.561 CHRONIC PAIN OF RIGHT KNEE: Primary | ICD-10-CM

## 2021-02-15 LAB
CREATININE URINE POCT: 300
HBA1C MFR BLD: 6.4 %
MICROALBUMIN/CREAT 24H UR: 150 MG/G{CREAT}
MICROALBUMIN/CREAT UR-RTO: NORMAL

## 2021-02-15 PROCEDURE — 82044 UR ALBUMIN SEMIQUANTITATIVE: CPT | Performed by: FAMILY MEDICINE

## 2021-02-15 PROCEDURE — 99214 OFFICE O/P EST MOD 30 MIN: CPT | Performed by: FAMILY MEDICINE

## 2021-02-15 PROCEDURE — 83036 HEMOGLOBIN GLYCOSYLATED A1C: CPT | Performed by: FAMILY MEDICINE

## 2021-02-15 PROCEDURE — 20610 DRAIN/INJ JOINT/BURSA W/O US: CPT | Performed by: FAMILY MEDICINE

## 2021-02-15 RX ORDER — LEVOTHYROXINE SODIUM 0.2 MG/1
TABLET ORAL
Qty: 90 TABLET | Refills: 3 | Status: SHIPPED | OUTPATIENT
Start: 2021-02-15 | End: 2022-01-17 | Stop reason: DRUGHIGH

## 2021-02-15 RX ORDER — FUROSEMIDE 20 MG/1
TABLET ORAL
Qty: 90 TABLET | Refills: 3 | Status: SHIPPED | OUTPATIENT
Start: 2021-02-15 | End: 2022-03-14

## 2021-02-15 RX ORDER — TRIAMCINOLONE ACETONIDE 40 MG/ML
40 INJECTION, SUSPENSION INTRA-ARTICULAR; INTRAMUSCULAR ONCE
Status: COMPLETED | OUTPATIENT
Start: 2021-02-15 | End: 2021-02-15

## 2021-02-15 RX ORDER — GLIPIZIDE 5 MG/1
TABLET, FILM COATED, EXTENDED RELEASE ORAL
Qty: 90 TABLET | Refills: 3 | Status: CANCELLED | OUTPATIENT
Start: 2021-02-15

## 2021-02-15 RX ORDER — MELOXICAM 7.5 MG/1
TABLET ORAL
Qty: 90 TABLET | Refills: 3 | Status: SHIPPED | OUTPATIENT
Start: 2021-02-15 | End: 2022-05-02 | Stop reason: SDUPTHER

## 2021-02-15 RX ORDER — DILTIAZEM HYDROCHLORIDE 180 MG/1
180 CAPSULE, COATED, EXTENDED RELEASE ORAL DAILY
Qty: 90 CAPSULE | Refills: 1 | Status: SHIPPED | OUTPATIENT
Start: 2021-02-15 | End: 2021-10-18

## 2021-02-15 RX ADMIN — TRIAMCINOLONE ACETONIDE 40 MG: 40 INJECTION, SUSPENSION INTRA-ARTICULAR; INTRAMUSCULAR at 15:48

## 2021-02-15 ASSESSMENT — PATIENT HEALTH QUESTIONNAIRE - PHQ9
2. FEELING DOWN, DEPRESSED OR HOPELESS: 0
SUM OF ALL RESPONSES TO PHQ QUESTIONS 1-9: 0
1. LITTLE INTEREST OR PLEASURE IN DOING THINGS: 0
SUM OF ALL RESPONSES TO PHQ9 QUESTIONS 1 & 2: 0

## 2021-02-15 NOTE — PATIENT INSTRUCTIONS
SURVEY:    You may be receiving a survey from [employer] regarding your visit today. You may get this in the mail, through your MyChart or in your email. Please complete the survey to enable us to provide the highest quality of care to you and your family. If you cannot score us as very good ( 5 Stars) on any question, please feel free to call the office to discuss how we could have made your experience exceptional.     Thank you.     Clinical Care Team:  DO Dominguez Bustillos, [address] [place] [address] Team:  [address] [place] [address]

## 2021-02-15 NOTE — PROGRESS NOTES
Name: Odell Garces  : 1960         Chief Complaint:     Chief Complaint   Patient presents with    Diabetes    Knee Pain     right. History of Present Illness:      Odell Garces is a 61 y.o.  female who presents with Diabetes and Knee Pain (right. )      HPI     F/u R knee pain. Continues to have significant pain, saw ortho and was planning for TKA but surgeon told pt she needs to lose 30# prior to surgery. Significant pain relief from steroid injections in the past and is requesting same today. Surgeon aware she'll be having this. Will not be having surgery in the next 3-4 mos. Otherwise doing well, compliant with meds for DM and hypothyroid. Again she does plan to start aggressively working on weight loss. Past Medical History:     Past Medical History:   Diagnosis Date    Arthritis     Diabetes mellitus ([facility] [address] [facility]     Graves disease     s/p radioactive iodine due to tachycardia ablation followed by hypothyroidism    Hypothyroidism     Tachycardia     Water retention         Past Surgical History:     Past Surgical History:   Procedure Laterality Date    APPENDECTOMY      BREAST LUMPECTOMY Left     CHOLECYSTECTOMY      COLONOSCOPY      OTHER SURGICAL HISTORY      radioactive iodine thyroid ablation due to Grave's disease    MS TOTAL KNEE ARTHROPLASTY Left 2018    KNEE TOTAL ARTHROPLASTY performed by Amanda Go MD at [address] [facility] Left 2018    Dr. Cole Pike        Medications:       Prior to Admission medications    Medication Sig Start Date End Date Taking?  Authorizing Provider   levothyroxine (SYNTHROID) 200 MCG tablet TAKE 1 TABLET DAILY 2/15/21  Yes Phan Berry DO   metFORMIN (GLUCOPHAGE) 500 MG tablet TAKE 2 TABLETS TWICE A DAY WITH MEALS 2/15/21  Yes Phan Berry DO   furosemide (LASIX) 20 MG tablet TAKE 1 TABLET DAILY 2/15/21  Yes Phan Berry DO   meloxicam (MOBIC) 7.5 MG tablet TAKE 1 TABLET DAILY 2/15/21 Yes Cara Molina, DO   dilTIAZem (CARTIA XT) 180 MG extended release capsule Take 1 capsule by mouth daily 2/15/21  Yes Cara Molina, DO   glipiZIDE (GLUCOTROL XL) 5 MG extended release tablet TAKE 1 TABLET DAILY 12/15/20  Yes Anushka Lopes, DO   clotrimazole-betamethasone (LOTRISONE) 1-0.05 % cream Apply topically 2 times daily. 10/28/19  Yes GORDO Kwon CNP   Respiratory Therapy Supplies MISC CPAP Mask and Hose Size medium 9/16/19  Yes GORDO Kwon CNP   blood glucose monitor kit and supplies Check daily 12/4/18  Yes GORDO Kwon CNP   blood glucose monitor strips Check daily 12/4/18  Yes GORDO Kwon CNP   Lancets MISC Check blood sugar daily 12/4/18  Yes GORDO Kwon CNP        Allergies:       Sulfa antibiotics and Percocet [oxycodone-acetaminophen]    Social History:     Tobacco:    reports that she has never smoked. She has never used smokeless tobacco.  Alcohol:      reports no history of alcohol use. Drug Use:  reports no history of drug use. Family History:     Family History   Problem Relation Age of Onset    Cancer Father         lung       Review of Systems:     Positive and Negative as described in HPI    Review of Systems   Constitutional: Negative. Respiratory: Negative. Gastrointestinal: Negative. Skin: Negative. Physical Exam:     Vitals:  /76   Pulse 111   Ht 5' 2\" (1.575 m)   Wt 254 lb (115.2 kg)   SpO2 99%   BMI 46.46 kg/m²   Physical Exam  Vitals signs and nursing note reviewed. Constitutional:       General: She is not in acute distress. Appearance: Normal appearance. She is well-developed. She is not ill-appearing. Cardiovascular:      Rate and Rhythm: Normal rate and regular rhythm. Heart sounds: Normal heart sounds. Pulmonary:      Effort: Pulmonary effort is normal.      Breath sounds: Normal breath sounds.    Neurological:      Mental Status: She is alert and oriented to person, place, and time. Psychiatric:         Mood and Affect: Mood normal.         Behavior: Behavior normal.       R knee injection: Verbal consent obtained.  Patient in the seated position with feet dangling from exam table.  Site identified and marked for anterolateral approach.  Prepped with Betadine and alcohol.  Injected Kenalog 40 mg and lidocaine 1% plain 2 mL using 25-gauge, 1.5 inch needle.  Tolerated well, no complications. Data:     Lab Results   Component Value Date     10/15/2020    K 3.9 10/15/2020     10/15/2020    CO2 26 10/15/2020    BUN 17 10/15/2020    CREATININE 0.70 10/15/2020    GLUCOSE 111 10/15/2020    PROT 7.6 12/09/2018    LABALBU 3.9 12/09/2018    BILITOT 0.35 12/09/2018    ALKPHOS 94 12/09/2018    AST 21 12/09/2018    ALT 25 12/09/2018     Lab Results   Component Value Date    WBC 5.1 12/09/2018    RBC 4.53 12/09/2018    HGB 12.2 12/09/2018    HCT 36.8 12/09/2018    MCV 81.3 12/09/2018    MCH 27.0 12/09/2018    MCHC 33.2 12/09/2018    RDW 14.8 12/09/2018     12/09/2018    MPV NOT REPORTED 12/09/2018     Lab Results   Component Value Date    TSH 1.03 10/15/2020     Lab Results   Component Value Date    CHOL 191 10/15/2020    HDL 44 10/15/2020    LABA1C 6.4 02/15/2021         Assessment & Plan:        Diagnosis Orders   1. Chronic pain of right knee  triamcinolone acetonide (KENALOG-40) injection 40 mg    20610 - DC DRAIN/INJECT LARGE JOINT/BURSA   2. Type 2 diabetes mellitus without complication, without long-term current use of insulin (HCC)  POCT glycosylated hemoglobin (Hb A1C)    POCT microalbumin   3. Postablative hypothyroidism     Chronic right knee pain continues, reports she had an updated knee x-ray recently showing worse arthritis, bone-on-bone disease. However, she does need to lose about 30 pounds before surgeon will perform her knee replacement. Steroid injection performed for pain relief.   Patient aware that this will likely cause hyperglycemia for the next few days and of the temporary nature of relief provided. Work on diet and exercise. May continue meloxicam daily. Diabetes and hypothyroid which have been controlled. Reviewed labs. Continue current Rx. Refills given. Requested Prescriptions     Signed Prescriptions Disp Refills    levothyroxine (SYNTHROID) 200 MCG tablet 90 tablet 3     Sig: TAKE 1 TABLET DAILY    metFORMIN (GLUCOPHAGE) 500 MG tablet 180 tablet 3     Sig: TAKE 2 TABLETS TWICE A DAY WITH MEALS    furosemide (LASIX) 20 MG tablet 90 tablet 3     Sig: TAKE 1 TABLET DAILY    meloxicam (MOBIC) 7.5 MG tablet 90 tablet 3     Sig: TAKE 1 TABLET DAILY    dilTIAZem (CARTIA XT) 180 MG extended release capsule 90 capsule 1     Sig: Take 1 capsule by mouth daily       Patient Instructions   SURVEY:    You may be receiving a survey from [employer] regarding your visit today. You may get this in the mail, through your MyChart or in your email. Please complete the survey to enable us to provide the highest quality of care to you and your family. If you cannot score us as very good ( 5 Stars) on any question, please feel free to call the office to discuss how we could have made your experience exceptional.     Thank you. Clinical Care Team:  Dr. Rosario Jauregui, DO Lucien Cleveland, [facility] Team:  Leon Simpson received counseling on the following healthy behaviors: nutrition, exercise and medication adherence  Reviewed prior labs and health maintenance. Continue current medications, diet and exercise. Discussed use, benefit, and side effects of prescribed medications. Barriers to medication compliance addressed. Patient given educational materials - see patient instructions. All patient questions answered. Patient voiced understanding.      Electronically signed by Reginald Hermosillo DO on 2/19/2021 at 3:46 PM   [address] [facility] [address]  Dept: [phone]

## 2021-02-19 PROBLEM — Z92.3 H/O RADIOACTIVE IODINE THYROID ABLATION: Status: RESOLVED | Noted: 2018-09-12 | Resolved: 2021-02-19

## 2021-02-19 PROBLEM — E89.0 POSTABLATIVE HYPOTHYROIDISM: Status: ACTIVE | Noted: 2021-02-19

## 2021-02-19 ASSESSMENT — ENCOUNTER SYMPTOMS
GASTROINTESTINAL NEGATIVE: 1
RESPIRATORY NEGATIVE: 1

## 2021-02-22 ENCOUNTER — OFFICE VISIT (OUTPATIENT)
Dept: FAMILY MEDICINE CLINIC | Age: 61
End: 2021-02-22
Payer: COMMERCIAL

## 2021-02-22 VITALS
SYSTOLIC BLOOD PRESSURE: 122 MMHG | BODY MASS INDEX: 45.54 KG/M2 | WEIGHT: 249 LBS | OXYGEN SATURATION: 98 % | HEART RATE: 80 BPM | TEMPERATURE: 97.8 F | DIASTOLIC BLOOD PRESSURE: 80 MMHG

## 2021-02-22 DIAGNOSIS — B02.9 HERPES ZOSTER WITHOUT COMPLICATION: Primary | ICD-10-CM

## 2021-02-22 PROCEDURE — 99213 OFFICE O/P EST LOW 20 MIN: CPT | Performed by: NURSE PRACTITIONER

## 2021-02-22 RX ORDER — ACYCLOVIR 50 MG/G
OINTMENT TOPICAL
Qty: 15 G | Refills: 0 | Status: SHIPPED | OUTPATIENT
Start: 2021-02-22 | End: 2021-03-01

## 2021-02-22 RX ORDER — CYCLOBENZAPRINE HCL 10 MG
TABLET ORAL
COMMUNITY
Start: 2021-02-19 | End: 2021-06-24

## 2021-02-22 ASSESSMENT — ENCOUNTER SYMPTOMS
SHORTNESS OF BREATH: 0
DIARRHEA: 0
VOMITING: 0
NAUSEA: 0
COUGH: 0

## 2021-02-22 NOTE — PROGRESS NOTES
HPI Notes    Name: Marium Waldrop  : 1960         Chief Complaint:     Chief Complaint   Patient presents with    Rash     Patient here today for area on right side of back. Started about 1 week ago. Area is itching and painful. History of Present Illness:        HPI  Pt is a 60 yo female who presents with a rash to her right middle back. Started about 1 week ago. Pt states that the rash is itchy and burning. The pain feel \"very deep\". States that there were some small blisters in the middle of the rash 4-5 days ago. Her spouse squeezed them and clear fluid came out. Pt tried putting lotrisone on the rash and states that it got \"a little better\". Past Medical History:     Past Medical History:   Diagnosis Date    Arthritis     Diabetes mellitus ([facility] [address] [facility]     Graves disease     s/p radioactive iodine due to tachycardia ablation followed by hypothyroidism    Hypothyroidism     Tachycardia     Water retention       Reviewed all health maintenance requirements and ordered appropriate tests  Health Maintenance Due   Topic Date Due    Hepatitis C screen  1960    Pneumococcal 0-64 years Vaccine (1 of 1 - PPSV23) 09/10/1966    HIV screen  09/10/1975    DTaP/Tdap/Td vaccine (1 - Tdap) 09/10/1979    Shingles Vaccine (1 of 2) 09/10/2010    Flu vaccine (1) 2020    Diabetic foot exam  2021    Diabetic retinal exam  2021       Past Surgical History:     Past Surgical History:   Procedure Laterality Date    APPENDECTOMY      BREAST LUMPECTOMY Left     CHOLECYSTECTOMY      COLONOSCOPY      OTHER SURGICAL HISTORY      radioactive iodine thyroid ablation due to Grave's disease    [place] TOTAL KNEE ARTHROPLASTY Left 2018    KNEE TOTAL ARTHROPLASTY performed by Noa Bangura MD at [address] [place] [address] Left 2018    Dr. Rosen Peers        Medications:       Prior to Admission medications    Medication Sig Start Date End Date Taking?  Authorizing Provider   cyclobenzaprine (FLEXERIL) 10 MG tablet TAKE 1 TABLET BY MOUTH EVERY 8 HOURS AS NEEDED FOR MUSCLE SPASM. DO NOT DRIVE. CAN CAUSE DROWSINESS 2/19/21  Yes Historical Provider, MD   acyclovir (ZOVIRAX) 5 % ointment Apply topically 5 times daily. 2/22/21 3/1/21 Yes GORDO Santos CNP   levothyroxine (SYNTHROID) 200 MCG tablet TAKE 1 TABLET DAILY 2/15/21  Yes Tammystvaldez Ket, DO   metFORMIN (GLUCOPHAGE) 500 MG tablet TAKE 2 TABLETS TWICE A DAY WITH MEALS 2/15/21  Yes Tammystvaldez Ket, DO   furosemide (LASIX) 20 MG tablet TAKE 1 TABLET DAILY 2/15/21  Yes Tammystvaldez Ket, DO   meloxicam (MOBIC) 7.5 MG tablet TAKE 1 TABLET DAILY 2/15/21  Yes Jorge Vanegas, DO   dilTIAZem (CARTIA XT) 180 MG extended release capsule Take 1 capsule by mouth daily 2/15/21  Yes Jorge Vanegas, DO   clotrimazole-betamethasone (LOTRISONE) 1-0.05 % cream Apply topically 2 times daily. 10/28/19  Yes GORDO Santos CNP   Respiratory Therapy Supplies MISC CPAP Mask and Hose Size medium 9/16/19  Yes GORDO Santos CNP   blood glucose monitor kit and supplies Check daily 12/4/18  Yes GORDO Santos CNP   blood glucose monitor strips Check daily 12/4/18  Yes GORDO Santos CNP   Lancets MISC Check blood sugar daily 12/4/18  Yes GORDO Santos CNP        Allergies:       Sulfa antibiotics and Percocet [oxycodone-acetaminophen]    Social History:     Tobacco:    reports that she has never smoked. She has never used smokeless tobacco.  Alcohol:      reports no history of alcohol use. Drug Use:  reports no history of drug use. Family History:        Family History   Problem Relation Age of Onset    Cancer Father         lung       Review of Systems:         Review of Systems   Constitutional: Negative for chills and fever. Respiratory: Negative for cough and shortness of breath. Cardiovascular: Negative for chest pain and palpitations.    Gastrointestinal: Negative for diarrhea, nausea and vomiting. Neurological: Negative for dizziness, seizures and headaches. Physical Exam:     Vitals:  /80   Pulse 80   Temp 97.8 °F (36.6 °C) (Oral)   Wt 249 lb (112.9 kg)   SpO2 98%   BMI 45.54 kg/m²       Physical Exam  Vitals signs and nursing note reviewed. Constitutional:       Appearance: Normal appearance. She is well-developed. Cardiovascular:      Rate and Rhythm: Normal rate and regular rhythm. Heart sounds: Normal heart sounds, S1 normal and S2 normal.   Pulmonary:      Effort: Pulmonary effort is normal. No respiratory distress. Breath sounds: Normal breath sounds. Abdominal:      General: Bowel sounds are normal.      Palpations: Abdomen is soft. Tenderness: There is no abdominal tenderness. Skin:     General: Skin is warm and dry. Findings: Rash present. Rash is vesicular. Comments: Small cluster of dried vesicles with surrounding erythema   Neurological:      Mental Status: She is alert and oriented to person, place, and time. Data:     Lab Results   Component Value Date     10/15/2020    K 3.9 10/15/2020     10/15/2020    CO2 26 10/15/2020    BUN 17 10/15/2020    CREATININE 0.70 10/15/2020    GLUCOSE 111 10/15/2020    PROT 7.6 12/09/2018    LABALBU 3.9 12/09/2018    BILITOT 0.35 12/09/2018    ALKPHOS 94 12/09/2018    AST 21 12/09/2018    ALT 25 12/09/2018     Lab Results   Component Value Date    WBC 5.1 12/09/2018    RBC 4.53 12/09/2018    HGB 12.2 12/09/2018    HCT 36.8 12/09/2018    MCV 81.3 12/09/2018    MCH 27.0 12/09/2018    MCHC 33.2 12/09/2018    RDW 14.8 12/09/2018     12/09/2018    MPV NOT REPORTED 12/09/2018     Lab Results   Component Value Date    TSH 1.03 10/15/2020     Lab Results   Component Value Date    CHOL 191 10/15/2020    HDL 44 10/15/2020    LABA1C 6.4 02/15/2021          Assessment & Plan        Diagnosis Orders   1.  Herpes zoster without complication Will start pt on acyclovir ointment. Lesions are mostly dry at this point. Pt advised to avoid daughter due to her daughter being pregnant. Continue symptom control. Patient verbalizes understanding and agreement with plan. All questions answered. If symptoms do not resolve or worsen, return to office. Completed Refills   Requested Prescriptions     Signed Prescriptions Disp Refills    acyclovir (ZOVIRAX) 5 % ointment 15 g 0     Sig: Apply topically 5 times daily. No follow-ups on file. Orders Placed This Encounter   Medications    acyclovir (ZOVIRAX) 5 % ointment     Sig: Apply topically 5 times daily. Dispense:  15 g     Refill:  0     No orders of the defined types were placed in this encounter. Patient Instructions   SURVEY:    You may be receiving a survey from [employer] regarding your visit today. Please complete the survey to enable us to provide the highest quality of care to you and your family. If you cannot score us a very good (5 Stars) on any question, please call the office to discuss how we could have made your experience a very good one. Thank you. Clinical Care Team: REGINE Hampton LPN    Clerical Team: Naty Anderson        Electronically signed by GORDO Hampton CNP on 2/22/2021 at 11:45 AM           Completed Refills      Requested Prescriptions     Signed Prescriptions Disp Refills    acyclovir (ZOVIRAX) 5 % ointment 15 g 0     Sig: Apply topically 5 times daily. Kurtis Rockledge received counseling on the following healthy behaviors: medication adherence  Reviewed prior labs and health maintenance. Continue current medications, diet and exercise. Discussed use, benefit, and side effects of prescribed medications. Barriers to medication compliance addressed.    Patient given educational materials - see patient instructions. All patient questions answered. Patient voiced understanding.

## 2021-02-22 NOTE — PATIENT INSTRUCTIONS
SURVEY:    You may be receiving a survey from [employer] regarding your visit today. Please complete the survey to enable us to provide the highest quality of care to you and your family. If you cannot score us a very good (5 Stars) on any question, please call the office to discuss how we could have made your experience a very good one. Thank you.     Clinical Care Team: GORDO Hampton-CLAU Garcia LPN    Clerical Team: [address]                           Howard Dew

## 2021-04-14 ENCOUNTER — OFFICE VISIT (OUTPATIENT)
Dept: SURGERY | Age: 61
End: 2021-04-14
Payer: COMMERCIAL

## 2021-04-14 VITALS
BODY MASS INDEX: 45.27 KG/M2 | WEIGHT: 255.5 LBS | TEMPERATURE: 98 F | SYSTOLIC BLOOD PRESSURE: 135 MMHG | DIASTOLIC BLOOD PRESSURE: 86 MMHG | HEIGHT: 63 IN | HEART RATE: 92 BPM | RESPIRATION RATE: 18 BRPM

## 2021-04-14 DIAGNOSIS — Z12.11 ENCOUNTER FOR SCREENING COLONOSCOPY: Primary | ICD-10-CM

## 2021-04-14 PROCEDURE — 99203 OFFICE O/P NEW LOW 30 MIN: CPT | Performed by: SURGERY

## 2021-04-14 NOTE — PROGRESS NOTES
GENERAL SURGERY CONSULTATION      Patient's Name/ Date of Birth/ Gender: John Paul Leal / 1960 (61 y.o.) / female     PCP: GORDO Clements CNP  Referring:     History of present Illness:  Patient is a pleasant 61 y.o. female  kindly referred by GORDO Clements CNP   Denies melena or hematochezia. Denies family history of colon cancer in first degree relatives, denies family history inflammatory bowel disease. Denies significant changes in bowel habits. Denies fevers or chills. Denies unexpected weight loss. Denies abdominal pain. Last colonoscopy 10 years ago [place]. History of BRBPR when eats popcorn. No constipation. Occasional loose stools. Past Medical History:  has a past medical history of Arthritis, Diabetes mellitus ([address] Graves disease, Hypothyroidism, Morbid obesity with BMI of 45.0-49.9, adult ([address] Tachycardia, and Water retention. Past Surgical History:   Past Surgical History:   Procedure Laterality Date    APPENDECTOMY      BREAST LUMPECTOMY Left 2011    CHOLECYSTECTOMY      COLONOSCOPY  2011    OTHER SURGICAL HISTORY  1986    radioactive iodine thyroid ablation due to Grave's disease    AL TOTAL KNEE ARTHROPLASTY Left 9/11/2018    KNEE TOTAL ARTHROPLASTY performed by Macie Fernández MD at [address] [place] [address] Left 09/11/2018    Dr. Art Hudson       Social History:  reports that she has never smoked. She has never used smokeless tobacco. She reports that she does not drink alcohol or use drugs. Family History: family history includes Cancer in her father.     Review of Systems:   General: Completed and, except as mentioned above, was negative or noncontributory  Psychological:  Completed and, except as mentioned above, was negative or noncontributory  Ophthalmic:  Completed and, except as mentioned above, was negative or noncontributory  ENT:  Completed and, except as mentioned above, was negative or noncontributory  Allergy and Immunology: Completed and, except as mentioned above, was negative or noncontributory  Hematological and Lymphatic:  Completed and, except as mentioned above, was negative or noncontributory  Endocrine: Completed and, except as mentioned above, was negative or noncontributory  Breast:  Completed and, except as mentioned above, was negative or noncontributory  Respiratory:  Completed and, except as mentioned above, was negative or noncontributory  Cardiovascular:  Completed and, except as mentioned above, was negative or noncontributory  Gastrointestinal: Completed and, except as mentioned above, was negative or noncontributory  Genito-Urinary:  Completed and, except as mentioned above, was negative or noncontributory  Musculoskeletal:  Completed and, except as mentioned above, was negative or noncontributory  Neurological:  Completed and, except as mentioned above, was negative or noncontributory  Dermatological:  Completed and, except as mentioned above, was negative or noncontributory    Allergies: Sulfa antibiotics and Percocet [oxycodone-acetaminophen]    Current Meds:  Current Outpatient Medications:     levothyroxine (SYNTHROID) 200 MCG tablet, TAKE 1 TABLET DAILY, Disp: 90 tablet, Rfl: 3    metFORMIN (GLUCOPHAGE) 500 MG tablet, TAKE 2 TABLETS TWICE A DAY WITH MEALS, Disp: 180 tablet, Rfl: 3    furosemide (LASIX) 20 MG tablet, TAKE 1 TABLET DAILY, Disp: 90 tablet, Rfl: 3    meloxicam (MOBIC) 7.5 MG tablet, TAKE 1 TABLET DAILY, Disp: 90 tablet, Rfl: 3    dilTIAZem (CARTIA XT) 180 MG extended release capsule, Take 1 capsule by mouth daily, Disp: 90 capsule, Rfl: 1    Respiratory Therapy Supplies MISC, CPAP Mask and Hose Size medium, Disp: 1 each, Rfl: 0    blood glucose monitor kit and supplies, Check daily, Disp: 1 kit, Rfl: 0    blood glucose monitor strips, Check daily, Disp: 100 strip, Rfl: 1    Lancets MISC, Check blood sugar daily, Disp: 100 each, Rfl: 3    cyclobenzaprine (FLEXERIL) 10 MG tablet, TAKE 1 TABLET BY MOUTH EVERY 8 HOURS AS NEEDED FOR MUSCLE SPASM. DO NOT DRIVE. CAN CAUSE DROWSINESS, Disp: , Rfl:     clotrimazole-betamethasone (LOTRISONE) 1-0.05 % cream, Apply topically 2 times daily. (Patient not taking: Reported on 4/14/2021), Disp: 30 g, Rfl: 0    Physical Exam:  Vital signs and Nurse's note reviewed. /86   Pulse 92   Temp 98 °F (36.7 °C)   Resp 18   Ht 5' 3\" (1.6 m)   Wt 255 lb 8 oz (115.9 kg)   LMP  (LMP Unknown)   Breastfeeding No   BMI 45.26 kg/m²    height is 5' 3\" (1.6 m) and weight is 255 lb 8 oz (115.9 kg). Her temperature is 98 °F (36.7 °C). Her blood pressure is 135/86 and her pulse is 92. Her respiration is 18. Gen:  A&Ox3, NAD. Pleasant and cooperative.   HEENT: PERRLA, EOMI, no scleral icterus  Neck:  no goiter  CVS: Regular rate and rhythm  Resp: Good bilateral air entry, no active wheezing, no labored breathing  Abd: soft, non-tender, non-distended, rectal exam to be done at scope  Ext: Moves all extremities, no gross focal motor deficits  Skin: No erythema or ulcerations     Labs:   Lab Results   Component Value Date    WBC 5.1 12/09/2018    HGB 12.2 12/09/2018    HCT 36.8 12/09/2018    MCV 81.3 12/09/2018     12/09/2018     Lab Results   Component Value Date     10/15/2020    K 3.9 10/15/2020     10/15/2020    CO2 26 10/15/2020    BUN 17 10/15/2020    CREATININE 0.70 10/15/2020    GLUCOSE 111 10/15/2020    CALCIUM 9.1 10/15/2020     Lab Results   Component Value Date    ALKPHOS 94 12/09/2018    ALT 25 12/09/2018    AST 21 12/09/2018    PROT 7.6 12/09/2018    BILITOT 0.35 12/09/2018    LABALBU 3.9 12/09/2018     No results found for: AMYLASE  No results found for: LIPASE  Lab Results   Component Value Date    INR 1.0 08/09/2018       Radiologic Studies:  EXAMINATION:   ULTRASOUND OF THE KIDNEYS       12/21/2020 11:38 am       COMPARISON:   None.       HISTORY:   ORDERING SYSTEM PROVIDED HISTORY: Urinary frequency   TECHNOLOGIST PROVIDED HISTORY:

## 2021-06-24 ENCOUNTER — TELEPHONE (OUTPATIENT)
Dept: FAMILY MEDICINE CLINIC | Age: 61
End: 2021-06-24

## 2021-06-24 ENCOUNTER — OFFICE VISIT (OUTPATIENT)
Dept: FAMILY MEDICINE CLINIC | Age: 61
End: 2021-06-24
Payer: COMMERCIAL

## 2021-06-24 VITALS
HEIGHT: 63 IN | HEART RATE: 111 BPM | OXYGEN SATURATION: 97 % | SYSTOLIC BLOOD PRESSURE: 120 MMHG | TEMPERATURE: 99.8 F | WEIGHT: 247.4 LBS | DIASTOLIC BLOOD PRESSURE: 80 MMHG | BODY MASS INDEX: 43.84 KG/M2

## 2021-06-24 DIAGNOSIS — E11.9 TYPE 2 DIABETES MELLITUS WITHOUT COMPLICATION, WITHOUT LONG-TERM CURRENT USE OF INSULIN (HCC): Primary | ICD-10-CM

## 2021-06-24 PROCEDURE — 99213 OFFICE O/P EST LOW 20 MIN: CPT | Performed by: NURSE PRACTITIONER

## 2021-06-24 RX ORDER — GLIPIZIDE 5 MG/1
5 TABLET, FILM COATED, EXTENDED RELEASE ORAL DAILY
Qty: 90 TABLET | Refills: 3 | Status: CANCELLED | OUTPATIENT
Start: 2021-06-24

## 2021-06-24 RX ORDER — GLIPIZIDE 2.5 MG/1
TABLET, EXTENDED RELEASE ORAL
Qty: 90 TABLET | Refills: 0 | Status: SHIPPED | OUTPATIENT
Start: 2021-06-24 | End: 2021-09-16 | Stop reason: SDUPTHER

## 2021-06-24 SDOH — ECONOMIC STABILITY: FOOD INSECURITY: WITHIN THE PAST 12 MONTHS, THE FOOD YOU BOUGHT JUST DIDN'T LAST AND YOU DIDN'T HAVE MONEY TO GET MORE.: NEVER TRUE

## 2021-06-24 SDOH — ECONOMIC STABILITY: FOOD INSECURITY: WITHIN THE PAST 12 MONTHS, YOU WORRIED THAT YOUR FOOD WOULD RUN OUT BEFORE YOU GOT MONEY TO BUY MORE.: NEVER TRUE

## 2021-06-24 ASSESSMENT — ENCOUNTER SYMPTOMS
COUGH: 0
VOMITING: 0
SHORTNESS OF BREATH: 0
DIARRHEA: 0
NAUSEA: 0
BLURRED VISION: 0
VISUAL CHANGE: 0

## 2021-06-24 ASSESSMENT — SOCIAL DETERMINANTS OF HEALTH (SDOH): HOW HARD IS IT FOR YOU TO PAY FOR THE VERY BASICS LIKE FOOD, HOUSING, MEDICAL CARE, AND HEATING?: NOT HARD AT ALL

## 2021-06-24 NOTE — PROGRESS NOTES
HPI Notes    Name: Augustina Mora  : 1960         Chief Complaint:     Chief Complaint   Patient presents with    Diabetes     BS have been running 175 fasting        History of Present Illness:        Diabetes  She presents for her follow-up diabetic visit. She has type 2 diabetes mellitus. Her disease course has been stable. There are no hypoglycemic associated symptoms. Pertinent negatives for hypoglycemia include no dizziness, headaches or seizures. Pertinent negatives for diabetes include no blurred vision, no chest pain, no visual change and no weakness. Symptoms are stable. Risk factors for coronary artery disease include diabetes mellitus. Current diabetic treatment includes oral agent (monotherapy). She is compliant with treatment all of the time. She is following a generally healthy (eats a lot of fruit) diet. She participates in exercise intermittently. Her breakfast blood glucose is taken between 7-8 am. Her breakfast blood glucose range is generally 140-180 mg/dl.        Past Medical History:     Past Medical History:   Diagnosis Date    Arthritis     Diabetes mellitus ([facility]     Graves disease     s/p radioactive iodine due to tachycardia ablation followed by hypothyroidism    Hypothyroidism     Morbid obesity with BMI of 45.0-49.9, adult ([facility]     Tachycardia     Water retention       Reviewed all health maintenance requirements and ordered appropriate tests  Health Maintenance Due   Topic Date Due    Hepatitis C screen  Never done    Pneumococcal 0-64 years Vaccine (1 of 2 - PPSV23) Never done    HIV screen  Never done    DTaP/Tdap/Td vaccine (1 - Tdap) Never done    Shingles Vaccine (1 of 2) Never done    Diabetic foot exam  2021    Diabetic retinal exam  2021       Past Surgical History:     Past Surgical History:   Procedure Laterality Date    APPENDECTOMY      BREAST LUMPECTOMY Left     CHOLECYSTECTOMY      COLONOSCOPY      OTHER SURGICAL shortness of breath. Cardiovascular: Negative for chest pain and palpitations. Gastrointestinal: Negative for diarrhea, nausea and vomiting. Neurological: Negative for dizziness, seizures, weakness and headaches. Physical Exam:     Vitals:  /80 (Site: Left Upper Arm, Position: Sitting, Cuff Size: Large Adult)   Pulse 111   Temp 99.8 °F (37.7 °C)   Ht 5' 3\" (1.6 m)   Wt 247 lb 6.4 oz (112.2 kg)   LMP  (LMP Unknown)   SpO2 97%   BMI 43.82 kg/m²       Physical Exam  Vitals and nursing note reviewed. Constitutional:       Appearance: Normal appearance. She is well-developed. Cardiovascular:      Rate and Rhythm: Normal rate and regular rhythm. Heart sounds: Normal heart sounds, S1 normal and S2 normal.   Pulmonary:      Effort: Pulmonary effort is normal. No respiratory distress. Breath sounds: Normal breath sounds. Abdominal:      General: Bowel sounds are normal.      Palpations: Abdomen is soft. Tenderness: There is no abdominal tenderness. Skin:     General: Skin is warm and dry. Neurological:      Mental Status: She is alert and oriented to person, place, and time.                Data:     Lab Results   Component Value Date     10/15/2020    K 3.9 10/15/2020     10/15/2020    CO2 26 10/15/2020    BUN 17 10/15/2020    CREATININE 0.70 10/15/2020    GLUCOSE 111 10/15/2020    PROT 7.6 12/09/2018    LABALBU 3.9 12/09/2018    BILITOT 0.35 12/09/2018    ALKPHOS 94 12/09/2018    AST 21 12/09/2018    ALT 25 12/09/2018     Lab Results   Component Value Date    WBC 5.1 12/09/2018    RBC 4.53 12/09/2018    HGB 12.2 12/09/2018    HCT 36.8 12/09/2018    MCV 81.3 12/09/2018    MCH 27.0 12/09/2018    MCHC 33.2 12/09/2018    RDW 14.8 12/09/2018     12/09/2018    MPV NOT REPORTED 12/09/2018     Lab Results   Component Value Date    TSH 1.03 10/15/2020     Lab Results   Component Value Date    CHOL 191 10/15/2020    HDL 44 10/15/2020    LABA1C 6.4 02/15/2021 Assessment & Plan        Diagnosis Orders   1. Type 2 diabetes mellitus without complication, without long-term current use of insulin ([facility])       A1c=6.9% (previously 6.4%). pt controlled at this time but has increased. Pt counseled about diet control. Pt encouraged to read labels and look up carb content prior to eating a food. Continue metformin 1,000mg BID. Will start back on glipizide 2.5mg daily. Pt educated to eat with glipizide. Patient verbalizes understanding and agreement with plan. All questions answered. If symptoms do not resolve or worsen, return to office. Completed Refills   Requested Prescriptions     Signed Prescriptions Disp Refills    glipiZIDE (GLUCOTROL XL) 2.5 MG extended release tablet 90 tablet 0     Sig: TAKE 1 TABLET DAILY    metFORMIN (GLUCOPHAGE) 500 MG tablet 360 tablet 0     Sig: TAKE 2 TABLETS TWICE A DAY WITH MEALS     No follow-ups on file. Orders Placed This Encounter   Medications    glipiZIDE (GLUCOTROL XL) 2.5 MG extended release tablet     Sig: TAKE 1 TABLET DAILY     Dispense:  90 tablet     Refill:  0    metFORMIN (GLUCOPHAGE) 500 MG tablet     Sig: TAKE 2 TABLETS TWICE A DAY WITH MEALS     Dispense:  360 tablet     Refill:  0     FILE- PATIENT WILL CALL TO FILL     No orders of the defined types were placed in this encounter. There are no Patient Instructions on file for this visit. Electronically signed by GORDO Frausto CNP on 6/24/2021 at 5:19 PM           Completed Refills      Requested Prescriptions     Signed Prescriptions Disp Refills    glipiZIDE (GLUCOTROL XL) 2.5 MG extended release tablet 90 tablet 0     Sig: TAKE 1 TABLET DAILY    metFORMIN (GLUCOPHAGE) 500 MG tablet 360 tablet 0     Sig: TAKE 2 TABLETS TWICE A DAY WITH MEALS         Corry Quarles received counseling on the following healthy behaviors: nutrition and medication adherence  Reviewed prior labs and health maintenance.   Continue current medications, diet and exercise. Discussed use, benefit, and side effects of prescribed medications. Barriers to medication compliance addressed. Patient given educational materials - see patient instructions. All patient questions answered. Patient voiced understanding.

## 2021-06-24 NOTE — TELEPHONE ENCOUNTER
Anna Monteiro is wanting to know if she can go back on her glipizide. She said she can't get her blood sugars under 175. She was on glipizide 5 mg. Please let Anna Monteiro know. Dm-gonzales    Health Maintenance   Topic Date Due    Hepatitis C screen  Never done    Pneumococcal 0-64 years Vaccine (1 of 2 - PPSV23) Never done    HIV screen  Never done    DTaP/Tdap/Td vaccine (1 - Tdap) Never done    Shingles Vaccine (1 of 2) Never done    Diabetic foot exam  01/21/2021    Diabetic retinal exam  01/21/2021    Flu vaccine (Season Ended) 09/01/2021    Lipid screen  10/15/2021    TSH testing  10/15/2021    Potassium monitoring  10/15/2021    Creatinine monitoring  10/15/2021    Cervical cancer screen  12/11/2021    A1C test (Diabetic or Prediabetic)  02/15/2022    Diabetic microalbuminuria test  02/15/2022    Breast cancer screen  10/28/2022    Colon cancer screen colonoscopy  07/03/2023    COVID-19 Vaccine  Completed    Hepatitis A vaccine  Aged Out    Hib vaccine  Aged Out    Meningococcal (ACWY) vaccine  Aged Out             (applicable per patient's age: Cancer Screenings, Depression Screening, Fall Risk Screening, Immunizations)    Hemoglobin A1C (%)   Date Value   02/15/2021 6.4   01/21/2020 5.8   05/21/2019 6.0     LDL Cholesterol (mg/dL)   Date Value   12/09/2018 87     LDL Calculated (mg/dL)   Date Value   10/15/2020 108     AST (U/L)   Date Value   12/09/2018 21     ALT (U/L)   Date Value   12/09/2018 25     BUN (mg/dL)   Date Value   10/15/2020 17      (goal A1C is < 7)   (goal LDL is <100) need 30-50% reduction from baseline     BP Readings from Last 3 Encounters:   04/14/21 135/86   02/22/21 122/80   02/15/21 128/76    (goal /80)      All Future Testing planned in [facility]:  Lab Frequency Next Occurrence       Next Visit Date:  No future appointments.          Patient Active Problem List:     S/P knee surgery     Type 2 diabetes mellitus without complication, without long-term current use of insulin ([facility])     H/O sinus tachycardia     NASIR (obstructive sleep apnea)     Postablative hypothyroidism

## 2021-06-24 NOTE — LETTER
[address]  [address] [address]  Phone: [phone]  Fax: Chris Shen [phone], APRN - CNP        Dear Mirza Kiser:    30-45 grams of carbohydrates per meal  Anything white and fluffy is usually a carbohydrate  Read food labels, pay attention to serving size and carbohydrates  Eat consistently, don't go all day without eating  Eat breakfast, lunch, dinner (30-45 grams carbohydrate per meal)  Bedtime snack of 15g of carbohydrates    No NEVER foods:  Cereal  Oatmeal  [facility] for hypoglycemia (low blood sugar less than 70)    1. Eat 15gm carb snack and 15gm protein snack  2. Wait 15 minutes  3. Recheck blood sugar  4. If still below 70, repeat carb snack.     Examples of 15gm carb snack:    4oz orange juice  4oz cola  6 saltine crackers  3 kria crackers  1 slice of bread    Examples 15gm protein snack    2oz of chicken or turkey  2oz of salmon or tuna  4tbsp peanut butter

## 2021-07-14 ENCOUNTER — ANESTHESIA EVENT (OUTPATIENT)
Dept: OPERATING ROOM | Age: 61
End: 2021-07-14
Payer: COMMERCIAL

## 2021-07-14 PROCEDURE — 45378 DIAGNOSTIC COLONOSCOPY: CPT | Performed by: SURGERY

## 2021-07-15 ENCOUNTER — ANESTHESIA (OUTPATIENT)
Dept: OPERATING ROOM | Age: 61
End: 2021-07-15
Payer: COMMERCIAL

## 2021-07-15 ENCOUNTER — HOSPITAL ENCOUNTER (OUTPATIENT)
Age: 61
Setting detail: OUTPATIENT SURGERY
Discharge: HOME | End: 2021-07-15
Attending: SURGERY | Admitting: SURGERY
Payer: COMMERCIAL

## 2021-07-15 VITALS
HEIGHT: 63 IN | WEIGHT: 248 LBS | SYSTOLIC BLOOD PRESSURE: 133 MMHG | HEART RATE: 100 BPM | BODY MASS INDEX: 43.94 KG/M2 | DIASTOLIC BLOOD PRESSURE: 78 MMHG | OXYGEN SATURATION: 96 % | TEMPERATURE: 97.4 F | RESPIRATION RATE: 19 BRPM

## 2021-07-15 VITALS — TEMPERATURE: 97.1 F | OXYGEN SATURATION: 96 % | DIASTOLIC BLOOD PRESSURE: 72 MMHG | SYSTOLIC BLOOD PRESSURE: 121 MMHG

## 2021-07-15 LAB — GLUCOSE BLD-MCNC: 162 MG/DL (ref 74–100)

## 2021-07-15 PROCEDURE — 82947 ASSAY GLUCOSE BLOOD QUANT: CPT

## 2021-07-15 PROCEDURE — 2500000003 HC RX 250 WO HCPCS: Performed by: NURSE ANESTHETIST, CERTIFIED REGISTERED

## 2021-07-15 PROCEDURE — 3700000000 HC ANESTHESIA ATTENDED CARE: Performed by: SURGERY

## 2021-07-15 PROCEDURE — 7100000010 HC PHASE II RECOVERY - FIRST 15 MIN: Performed by: SURGERY

## 2021-07-15 PROCEDURE — 7100000011 HC PHASE II RECOVERY - ADDTL 15 MIN: Performed by: SURGERY

## 2021-07-15 PROCEDURE — 3609027000 HC COLONOSCOPY: Performed by: SURGERY

## 2021-07-15 PROCEDURE — 3700000001 HC ADD 15 MINUTES (ANESTHESIA): Performed by: SURGERY

## 2021-07-15 PROCEDURE — 6360000002 HC RX W HCPCS: Performed by: NURSE ANESTHETIST, CERTIFIED REGISTERED

## 2021-07-15 PROCEDURE — 2709999900 HC NON-CHARGEABLE SUPPLY: Performed by: SURGERY

## 2021-07-15 PROCEDURE — 2580000003 HC RX 258: Performed by: SURGERY

## 2021-07-15 RX ORDER — LIDOCAINE HYDROCHLORIDE 20 MG/ML
INJECTION, SOLUTION EPIDURAL; INFILTRATION; INTRACAUDAL; PERINEURAL PRN
Status: DISCONTINUED | OUTPATIENT
Start: 2021-07-15 | End: 2021-07-15 | Stop reason: SDUPTHER

## 2021-07-15 RX ORDER — PROPOFOL 10 MG/ML
INJECTION, EMULSION INTRAVENOUS CONTINUOUS PRN
Status: DISCONTINUED | OUTPATIENT
Start: 2021-07-15 | End: 2021-07-15 | Stop reason: SDUPTHER

## 2021-07-15 RX ORDER — SODIUM CHLORIDE, SODIUM LACTATE, POTASSIUM CHLORIDE, CALCIUM CHLORIDE 600; 310; 30; 20 MG/100ML; MG/100ML; MG/100ML; MG/100ML
INJECTION, SOLUTION INTRAVENOUS CONTINUOUS
Status: DISCONTINUED | OUTPATIENT
Start: 2021-07-15 | End: 2021-07-15 | Stop reason: HOSPADM

## 2021-07-15 RX ORDER — ACETAMINOPHEN 325 MG/1
650 TABLET ORAL ONCE
Status: DISCONTINUED | OUTPATIENT
Start: 2021-07-15 | End: 2021-07-15 | Stop reason: HOSPADM

## 2021-07-15 RX ADMIN — PROPOFOL 50 MG: 10 INJECTION, EMULSION INTRAVENOUS at 12:30

## 2021-07-15 RX ADMIN — LIDOCAINE HYDROCHLORIDE 80 MG: 20 INJECTION, SOLUTION EPIDURAL; INFILTRATION; INTRACAUDAL; PERINEURAL at 12:29

## 2021-07-15 RX ADMIN — SODIUM CHLORIDE, POTASSIUM CHLORIDE, SODIUM LACTATE AND CALCIUM CHLORIDE: 600; 310; 30; 20 INJECTION, SOLUTION INTRAVENOUS at 11:31

## 2021-07-15 RX ADMIN — PROPOFOL 30 MG: 10 INJECTION, EMULSION INTRAVENOUS at 12:33

## 2021-07-15 RX ADMIN — PROPOFOL 100 MCG/KG/MIN: 10 INJECTION, EMULSION INTRAVENOUS at 12:29

## 2021-07-15 ASSESSMENT — PAIN SCALES - GENERAL
PAINLEVEL_OUTOF10: 0
PAINLEVEL_OUTOF10: 0

## 2021-07-15 NOTE — OP NOTE
Operative Note        Patient: Nina Kaba  YOB: 1960  MRN: Alyssa Hadley, APRN - CNP      Date of Procedure: 7/15/2021    Pre-Op Diagnosis: screening colonoscopy    Post-Op Diagnosis: Same. Normal colon. External hemorrhoids. Procedure:    Colonoscopy to cecum      Surgeon(s):  Latosha Park DO      Anesthesia: Monitor Anesthesia Care    Estimated Blood Loss (mL): none    Complications: None    Specimens: none    Procedure details:    I do meet with the patient in preoperative holding area. Please see H&P for indications for the above named procedure. Patient was brought to the endoscopy suite and placed under monitored anesthesia. Patient was positioned in left lateral position. Time out called and procedure confirmed. The lubricated variable stiffness pediatric colonoscope was carefully passed under direct vision into the rectum, advanced through the sigmoid colon, transverse colon, ascending colon and the cecum. The ileocecal valve was well visualized. Additional findings:    Findings:     Cecum: normal cecal pouch. IC valve and appendiceal orifice well confirmed  Ascending: normal  Transverse: normal  Descending: normal  Sigmoid: normal  Rectum: normal  Rectal exam: extrnal soft circumferential hemorrhoids without bleeding or thrombosis  Bowel prep quality: excellent    At the distal 1/3 of the rectum, the scope was retroflexed and shows grade 1internal hemorrhoids. The patient tolerated the procedure well. The abdomen was soft after the procedure. I spoke with pt/family afterwards. Next colonoscopy 10 years.       Electronically signed by Latosha Park DO, FACOS, FACS on 7/15/2021 at 12:44 PM

## 2021-07-15 NOTE — H&P
GENERAL SURGERY CONSULTATION        Patient's Name/ Date of Birth/ Gender: Geovani Villalpando / 1960 (61 y.o.) / female      PCP: GORDO Maynard CNP  Referring:      History of present Illness:  Patient is a pleasant 61 y.o. female  kindly referred by GORDO Maynard CNP   Denies melena or hematochezia. Denies family history of colon cancer in first degree relatives, denies family history inflammatory bowel disease. Denies significant changes in bowel habits. Denies fevers or chills. Denies unexpected weight loss. Denies abdominal pain. Last colonoscopy 10 years ago [place]. History of BRBPR when eats popcorn. No constipation. Occasional loose stools.         Past Medical History:  has a past medical history of Arthritis, Diabetes mellitus ([address] Graves disease, Hypothyroidism, Morbid obesity with BMI of 45.0-49.9, adult ([address] Tachycardia, and Water retention.     Past Surgical History:   Past Surgical History         Past Surgical History:   Procedure Laterality Date    APPENDECTOMY        BREAST LUMPECTOMY Left 2011    CHOLECYSTECTOMY        COLONOSCOPY   2011    OTHER SURGICAL HISTORY   1986     radioactive iodine thyroid ablation due to Grave's disease    [place] TOTAL KNEE ARTHROPLASTY Left 9/11/2018     KNEE TOTAL ARTHROPLASTY performed by Nathan Raphael MD at [address] [place] [address] Left 09/11/2018     Dr. Nicci Claros            Social History:  reports that she has never smoked.  She has never used smokeless tobacco. She reports that she does not drink alcohol or use drugs.     Family History: family history includes Cancer in her father.     Review of Systems:   General: Completed and, except as mentioned above, was negative or noncontributory  Psychological:  Completed and, except as mentioned above, was negative or noncontributory  Ophthalmic:  Completed and, except as mentioned above, was negative or noncontributory  ENT:  Completed and, except as mentioned above, was negative or noncontributory  Allergy and Immunology:  Completed and, except as mentioned above, was negative or noncontributory  Hematological and Lymphatic:  Completed and, except as mentioned above, was negative or noncontributory  Endocrine: Completed and, except as mentioned above, was negative or noncontributory  Breast:  Completed and, except as mentioned above, was negative or noncontributory  Respiratory:  Completed and, except as mentioned above, was negative or noncontributory  Cardiovascular:  Completed and, except as mentioned above, was negative or noncontributory  Gastrointestinal: Completed and, except as mentioned above, was negative or noncontributory  Genito-Urinary:  Completed and, except as mentioned above, was negative or noncontributory  Musculoskeletal:  Completed and, except as mentioned above, was negative or noncontributory  Neurological:  Completed and, except as mentioned above, was negative or noncontributory  Dermatological:  Completed and, except as mentioned above, was negative or noncontributory     Allergies: Sulfa antibiotics and Percocet [oxycodone-acetaminophen]     Current Meds:  Current Medication   Current Outpatient Medications:     levothyroxine (SYNTHROID) 200 MCG tablet, TAKE 1 TABLET DAILY, Disp: 90 tablet, Rfl: 3    metFORMIN (GLUCOPHAGE) 500 MG tablet, TAKE 2 TABLETS TWICE A DAY WITH MEALS, Disp: 180 tablet, Rfl: 3    furosemide (LASIX) 20 MG tablet, TAKE 1 TABLET DAILY, Disp: 90 tablet, Rfl: 3    meloxicam (MOBIC) 7.5 MG tablet, TAKE 1 TABLET DAILY, Disp: 90 tablet, Rfl: 3    dilTIAZem (CARTIA XT) 180 MG extended release capsule, Take 1 capsule by mouth daily, Disp: 90 capsule, Rfl: 1    Respiratory Therapy Supplies MISC, CPAP Mask and Hose Size medium, Disp: 1 each, Rfl: 0    blood glucose monitor kit and supplies, Check daily, Disp: 1 kit, Rfl: 0    blood glucose monitor strips, Check daily, Disp: 100 strip, Rfl: 1    Lancets MISC, Check blood sugar daily, Disp: 100 each, Rfl: 3    cyclobenzaprine (FLEXERIL) 10 MG tablet, TAKE 1 TABLET BY MOUTH EVERY 8 HOURS AS NEEDED FOR MUSCLE SPASM. DO NOT DRIVE. CAN CAUSE DROWSINESS, Disp: , Rfl:     clotrimazole-betamethasone (LOTRISONE) 1-0.05 % cream, Apply topically 2 times daily. (Patient not taking: Reported on 4/14/2021), Disp: 30 g, Rfl: 0        Physical Exam:  Vital signs and Nurse's note reviewed. /86   Pulse 92   Temp 98 °F (36.7 °C)   Resp 18   Ht 5' 3\" (1.6 m)   Wt 255 lb 8 oz (115.9 kg)   LMP  (LMP Unknown)   Breastfeeding No   BMI 45.26 kg/m²    height is 5' 3\" (1.6 m) and weight is 255 lb 8 oz (115.9 kg). Her temperature is 98 °F (36.7 °C). Her blood pressure is 135/86 and her pulse is 92. Her respiration is 18. Gen:  A&Ox3, NAD. Pleasant and cooperative.   HEENT: PERRLA, EOMI, no scleral icterus  Neck:  no goiter  CVS: Regular rate and rhythm  Resp: Good bilateral air entry, no active wheezing, no labored breathing  Abd: soft, non-tender, non-distended, rectal exam to be done at scope  Ext: Moves all extremities, no gross focal motor deficits  Skin: No erythema or ulcerations      Labs:         Lab Results   Component Value Date     WBC 5.1 12/09/2018     HGB 12.2 12/09/2018     HCT 36.8 12/09/2018     MCV 81.3 12/09/2018      12/09/2018            Lab Results   Component Value Date      10/15/2020     K 3.9 10/15/2020      10/15/2020     CO2 26 10/15/2020     BUN 17 10/15/2020     CREATININE 0.70 10/15/2020     GLUCOSE 111 10/15/2020     CALCIUM 9.1 10/15/2020            Lab Results   Component Value Date     ALKPHOS 94 12/09/2018     ALT 25 12/09/2018     AST 21 12/09/2018     PROT 7.6 12/09/2018     BILITOT 0.35 12/09/2018     LABALBU 3.9 12/09/2018      No results found for: AMYLASE  No results found for: LIPASE        Lab Results   Component Value Date     INR 1.0 08/09/2018         Radiologic Studies:  EXAMINATION:   ULTRASOUND OF THE KIDNEYS       12/21/2020 11:38 am     COMPARISON:   None.       HISTORY:   ORDERING SYSTEM PROVIDED HISTORY: Urinary frequency   TECHNOLOGIST PROVIDED HISTORY:   concern for stone vs hydro       Right flank pain x1 week       FINDINGS:   The right kidney measures 10.9 cm in length and the left kidney measures 11   cm in length.       Kidneys demonstrate normal cortical echogenicity.  No hydronephrosis or   intrarenal stones.  No focal lesions.  Limited views of the bladder show   bilateral ureteral jets.           Impression   Unremarkable ultrasound of the kidneys.            Impressions/Recommendations:      Due for screening colonoscopy     Risks and benefits of colonoscopy have been discussed in detail with the patient. Risks of the procedure include bleeding, bowel perforation, possibly missing smaller lesions if the bowel prep is not adequate. Alternative studies include barium enema and virtual colonoscopy; however, if a lesion is seen, then one would still need to proceed with the gold standard colonoscopy to retrieve or biopsy the lesion. All the patient's questions are answered. Will proceed with colonoscopy under MAC.      H&P  General Surgery        Pt Name: Al Marin  MRN: [id number]  YOB: 1960  Date of evaluation: 7/15/2021      [x] I have examined the patient and reviewed the H&P/Consult completed, and there are no changes to the patient or plans. [] I have examined the patient and reviewed the H&P/Consult and have noted the following changes: The patient was counseled at length about the risks of mike Covid-19 during their perioperative period and any recovery window from their procedure. The patient was made aware that mike Covid-19  may worsen their prognosis for recovering from their procedure  and lend to a higher morbidity and/or mortality risk. All material risks, benefits, and reasonable alternatives including postponing the procedure were discussed.  The patient does wish to proceed with the procedure at this time.         Electronically signed by Catalina Hope DO  on 7/15/2021 at 11:17 AM

## 2021-07-15 NOTE — ANESTHESIA PRE PROCEDURE
Department of Anesthesiology  Preprocedure Note       Name:  Dennis Love   Age:  61 y.o.  :  1960                                          MRN:  [id number]         Date:  7/15/2021      Surgeon: Jarrod Lombardo):  Dianna Myers DO    Procedure: Procedure(s):  COLORECTAL CANCER SCREENING, NOT HIGH RISK    Medications prior to admission:   Prior to Admission medications    Medication Sig Start Date End Date Taking? Authorizing Provider   glipiZIDE (GLUCOTROL XL) 2.5 MG extended release tablet TAKE 1 TABLET DAILY 21  Yes GORDO Ayon CNP   metFORMIN (GLUCOPHAGE) 500 MG tablet TAKE 2 TABLETS TWICE A DAY WITH MEALS 21  Yes GORDO Ayon CNP   levothyroxine (SYNTHROID) 200 MCG tablet TAKE 1 TABLET DAILY 2/15/21  Yes Prakash Mandujano DO   furosemide (LASIX) 20 MG tablet TAKE 1 TABLET DAILY 2/15/21  Yes Prakash Mandujano DO   dilTIAZem (CARTIA XT) 180 MG extended release capsule Take 1 capsule by mouth daily 2/15/21  Yes Prakash Mandujano DO   meloxicam (MOBIC) 7.5 MG tablet TAKE 1 TABLET DAILY 2/15/21   Prakash Mandujano DO   Respiratory Therapy Supplies MISC CPAP Mask and Hose Size medium 19   GORDO Ayon CNP   blood glucose monitor kit and supplies Check daily 18   GORDO Ayon CNP   blood glucose monitor strips Check daily 18   GORDO Ayon CNP   Lancets MISC Check blood sugar daily 18   GORDO Ayon CNP       Current medications:    Current Facility-Administered Medications   Medication Dose Route Frequency Provider Last Rate Last Admin    lactated ringers infusion   Intravenous Continuous Yesenia I Nazemi,  mL/hr at 07/15/21 1131 New Bag at 07/15/21 1131    acetaminophen (TYLENOL) tablet 650 mg  650 mg Oral Once Yesenia I Nazemi, DO           Allergies:     Allergies   Allergen Reactions    Sulfa Antibiotics Swelling     Throat swells     Percocet [Oxycodone-Acetaminophen] Nausea And Vomiting       Problem List:    Patient Active Problem List   Diagnosis Code    S/P knee surgery Z98.890    Type 2 diabetes mellitus without complication, without long-term current use of insulin ([facility] E11.9    H/O sinus tachycardia Z86.79    NASIR (obstructive sleep apnea) G47.33    Postablative hypothyroidism E89.0       Past Medical History:        Diagnosis Date    Arthritis     Diabetes mellitus ([facility]     Graves disease 1986    s/p radioactive iodine due to tachycardia ablation followed by hypothyroidism    Hypothyroidism     Morbid obesity with BMI of 45.0-49.9, adult ([facility]     Tachycardia     Water retention        Past Surgical History:        Procedure Laterality Date    APPENDECTOMY      BREAST LUMPECTOMY Left 2011    CHOLECYSTECTOMY      COLONOSCOPY  2011    OTHER SURGICAL HISTORY  1986    radioactive iodine thyroid ablation due to Grave's disease    [place] TOTAL KNEE ARTHROPLASTY Left 9/11/2018    KNEE TOTAL ARTHROPLASTY performed by Digna Crump MD at [facility] Left 09/11/2018    Dr. Jhonatan Yang       Social History:    Social History     Tobacco Use    Smoking status: Never Smoker    Smokeless tobacco: Never Used   Substance Use Topics    Alcohol use:  No                                Counseling given: Not Answered      Vital Signs (Current):   Vitals:    07/01/21 1002 07/15/21 1124   BP:  (!) 151/74   Pulse:  110   Resp:  16   Temp:  36.3 °C (97.4 °F)   TempSrc:  Temporal   SpO2:  96%   Weight: 247 lb (112 kg) 248 lb (112.5 kg)   Height: 5' 3\" (1.6 m) 5' 3\" (1.6 m)                                              BP Readings from Last 3 Encounters:   07/15/21 (!) 151/74   06/24/21 120/80   04/14/21 135/86       NPO Status: Time of last liquid consumption: 0330                        Time of last solid consumption: 1730                        Date of last liquid consumption: 07/14/21                        Date of last solid food consumption: 07/13/21    BMI:   Wt Readings from Last 3 Encounters:   07/15/21 248 lb (112.5 kg)   06/24/21 247 lb 6.4 oz (112.2 kg)   04/14/21 255 lb 8 oz (115.9 kg)     Body mass index is 43.93 kg/m².     CBC:   Lab Results   Component Value Date    WBC 5.1 12/09/2018    RBC 4.53 12/09/2018    HGB 12.2 12/09/2018    HCT 36.8 12/09/2018    MCV 81.3 12/09/2018    RDW 14.8 12/09/2018     12/09/2018       CMP:   Lab Results   Component Value Date     10/15/2020    K 3.9 10/15/2020     10/15/2020    CO2 26 10/15/2020    BUN 17 10/15/2020    CREATININE 0.70 10/15/2020    GFRAA >60 12/09/2018    LABGLOM >60 10/15/2020    LABGLOM >60 12/09/2018    GLUCOSE 111 10/15/2020    PROT 7.6 12/09/2018    CALCIUM 9.1 10/15/2020    BILITOT 0.35 12/09/2018    ALKPHOS 94 12/09/2018    AST 21 12/09/2018    ALT 25 12/09/2018       POC Tests:   Recent Labs     07/15/21  1132   POCGLU 162*       Coags:   Lab Results   Component Value Date    PROTIME 10.6 08/09/2018    INR 1.0 08/09/2018    APTT 27.7 08/09/2018       HCG (If Applicable): No results found for: PREGTESTUR, PREGSERUM, HCG, HCGQUANT     ABGs: No results found for: PHART, PO2ART, TLR1MKK, MNW3TAK, BEART, T3LLEGOK     Type & Screen (If Applicable):  No results found for: LABABO, LABRH    Drug/Infectious Status (If Applicable):  No results found for: HIV, HEPCAB    COVID-19 Screening (If Applicable): No results found for: COVID19        Anesthesia Evaluation  Patient summary reviewed and Nursing notes reviewed  Airway: Mallampati: III  TM distance: >3 FB   Neck ROM: full  Mouth opening: > = 3 FB Dental: normal exam         Pulmonary:normal exam    (+) sleep apnea: on CPAP,                             Cardiovascular:Negative CV ROS                      Neuro/Psych:   Negative Neuro/Psych ROS              GI/Hepatic/Renal: Neg GI/Hepatic/Renal ROS            Endo/Other:    (+) DiabetesType II DM, well controlled, , hypothyroidism::., .                 Abdominal:             Vascular: negative vascular ROS.         Other Findings:             Anesthesia Plan      MAC     ASA 3       Induction: intravenous. Anesthetic plan and risks discussed with patient.                       GORDO Powell - VIVI   7/15/2021

## 2021-07-15 NOTE — ANESTHESIA POSTPROCEDURE EVALUATION
Department of Anesthesiology  Postprocedure Note    Patient: Britney Jones  MRN: [id number]  YOB: 1960  Date of evaluation: 7/15/2021  Time:  1:48 PM     Procedure Summary     Date: 07/15/21 Room / Location: [address] [place] [address]    Anesthesia Start: 1226 Anesthesia Stop: 0541    Procedure: [address] NOT HIGH RISK (N/A ) Diagnosis: (SCREENING)    Surgeons: Lin Covington DO Responsible Provider: GORDO Pichardo CRNA    Anesthesia Type: MAC ASA Status: 3          Anesthesia Type: MAC    Janny Phase I: Janny Score: 10    Janny Phase II: Janny Score: 10    Last vitals: Reviewed and per EMR flowsheets.        Anesthesia Post Evaluation    Patient location during evaluation: PACU  Patient participation: complete - patient participated  Level of consciousness: awake and alert  Pain score: 0  Airway patency: patent  Nausea & Vomiting: no vomiting and no nausea  Complications: no  Cardiovascular status: blood pressure returned to baseline  Respiratory status: acceptable  Hydration status: stable

## 2021-07-15 NOTE — BRIEF OP NOTE
Brief Postoperative Note      Patient: Al Marin  YOB: 1960  MRN: [id number] Maki, APRN - CNP      Date of Procedure: 7/15/2021    Pre-Op Diagnosis: screening colonoscopy    Post-Op Diagnosis: Same. Normal colon. External hemorrhoids.        Procedure:    Colonoscopy to cecum      Surgeon(s):  Tera Garzon DO      Anesthesia: Monitor Anesthesia Care    Estimated Blood Loss (mL): none    Complications: None    Specimens: none        Electronically signed by Tera Garzon DO, FACOS, FACS on 7/15/2021 at 12:44 PM

## 2021-09-15 NOTE — TELEPHONE ENCOUNTER
long-term current use of insulin (HCC)     H/O sinus tachycardia     NASIR (obstructive sleep apnea)     Postablative hypothyroidism

## 2021-09-16 RX ORDER — GLIPIZIDE 2.5 MG/1
2.5 TABLET, EXTENDED RELEASE ORAL DAILY
Qty: 30 TABLET | Refills: 0 | Status: SHIPPED | OUTPATIENT
Start: 2021-09-16 | End: 2021-09-28 | Stop reason: SDUPTHER

## 2021-09-28 ENCOUNTER — OFFICE VISIT (OUTPATIENT)
Dept: FAMILY MEDICINE CLINIC | Age: 61
End: 2021-09-28
Payer: COMMERCIAL

## 2021-09-28 VITALS
WEIGHT: 248 LBS | TEMPERATURE: 97.9 F | BODY MASS INDEX: 43.93 KG/M2 | DIASTOLIC BLOOD PRESSURE: 74 MMHG | HEART RATE: 80 BPM | OXYGEN SATURATION: 97 % | SYSTOLIC BLOOD PRESSURE: 112 MMHG

## 2021-09-28 DIAGNOSIS — E11.9 TYPE 2 DIABETES MELLITUS WITHOUT COMPLICATION, WITHOUT LONG-TERM CURRENT USE OF INSULIN (HCC): Primary | ICD-10-CM

## 2021-09-28 LAB — HBA1C MFR BLD: 6.5 %

## 2021-09-28 PROCEDURE — 99213 OFFICE O/P EST LOW 20 MIN: CPT | Performed by: NURSE PRACTITIONER

## 2021-09-28 PROCEDURE — 83036 HEMOGLOBIN GLYCOSYLATED A1C: CPT | Performed by: NURSE PRACTITIONER

## 2021-09-28 RX ORDER — GLIPIZIDE 2.5 MG/1
2.5 TABLET, EXTENDED RELEASE ORAL DAILY
Qty: 90 TABLET | Refills: 0 | Status: SHIPPED | OUTPATIENT
Start: 2021-09-28 | End: 2022-01-04 | Stop reason: ALTCHOICE

## 2021-09-28 ASSESSMENT — ENCOUNTER SYMPTOMS
NAUSEA: 0
VOMITING: 0
COUGH: 0
SHORTNESS OF BREATH: 0
DIARRHEA: 0

## 2021-09-28 NOTE — PATIENT INSTRUCTIONS
SURVEY:    You may be receiving a survey from [employer] regarding your visit today. Please complete the survey to enable us to provide the highest quality of care to you and your family. If you cannot score us a very good (5 Stars) on any question, please call the office to discuss how we could have made your experience a very good one. Thank you.     Clinical Care Team: GORDO Ayon-CLAU Urbina LPN    Clerical Team: Lorie Hays

## 2021-09-28 NOTE — PROGRESS NOTES
HPI Notes    Name: Duke Hussein  : 1960         Chief Complaint:     Chief Complaint   Patient presents with    Diabetes Mellitus     Patient here for 3 month check up for DM. Last A1C was 6.9 in . Patient said she decreased her metformin to one twice a day about 2 weeks ago, she was taking 2 tabs bid. History of Present Illness:        Diabetes  She presents for her follow-up diabetic visit. She has type 2 diabetes mellitus. Her disease course has been improving. There are no hypoglycemic associated symptoms. Pertinent negatives for hypoglycemia include no dizziness, headaches or seizures. Pertinent negatives for diabetes include no chest pain. Symptoms are stable. Risk factors for coronary artery disease include obesity, post-menopausal and diabetes mellitus. Current diabetic treatment includes diet and oral agent (dual therapy). She is compliant with treatment all of the time. She is following a generally healthy diet. Her breakfast blood glucose is taken between 7-8 am. Her breakfast blood glucose range is generally 130-140 mg/dl. pt was having diarrhea and abd pain from metformin. She decreased her dose to 500mg 1 tab BID and symptoms resolved.     Past Medical History:     Past Medical History:   Diagnosis Date    Arthritis     Diabetes mellitus ([address]     Graves disease     s/p radioactive iodine due to tachycardia ablation followed by hypothyroidism    Hypothyroidism     Morbid obesity with BMI of 45.0-49.9, adult ([address]     Tachycardia     Water retention       Reviewed all health maintenance requirements and ordered appropriate tests  Health Maintenance Due   Topic Date Due    Hepatitis C screen  Never done    Pneumococcal 0-64 years Vaccine (1 of 2 - PPSV23) Never done    HIV screen  Never done    DTaP/Tdap/Td vaccine (1 - Tdap) Never done    Shingles Vaccine (1 of 2) Never done    Diabetic retinal exam  2021    Flu vaccine (1) Never done       Past Surgical History:     Past Surgical History:   Procedure Laterality Date    APPENDECTOMY      BREAST LUMPECTOMY Left 2011    CHOLECYSTECTOMY      COLONOSCOPY  2011    COLONOSCOPY  07/15/2021    COLONOSCOPY N/A 7/15/2021    COLORECTAL CANCER SCREENING, NOT HIGH RISK performed by Vandana Israel DO at [address]    radioactive iodine thyroid ablation due to Grave's disease    [place] TOTAL KNEE ARTHROPLASTY Left 9/11/2018    KNEE TOTAL ARTHROPLASTY performed by Amy Kennedy MD at [address] [facility] Left 09/11/2018    Dr. Shahnaz Greenberg        Medications:       Prior to Admission medications    Medication Sig Start Date End Date Taking? Authorizing Provider   glipiZIDE (GLUCOTROL XL) 2.5 MG extended release tablet Take 1 tablet by mouth daily TAKE 1 TABLET DAILY 9/28/21  Yes GORDO Chance CNP   metFORMIN (GLUCOPHAGE) 500 MG tablet TAKE 2 TABLETS TWICE A DAY WITH MEALS 6/24/21  Yes GORDO Chance CNP   levothyroxine (SYNTHROID) 200 MCG tablet TAKE 1 TABLET DAILY 2/15/21  Yes Narciso Kayser, DO   furosemide (LASIX) 20 MG tablet TAKE 1 TABLET DAILY 2/15/21  Yes Narciso Kayser, DO   meloxicam (MOBIC) 7.5 MG tablet TAKE 1 TABLET DAILY 2/15/21  Yes Narciso Kayser, DO   dilTIAZem (CARTIA XT) 180 MG extended release capsule Take 1 capsule by mouth daily 2/15/21  Yes Narciso Kayser, DO   Respiratory Therapy Supplies MISC CPAP Mask and Hose Size medium 9/16/19  Yes GORDO Chance CNP   blood glucose monitor kit and supplies Check daily 12/4/18  Yes GORDO Chance CNP   blood glucose monitor strips Check daily 12/4/18  Yes GORDO Chance CNP   Lancets MISC Check blood sugar daily 12/4/18  Yes GORDO Chance CNP        Allergies:       Sulfa antibiotics and Percocet [oxycodone-acetaminophen]    Social History:     Tobacco:    reports that she has never smoked.  She has never used smokeless tobacco.  Alcohol:      reports no history of alcohol use. Drug Use:  reports no history of drug use. Family History:        Family History   Problem Relation Age of Onset    Cancer Father         lung       Review of Systems:         Review of Systems   Constitutional: Negative for chills and fever. Respiratory: Negative for cough and shortness of breath. Cardiovascular: Negative for chest pain and palpitations. Gastrointestinal: Negative for diarrhea, nausea and vomiting. Neurological: Negative for dizziness, seizures and headaches. Physical Exam:     Vitals:  /74   Pulse 80   Temp 97.9 °F (36.6 °C) (Oral)   Wt 248 lb (112.5 kg)   LMP  (LMP Unknown)   SpO2 97%   BMI 43.93 kg/m²       Physical Exam  Vitals and nursing note reviewed. Constitutional:       Appearance: Normal appearance. She is well-developed. Cardiovascular:      Rate and Rhythm: Normal rate and regular rhythm. Heart sounds: Normal heart sounds, S1 normal and S2 normal.   Pulmonary:      Effort: Pulmonary effort is normal. No respiratory distress. Breath sounds: Normal breath sounds. Abdominal:      General: Bowel sounds are normal.      Palpations: Abdomen is soft. Tenderness: There is no abdominal tenderness. Skin:     General: Skin is warm and dry. Neurological:      Mental Status: She is alert and oriented to person, place, and time.                Data:     Lab Results   Component Value Date     10/15/2020    K 3.9 10/15/2020     10/15/2020    CO2 26 10/15/2020    BUN 17 10/15/2020    CREATININE 0.70 10/15/2020    GLUCOSE 111 10/15/2020    PROT 7.6 12/09/2018    LABALBU 3.9 12/09/2018    BILITOT 0.35 12/09/2018    ALKPHOS 94 12/09/2018    AST 21 12/09/2018    ALT 25 12/09/2018     Lab Results   Component Value Date    WBC 5.1 12/09/2018    RBC 4.53 12/09/2018    HGB 12.2 12/09/2018    HCT 36.8 12/09/2018    MCV 81.3 12/09/2018    MCH 27.0 12/09/2018    MCHC 33.2 12/09/2018    RDW 14.8 12/09/2018     12/09/2018    MPV NOT REPORTED 12/09/2018     Lab Results   Component Value Date    TSH 1.03 10/15/2020     Lab Results   Component Value Date    CHOL 191 10/15/2020    HDL 44 10/15/2020    LABA1C 6.4 02/15/2021          Assessment & Plan        Diagnosis Orders   1. Type 2 diabetes mellitus without complication, without long-term current use of insulin (HCC)  POCT glycosylated hemoglobin (Hb A1C)     DIABETES FOOT EXAM     A1c=6.5% (previously 6.9%). Continue metformin 500mg 1 tab BID and glipizide 2.5mg 1 tab daily. Continue diet control. Patient verbalizes understanding and agreement with plan. All questions answered. If symptoms do not resolve or worsen, return to office. Completed Refills   Requested Prescriptions     Signed Prescriptions Disp Refills    glipiZIDE (GLUCOTROL XL) 2.5 MG extended release tablet 90 tablet 0     Sig: Take 1 tablet by mouth daily TAKE 1 TABLET DAILY     No follow-ups on file. Orders Placed This Encounter   Medications    glipiZIDE (GLUCOTROL XL) 2.5 MG extended release tablet     Sig: Take 1 tablet by mouth daily TAKE 1 TABLET DAILY     Dispense:  90 tablet     Refill:  0     Orders Placed This Encounter   Procedures    POCT glycosylated hemoglobin (Hb A1C)     DIABETES FOOT EXAM         Patient Instructions   SURVEY:    You may be receiving a survey from [employer] regarding your visit today. Please complete the survey to enable us to provide the highest quality of care to you and your family. If you cannot score us a very good (5 Stars) on any question, please call the office to discuss how we could have made your experience a very good one. Thank you.     Clinical Care Team: REGINE Hermosillo LPN    Clerical Team: Agata Cardenas Organ        Electronically signed by GORDO Hermosillo CNP on 9/28/2021 at 6:33 AM Completed Refills      Requested Prescriptions     Signed Prescriptions Disp Refills    glipiZIDE (GLUCOTROL XL) 2.5 MG extended release tablet 90 tablet 0     Sig: Take 1 tablet by mouth daily TAKE 1 TABLET DAILY         Deshawn Rangel received counseling on the following healthy behaviors: nutrition, exercise and medication adherence  Reviewed prior labs and health maintenance. Continue current medications, diet and exercise. Discussed use, benefit, and side effects of prescribed medications. Barriers to medication compliance addressed. Patient given educational materials - see patient instructions. All patient questions answered. Patient voiced understanding.

## 2021-10-18 RX ORDER — DILTIAZEM HYDROCHLORIDE 180 MG/1
CAPSULE, COATED, EXTENDED RELEASE ORAL
Qty: 90 CAPSULE | Refills: 1 | Status: SHIPPED | OUTPATIENT
Start: 2021-10-18 | End: 2022-04-11

## 2021-10-18 NOTE — TELEPHONE ENCOUNTER
Last visit:  9/28/2021  Next Visit Date:    Future Appointments   Date Time Provider Kimberley Cohen   1/4/2022  6:00 AM Caren Reagin, APRN - CNP Song [facility]         Medication List:  Prior to Admission medications    Medication Sig Start Date End Date Taking?  Authorizing Provider   glipiZIDE (GLUCOTROL XL) 2.5 MG extended release tablet Take 1 tablet by mouth daily TAKE 1 TABLET DAILY 9/28/21   GORDO Oconnell CNP   metFORMIN (GLUCOPHAGE) 500 MG tablet TAKE 2 TABLETS TWICE A DAY WITH MEALS 6/24/21   GORDO Oconnell CNP   levothyroxine (SYNTHROID) 200 MCG tablet TAKE 1 TABLET DAILY 2/15/21   Verlon Sprinkles, DO   furosemide (LASIX) 20 MG tablet TAKE 1 TABLET DAILY 2/15/21   Verlon Sprinkles, DO   meloxicam (MOBIC) 7.5 MG tablet TAKE 1 TABLET DAILY 2/15/21   Verlon Sprraminles, DO   dilTIAZem (CARTIA XT) 180 MG extended release capsule Take 1 capsule by mouth daily 2/15/21   Verlon Sprinkles, DO   Respiratory Therapy Supplies MISC CPAP Mask and Hose Size medium 9/16/19   GORDO Oconnell CNP   blood glucose monitor kit and supplies Check daily 12/4/18   GORDO Oconnell CNP   blood glucose monitor strips Check daily 12/4/18   GORDO Oconnell CNP   Lancets MISC Check blood sugar daily 12/4/18   GORDO Oconnell CNP

## 2021-11-10 ENCOUNTER — HOSPITAL ENCOUNTER (OUTPATIENT)
Age: 61
Discharge: HOME | End: 2021-11-10
Payer: COMMERCIAL

## 2021-11-10 LAB
ABSOLUTE EOS #: 0.2 K/UL (ref 0–0.4)
ABSOLUTE IMMATURE GRANULOCYTE: ABNORMAL K/UL (ref 0–0.3)
ABSOLUTE LYMPH #: 3.1 K/UL (ref 1–4.8)
ABSOLUTE MONO #: 0.4 K/UL (ref 0–1)
ANION GAP SERPL CALCULATED.3IONS-SCNC: 13 MMOL/L (ref 9–17)
BASOPHILS # BLD: 1 % (ref 0–2)
BASOPHILS ABSOLUTE: 0 K/UL (ref 0–0.2)
BUN BLDV-MCNC: 20 MG/DL (ref 8–23)
BUN/CREAT BLD: 22 (ref 9–20)
CALCIUM SERPL-MCNC: 9.9 MG/DL (ref 8.6–10.4)
CHLORIDE BLD-SCNC: 97 MMOL/L (ref 98–107)
CO2: 28 MMOL/L (ref 20–31)
CREAT SERPL-MCNC: 0.92 MG/DL (ref 0.5–0.9)
DIFFERENTIAL TYPE: YES
EOSINOPHILS RELATIVE PERCENT: 2 % (ref 0–5)
GFR AFRICAN AMERICAN: >60 ML/MIN
GFR NON-AFRICAN AMERICAN: >60 ML/MIN
GFR SERPL CREATININE-BSD FRML MDRD: ABNORMAL ML/MIN/{1.73_M2}
GFR SERPL CREATININE-BSD FRML MDRD: ABNORMAL ML/MIN/{1.73_M2}
GLUCOSE BLD-MCNC: 89 MG/DL (ref 70–99)
HCT VFR BLD CALC: 41.4 % (ref 36–46)
HEMOGLOBIN: 14.1 G/DL (ref 12–16)
IMMATURE GRANULOCYTES: ABNORMAL %
LYMPHOCYTES # BLD: 41 % (ref 15–40)
MCH RBC QN AUTO: 29.7 PG (ref 26–34)
MCHC RBC AUTO-ENTMCNC: 34 G/DL (ref 31–37)
MCV RBC AUTO: 87.5 FL (ref 80–100)
MONOCYTES # BLD: 5 % (ref 4–8)
NRBC AUTOMATED: ABNORMAL PER 100 WBC
PDW BLD-RTO: 14.3 % (ref 12.1–15.2)
PLATELET # BLD: 257 K/UL (ref 140–450)
PLATELET ESTIMATE: ABNORMAL
PMV BLD AUTO: ABNORMAL FL (ref 6–12)
POTASSIUM SERPL-SCNC: 4 MMOL/L (ref 3.7–5.3)
RBC # BLD: 4.73 M/UL (ref 4–5.2)
RBC # BLD: ABNORMAL 10*6/UL
SEG NEUTROPHILS: 51 % (ref 47–75)
SEGMENTED NEUTROPHILS ABSOLUTE COUNT: 3.9 K/UL (ref 2.5–7)
SODIUM BLD-SCNC: 138 MMOL/L (ref 135–144)
WBC # BLD: 7.7 K/UL (ref 3.5–11)
WBC # BLD: ABNORMAL 10*3/UL

## 2021-11-10 PROCEDURE — 36415 COLL VENOUS BLD VENIPUNCTURE: CPT

## 2021-11-10 PROCEDURE — 85025 COMPLETE CBC W/AUTO DIFF WBC: CPT

## 2021-11-10 PROCEDURE — 80048 BASIC METABOLIC PNL TOTAL CA: CPT

## 2021-11-10 PROCEDURE — 93005 ELECTROCARDIOGRAM TRACING: CPT | Performed by: NURSE PRACTITIONER

## 2021-11-10 PROCEDURE — 87641 MR-STAPH DNA AMP PROBE: CPT

## 2021-11-10 PROCEDURE — 83036 HEMOGLOBIN GLYCOSYLATED A1C: CPT

## 2021-11-11 LAB
EKG ATRIAL RATE: 87 BPM
EKG P AXIS: 55 DEGREES
EKG P-R INTERVAL: 186 MS
EKG Q-T INTERVAL: 382 MS
EKG QRS DURATION: 92 MS
EKG QTC CALCULATION (BAZETT): 459 MS
EKG R AXIS: 64 DEGREES
EKG T AXIS: 59 DEGREES
EKG VENTRICULAR RATE: 87 BPM
ESTIMATED AVERAGE GLUCOSE: 105 MG/DL
HBA1C MFR BLD: 5.3 % (ref 4–6)
MRSA, DNA, NASAL: NORMAL
SPECIMEN DESCRIPTION: NORMAL

## 2021-11-11 PROCEDURE — 93010 ELECTROCARDIOGRAM REPORT: CPT | Performed by: INTERNAL MEDICINE

## 2021-11-16 ENCOUNTER — OFFICE VISIT (OUTPATIENT)
Dept: FAMILY MEDICINE CLINIC | Age: 61
End: 2021-11-16
Payer: COMMERCIAL

## 2021-11-16 VITALS
DIASTOLIC BLOOD PRESSURE: 72 MMHG | TEMPERATURE: 98.3 F | WEIGHT: 227 LBS | BODY MASS INDEX: 40.21 KG/M2 | OXYGEN SATURATION: 98 % | SYSTOLIC BLOOD PRESSURE: 108 MMHG | HEART RATE: 88 BPM

## 2021-11-16 DIAGNOSIS — Z01.818 PREOP EXAMINATION: Primary | ICD-10-CM

## 2021-11-16 PROCEDURE — 99213 OFFICE O/P EST LOW 20 MIN: CPT | Performed by: NURSE PRACTITIONER

## 2021-11-16 RX ORDER — GABAPENTIN 300 MG/1
CAPSULE ORAL
COMMUNITY
Start: 2021-11-12 | End: 2022-01-04 | Stop reason: ALTCHOICE

## 2021-11-16 RX ORDER — ASPIRIN 325 MG
TABLET ORAL
COMMUNITY
Start: 2021-11-12 | End: 2022-01-04 | Stop reason: ALTCHOICE

## 2021-11-16 RX ORDER — TRAMADOL HYDROCHLORIDE 50 MG/1
TABLET ORAL
COMMUNITY
Start: 2021-11-12 | End: 2022-01-04 | Stop reason: ALTCHOICE

## 2021-11-16 ASSESSMENT — ENCOUNTER SYMPTOMS
VOMITING: 0
NAUSEA: 0
SHORTNESS OF BREATH: 0
COUGH: 0
DIARRHEA: 0

## 2021-11-16 NOTE — PATIENT INSTRUCTIONS
SURVEY:    You may be receiving a survey from [employer] regarding your visit today. Please complete the survey to enable us to provide the highest quality of care to you and your family. If you cannot score us a very good (5 Stars) on any question, please call the office to discuss how we could have made your experience a very good one. Thank you.     Clinical Care Team: GORDO Campbell-CLAU Treadwell LPN    Clerical Team: Ericka Reyez Statement Selected

## 2021-11-16 NOTE — LETTER
[facility]  [address] [place] [id number]  Phone: [phone]  Fax: Chris Shen [phone], APRN - CNP        November 16, 2021    [address] [facility] ([place], [place])      Dear Dr Richard Burton,    Physical exam completed prior to surgical procedure. Pt medical history, current medications, labs, EKG, CXR reviewed. Pt is acceptable risk for surgical procedure. As with any surgery, there are risks and ultimately it is the decision of the surgeon to proceed with the surgery or not. Medical clearance form completed and faxed to surgeon office. If you have any questions or concerns, please don't hesitate to call.     Sincerely,          Nancie Donald, GORDO - CNP

## 2021-11-16 NOTE — PROGRESS NOTES
HPI Notes    Name: Arianna Flynn  : 1960         Chief Complaint:     Chief Complaint   Patient presents with    Pre-op Exam     Patient here today for surgery clearance. Will be having right total knee replacement with  on 21. History of Present Illness:        HPI  Pt is a 61yo female who reports for pre-op exam. Pt is scheduled to have a right knee replacement on 21 with Dr Zac Booker.      Past Medical History:     Past Medical History:   Diagnosis Date    Arthritis     Diabetes mellitus ([facility]     Graves disease     s/p radioactive iodine due to tachycardia ablation followed by hypothyroidism    Hypothyroidism     Morbid obesity with BMI of 45.0-49.9, adult ([facility]     Tachycardia     Water retention       Reviewed all health maintenance requirements and ordered appropriate tests  Health Maintenance Due   Topic Date Due    Hepatitis C screen  Never done    Pneumococcal 0-64 years Vaccine (1 of 2 - PPSV23) Never done    HIV screen  Never done    DTaP/Tdap/Td vaccine (1 - Tdap) Never done    Shingles Vaccine (1 of 2) Never done    Diabetic retinal exam  2021    Flu vaccine (1) Never done    COVID-19 Vaccine (3 - Booster for Pfizer series) 10/08/2021    Lipid screen  10/15/2021    TSH testing  10/15/2021    Cervical cancer screen  2021       Past Surgical History:     Past Surgical History:   Procedure Laterality Date    APPENDECTOMY      BREAST LUMPECTOMY Left     CHOLECYSTECTOMY      COLONOSCOPY      COLONOSCOPY  07/15/2021    COLONOSCOPY N/A 7/15/2021    COLORECTAL CANCER SCREENING, NOT HIGH RISK performed by Horace Dye DO at [address]    radioactive iodine thyroid ablation due to Grave's disease    [place] TOTAL KNEE ARTHROPLASTY Left 2018    KNEE TOTAL ARTHROPLASTY performed by Jennifer Riley MD at [address] [facility] Left 2018    Dr. Zac Booker        Medications:       Prior to Admission medications    Medication Sig Start Date End Date Taking? Authorizing Provider   dilTIAZem (CARDIZEM CD) 180 MG extended release capsule TAKE 1 CAPSULE BY MOUTH EVERY DAY 10/18/21  Yes Collette Wood DO   glipiZIDE (GLUCOTROL XL) 2.5 MG extended release tablet Take 1 tablet by mouth daily TAKE 1 TABLET DAILY 9/28/21  Yes GORDO Saez CNP   metFORMIN (GLUCOPHAGE) 500 MG tablet TAKE 2 TABLETS TWICE A DAY WITH MEALS 6/24/21  Yes GORDO Saez CNP   levothyroxine (SYNTHROID) 200 MCG tablet TAKE 1 TABLET DAILY 2/15/21  Yes Collette Wood DO   furosemide (LASIX) 20 MG tablet TAKE 1 TABLET DAILY 2/15/21  Yes Collette Wood DO   meloxicam (MOBIC) 7.5 MG tablet TAKE 1 TABLET DAILY 2/15/21  Yes Collette Wood DO   Respiratory Therapy Supplies MISC CPAP Mask and Hose Size medium 9/16/19  Yes GORDO Saez CNP   blood glucose monitor kit and supplies Check daily 12/4/18  Yes GORDO Saez CNP   blood glucose monitor strips Check daily 12/4/18  Yes GORDO Saez CNP   Lancets MISC Check blood sugar daily 12/4/18  Yes GORDO Saez CNP   gabapentin (NEURONTIN) 300 MG capsule TAKE 1 CAPSULE BY MOUTH once 1 (ONE) day before surgery, then TAKE 1 CAPSULE TWICE DAILY FOR 7 DAYS  Patient not taking: Reported on 11/16/2021 11/12/21   Historical Provider, MD   traMADol (ULTRAM) 50 MG tablet TAKE 1 TABLET BY MOUTH EVERY 4 TO 6 HOURS AS NEEDED FOR PAIN  Patient not taking: Reported on 11/16/2021 11/12/21   Historical Provider, MD   aspirin 325 MG tablet TAKE 1 TABLET BY MOUTH once a day  Patient not taking: Reported on 11/16/2021 11/12/21   Historical Provider, MD        Allergies:       Sulfa antibiotics and Percocet [oxycodone-acetaminophen]    Social History:     Tobacco:    reports that she has never smoked. She has never used smokeless tobacco.  Alcohol:      reports no history of alcohol use.   Drug Use:  reports no history of drug use.    Family History:        Family History   Problem Relation Age of Onset    Cancer Father         lung       Review of Systems:         Review of Systems   Constitutional: Negative for chills and fever. Respiratory: Negative for cough and shortness of breath. Cardiovascular: Negative for chest pain and palpitations. Gastrointestinal: Negative for diarrhea, nausea and vomiting. Neurological: Negative for dizziness, seizures and headaches. Physical Exam:     Vitals:  /72   Pulse 88   Temp 98.3 °F (36.8 °C) (Oral)   Wt 227 lb (103 kg)   LMP  (LMP Unknown)   SpO2 98%   BMI 40.21 kg/m²       Physical Exam  Vitals and nursing note reviewed. Constitutional:       Appearance: Normal appearance. She is well-developed. Cardiovascular:      Rate and Rhythm: Normal rate and regular rhythm. Heart sounds: Normal heart sounds, S1 normal and S2 normal.   Pulmonary:      Effort: Pulmonary effort is normal. No respiratory distress. Breath sounds: Normal breath sounds. Abdominal:      General: Bowel sounds are normal.      Palpations: Abdomen is soft. Tenderness: There is no abdominal tenderness. Skin:     General: Skin is warm and dry. Neurological:      Mental Status: She is alert and oriented to person, place, and time.                Data:     Lab Results   Component Value Date     11/10/2021    K 4.0 11/10/2021    CL 97 11/10/2021    CO2 28 11/10/2021    BUN 20 11/10/2021    CREATININE 0.92 11/10/2021    GLUCOSE 89 11/10/2021    PROT 7.6 12/09/2018    LABALBU 3.9 12/09/2018    BILITOT 0.35 12/09/2018    ALKPHOS 94 12/09/2018    AST 21 12/09/2018    ALT 25 12/09/2018     Lab Results   Component Value Date    WBC 7.7 11/10/2021    RBC 4.73 11/10/2021    HGB 14.1 11/10/2021    HCT 41.4 11/10/2021    MCV 87.5 11/10/2021    MCH 29.7 11/10/2021    MCHC 34.0 11/10/2021    RDW 14.3 11/10/2021     11/10/2021    MPV NOT REPORTED 11/10/2021     Lab Results   Component maintenance. Continue current medications, diet and exercise. Discussed use, benefit, and side effects of prescribed medications. Barriers to medication compliance addressed. Patient given educational materials - see patient instructions. All patient questions answered. Patient voiced understanding.

## 2022-01-04 ENCOUNTER — OFFICE VISIT (OUTPATIENT)
Dept: FAMILY MEDICINE CLINIC | Age: 62
End: 2022-01-04
Payer: COMMERCIAL

## 2022-01-04 VITALS
OXYGEN SATURATION: 98 % | TEMPERATURE: 97.9 F | SYSTOLIC BLOOD PRESSURE: 118 MMHG | DIASTOLIC BLOOD PRESSURE: 60 MMHG | BODY MASS INDEX: 38.62 KG/M2 | WEIGHT: 218 LBS | HEART RATE: 76 BPM

## 2022-01-04 DIAGNOSIS — E89.0 POSTABLATIVE HYPOTHYROIDISM: ICD-10-CM

## 2022-01-04 DIAGNOSIS — J01.00 ACUTE NON-RECURRENT MAXILLARY SINUSITIS: ICD-10-CM

## 2022-01-04 DIAGNOSIS — E11.9 TYPE 2 DIABETES MELLITUS WITHOUT COMPLICATION, WITHOUT LONG-TERM CURRENT USE OF INSULIN (HCC): Primary | ICD-10-CM

## 2022-01-04 LAB — HBA1C MFR BLD: 5.3 %

## 2022-01-04 PROCEDURE — 99213 OFFICE O/P EST LOW 20 MIN: CPT | Performed by: NURSE PRACTITIONER

## 2022-01-04 PROCEDURE — 83036 HEMOGLOBIN GLYCOSYLATED A1C: CPT | Performed by: NURSE PRACTITIONER

## 2022-01-04 RX ORDER — GLIPIZIDE 2.5 MG/1
2.5 TABLET, EXTENDED RELEASE ORAL DAILY
Qty: 90 TABLET | Refills: 0 | Status: CANCELLED | OUTPATIENT
Start: 2022-01-04

## 2022-01-04 RX ORDER — METFORMIN HYDROCHLORIDE 500 MG/1
500 TABLET, EXTENDED RELEASE ORAL
Qty: 90 TABLET | Refills: 0 | Status: SHIPPED | OUTPATIENT
Start: 2022-01-04 | End: 2022-06-21 | Stop reason: SDUPTHER

## 2022-01-04 RX ORDER — DOXYCYCLINE HYCLATE 100 MG
100 TABLET ORAL 2 TIMES DAILY
Qty: 14 TABLET | Refills: 0 | Status: SHIPPED | OUTPATIENT
Start: 2022-01-04 | End: 2022-01-11

## 2022-01-04 ASSESSMENT — ENCOUNTER SYMPTOMS
DIARRHEA: 0
FACIAL SWELLING: 0
SINUS PRESSURE: 1
VOMITING: 0
COUGH: 0
NAUSEA: 0
SHORTNESS OF BREATH: 0

## 2022-01-04 NOTE — PATIENT INSTRUCTIONS
SURVEY:    You may be receiving a survey from [employer] regarding your visit today. Please complete the survey to enable us to provide the highest quality of care to you and your family. If you cannot score us a very good (5 Stars) on any question, please call the office to discuss how we could have made your experience a very good one. Thank you.     Clinical Care Team: GORDO Pacheco-CLAU Meek LPN    Clerical Team: Ericka Roland

## 2022-01-04 NOTE — PROGRESS NOTES
HPI Notes    Name: Cha Banda  : 1960         Chief Complaint:     Chief Complaint   Patient presents with    Diabetes Mellitus     Last A1C was 5.3 in . Patient is only taking her metformin once per day. History of Present Illness:        Diabetes  She presents for her follow-up diabetic visit. She has type 2 diabetes mellitus. Her disease course has been stable. There are no hypoglycemic associated symptoms. Pertinent negatives for hypoglycemia include no dizziness, headaches or seizures. Pertinent negatives for diabetes include no chest pain. Risk factors for coronary artery disease include diabetes mellitus. Current diabetic treatment includes diet and oral agent (dual therapy). She is compliant with treatment all of the time. She is following a generally healthy diet. Her breakfast blood glucose is taken between 7-8 am. Her breakfast blood glucose range is generally 110-130 mg/dl. Pt has known thyroid dysfunction. Last TSH = 1.03 on 10/15/2020. Pt currently takes synthroid 200mcg daily. Complains of left facial pain, left ear pain, and left neck pain. Started last week. Denies any fever or chills.      Past Medical History:     Past Medical History:   Diagnosis Date    Arthritis     Diabetes mellitus ([address]     Graves disease     s/p radioactive iodine due to tachycardia ablation followed by hypothyroidism    Hypothyroidism     Morbid obesity with BMI of 45.0-49.9, adult ([facility]     Tachycardia     Water retention       Reviewed all health maintenance requirements and ordered appropriate tests  Health Maintenance Due   Topic Date Due    Hepatitis C screen  Never done    Pneumococcal 0-64 years Vaccine (1 of 2 - PPSV23) Never done    HIV screen  Never done    DTaP/Tdap/Td vaccine (1 - Tdap) Never done    Shingles Vaccine (1 of 2) Never done    Diabetic retinal exam  2021    Flu vaccine (1) Never done    COVID-19 Vaccine (3 - Booster for [place] series) 10/08/2021    Lipid screen  10/15/2021    TSH testing  10/15/2021    Cervical cancer screen  12/11/2021       Past Surgical History:     Past Surgical History:   Procedure Laterality Date    APPENDECTOMY      BREAST LUMPECTOMY Left 2011    CHOLECYSTECTOMY      COLONOSCOPY  2011    COLONOSCOPY  07/15/2021    COLONOSCOPY N/A 7/15/2021    COLORECTAL CANCER SCREENING, NOT HIGH RISK performed by Sherman Goetz DO at [address]    radioactive iodine thyroid ablation due to Grave's disease    [place] TOTAL KNEE ARTHROPLASTY Left 9/11/2018    KNEE TOTAL ARTHROPLASTY performed by Fredy Porter MD at [address] [facility] Left 09/11/2018    Dr. Fahad Torres        Medications:       Prior to Admission medications    Medication Sig Start Date End Date Taking?  Authorizing Provider   metFORMIN (GLUCOPHAGE-XR) 500 MG extended release tablet Take 1 tablet by mouth daily (with breakfast) 1/4/22  Yes GORDO Giordano CNP   doxycycline hyclate (VIBRA-TABS) 100 MG tablet Take 1 tablet by mouth 2 times daily for 7 days 1/4/22 1/11/22 Yes GORDO Giordano CNP   dilTIAZem (CARDIZEM CD) 180 MG extended release capsule TAKE 1 CAPSULE BY MOUTH EVERY DAY 10/18/21  Yes Roberto Sauceda DO   levothyroxine (SYNTHROID) 200 MCG tablet TAKE 1 TABLET DAILY 2/15/21  Yes Roberto Sauceda DO   furosemide (LASIX) 20 MG tablet TAKE 1 TABLET DAILY 2/15/21  Yes Roberto Sauceda DO   Respiratory Therapy Supplies MISC CPAP Mask and Hose Size medium 9/16/19  Yes GORDO Giordano CNP   blood glucose monitor kit and supplies Check daily 12/4/18  Yes GORDO Giordano CNP   blood glucose monitor strips Check daily 12/4/18  Yes GORDO Giordano CNP   Lancets MISC Check blood sugar daily 12/4/18  Yes GORDO Giordano CNP   meloxicam (MOBIC) 7.5 MG tablet TAKE 1 TABLET DAILY  Patient not taking: Reported on 1/4/2022 2/15/21   Roberto Sauceda DO        Allergies: Sulfa antibiotics and Percocet [oxycodone-acetaminophen]    Social History:     Tobacco:    reports that she has never smoked. She has never used smokeless tobacco.  Alcohol:      reports no history of alcohol use. Drug Use:  reports no history of drug use. Family History:        Family History   Problem Relation Age of Onset    Cancer Father         lung       Review of Systems:         Review of Systems   Constitutional: Negative for chills and fever. HENT: Positive for ear pain and sinus pressure (left). Negative for facial swelling. Respiratory: Negative for cough and shortness of breath. Cardiovascular: Negative for chest pain and palpitations. Gastrointestinal: Negative for diarrhea, nausea and vomiting. Neurological: Negative for dizziness, seizures and headaches. Physical Exam:     Vitals:  /60   Pulse 76   Temp 97.9 °F (36.6 °C) (Oral)   Wt 218 lb (98.9 kg)   LMP  (LMP Unknown)   SpO2 98%   BMI 38.62 kg/m²       Physical Exam  Vitals and nursing note reviewed. Constitutional:       Appearance: Normal appearance. She is well-developed. HENT:      Right Ear: A middle ear effusion is present. Tympanic membrane is not injected, erythematous or bulging. Left Ear: A middle ear effusion is present. Tympanic membrane is not injected, erythematous or bulging. Nose:      Left Sinus: Maxillary sinus tenderness present. Mouth/Throat:      Mouth: Mucous membranes are moist.   Cardiovascular:      Rate and Rhythm: Normal rate and regular rhythm. Heart sounds: Normal heart sounds, S1 normal and S2 normal.   Pulmonary:      Effort: Pulmonary effort is normal. No respiratory distress. Breath sounds: Normal breath sounds. Abdominal:      General: Bowel sounds are normal.      Palpations: Abdomen is soft. Tenderness: There is no abdominal tenderness. Skin:     General: Skin is warm and dry.    Neurological:      Mental Status: She is alert and oriented to person, place, and time. Data:     Lab Results   Component Value Date     11/10/2021    K 4.0 11/10/2021    CL 97 11/10/2021    CO2 28 11/10/2021    BUN 20 11/10/2021    CREATININE 0.92 11/10/2021    GLUCOSE 89 11/10/2021    PROT 7.6 12/09/2018    LABALBU 3.9 12/09/2018    BILITOT 0.35 12/09/2018    ALKPHOS 94 12/09/2018    AST 21 12/09/2018    ALT 25 12/09/2018     Lab Results   Component Value Date    WBC 7.7 11/10/2021    RBC 4.73 11/10/2021    HGB 14.1 11/10/2021    HCT 41.4 11/10/2021    MCV 87.5 11/10/2021    MCH 29.7 11/10/2021    MCHC 34.0 11/10/2021    RDW 14.3 11/10/2021     11/10/2021    MPV NOT REPORTED 11/10/2021     Lab Results   Component Value Date    TSH 1.03 10/15/2020     Lab Results   Component Value Date    CHOL 191 10/15/2020    HDL 44 10/15/2020    LABA1C 5.3 01/04/2022          Assessment & Plan        Diagnosis Orders   1. Type 2 diabetes mellitus without complication, without long-term current use of insulin (HCC)  --A1c=5.3% (previously 5.3%). pt has lost about 30#. This has tremendously helped her DM control. Will discontinue glipizide and decrease metformin ER to 500mg daily. Continue diet control. POCT glycosylated hemoglobin (Hb A1C)    TSH with Reflex   2. Postablative hypothyroidism   --Last TSH in 2020. Will send for TSH and adjust as needed. 3. Acute non-recurrent maxillary sinusitis   --will start on doxycycline BID. Advised to use flonase also. Patient verbalizes understanding and agreement with plan. All questions answered. If symptoms do not resolve or worsen, return to office.            Completed Refills   Requested Prescriptions     Signed Prescriptions Disp Refills    metFORMIN (GLUCOPHAGE-XR) 500 MG extended release tablet 90 tablet 0     Sig: Take 1 tablet by mouth daily (with breakfast)    doxycycline hyclate (VIBRA-TABS) 100 MG tablet 14 tablet 0     Sig: Take 1 tablet by mouth 2 times daily for 7 days     No follow-ups on file. Orders Placed This Encounter   Medications    metFORMIN (GLUCOPHAGE-XR) 500 MG extended release tablet     Sig: Take 1 tablet by mouth daily (with breakfast)     Dispense:  90 tablet     Refill:  0    doxycycline hyclate (VIBRA-TABS) 100 MG tablet     Sig: Take 1 tablet by mouth 2 times daily for 7 days     Dispense:  14 tablet     Refill:  0     Orders Placed This Encounter   Procedures    TSH with Reflex     Standing Status:   Future     Standing Expiration Date:   1/4/2023    POCT glycosylated hemoglobin (Hb A1C)         Patient Instructions   SURVEY:    You may be receiving a survey from [employer] regarding your visit today. Please complete the survey to enable us to provide the highest quality of care to you and your family. If you cannot score us a very good (5 Stars) on any question, please call the office to discuss how we could have made your experience a very good one. Thank you. Clinical Care Team: REGINE Dos Santos LPN    Clerical Team: Naty Anderson        Electronically signed by GORDO Dos Santos CNP on 1/4/2022 at 7:31 AM           Completed Refills      Requested Prescriptions     Signed Prescriptions Disp Refills    metFORMIN (GLUCOPHAGE-XR) 500 MG extended release tablet 90 tablet 0     Sig: Take 1 tablet by mouth daily (with breakfast)    doxycycline hyclate (VIBRA-TABS) 100 MG tablet 14 tablet 0     Sig: Take 1 tablet by mouth 2 times daily for 7 days         Deena Bowden received counseling on the following healthy behaviors: medication adherence  Reviewed prior labs and health maintenance. Continue current medications, diet and exercise. Discussed use, benefit, and side effects of prescribed medications. Barriers to medication compliance addressed. Patient given educational materials - see patient instructions.     All patient questions answered. Patient voiced understanding.

## 2022-01-16 ENCOUNTER — HOSPITAL ENCOUNTER (OUTPATIENT)
Age: 62
Discharge: HOME | End: 2022-01-16
Payer: COMMERCIAL

## 2022-01-16 DIAGNOSIS — E11.9 TYPE 2 DIABETES MELLITUS WITHOUT COMPLICATION, WITHOUT LONG-TERM CURRENT USE OF INSULIN (HCC): ICD-10-CM

## 2022-01-16 LAB
THYROXINE, FREE: 2.12 NG/DL (ref 0.93–1.7)
TSH SERPL DL<=0.05 MIU/L-ACNC: 0.21 MIU/L (ref 0.3–5)

## 2022-01-16 PROCEDURE — 84439 ASSAY OF FREE THYROXINE: CPT

## 2022-01-16 PROCEDURE — 36415 COLL VENOUS BLD VENIPUNCTURE: CPT

## 2022-01-16 PROCEDURE — 84443 ASSAY THYROID STIM HORMONE: CPT

## 2022-01-17 ENCOUNTER — TELEPHONE (OUTPATIENT)
Dept: FAMILY MEDICINE CLINIC | Age: 62
End: 2022-01-17

## 2022-01-17 DIAGNOSIS — E89.0 POSTABLATIVE HYPOTHYROIDISM: Primary | ICD-10-CM

## 2022-01-17 RX ORDER — LEVOTHYROXINE SODIUM 175 UG/1
175 TABLET ORAL DAILY
Qty: 30 TABLET | Refills: 1 | Status: SHIPPED | OUTPATIENT
Start: 2022-01-17 | End: 2022-03-14

## 2022-01-17 NOTE — TELEPHONE ENCOUNTER
----- Message from GORDO Carrero CNP sent at 1/17/2022  8:17 AM EST -----  Please let pt know that her TSH is low and her T4 is high. This indicated that she is taking a little too much medication. This is probably due to her recent weight loss. I would like to decrease her down to 175mcg.  Please pend

## 2022-02-27 ENCOUNTER — HOSPITAL ENCOUNTER (OUTPATIENT)
Age: 62
Discharge: HOME | End: 2022-02-27
Payer: COMMERCIAL

## 2022-02-27 DIAGNOSIS — E89.0 POSTABLATIVE HYPOTHYROIDISM: ICD-10-CM

## 2022-02-27 LAB — TSH SERPL DL<=0.05 MIU/L-ACNC: 1.35 MIU/L (ref 0.3–5)

## 2022-02-27 PROCEDURE — 84443 ASSAY THYROID STIM HORMONE: CPT

## 2022-02-27 PROCEDURE — 36415 COLL VENOUS BLD VENIPUNCTURE: CPT

## 2022-03-13 DIAGNOSIS — E89.0 POSTABLATIVE HYPOTHYROIDISM: ICD-10-CM

## 2022-03-14 DIAGNOSIS — E89.0 POSTABLATIVE HYPOTHYROIDISM: ICD-10-CM

## 2022-03-14 RX ORDER — FUROSEMIDE 20 MG/1
TABLET ORAL
Qty: 90 TABLET | Refills: 3 | Status: SHIPPED | OUTPATIENT
Start: 2022-03-14

## 2022-03-14 RX ORDER — LEVOTHYROXINE SODIUM 175 UG/1
175 TABLET ORAL DAILY
Qty: 30 TABLET | Refills: 1 | Status: SHIPPED | OUTPATIENT
Start: 2022-03-14 | End: 2022-03-14 | Stop reason: SDUPTHER

## 2022-03-14 RX ORDER — LEVOTHYROXINE SODIUM 175 UG/1
175 TABLET ORAL DAILY
Qty: 90 TABLET | Refills: 1 | Status: SHIPPED | OUTPATIENT
Start: 2022-03-14

## 2022-03-14 NOTE — TELEPHONE ENCOUNTER
Last OV: 1/4/2022 dm  Last RX:   Next scheduled apt: 4/5/2022        Sure scripts request    RX pending

## 2022-03-14 NOTE — TELEPHONE ENCOUNTER
Patient is asking for a 90 day prescription of Levothyroxine. She states before Tanner did a 30 day just to see if it would work for her. Patient's next appt in 04/05/22.     Levothyroxine 175 MCG 90 day     Drug [address] Maintenance   Topic Date Due    Hepatitis C screen  Never done    Pneumococcal 0-64 years Vaccine (1 of 2 - PPSV23) Never done    Depression Screen  Never done    HIV screen  Never done    DTaP/Tdap/Td vaccine (1 - Tdap) Never done    Shingles Vaccine (1 of 2) Never done    Diabetic retinal exam  01/21/2021    Flu vaccine (1) Never done    COVID-19 Vaccine (3 - Booster for Pfizer series) 09/08/2021    Lipid screen  10/15/2021    Cervical cancer screen  12/11/2021    Diabetic microalbuminuria test  02/15/2022    Diabetic foot exam  09/28/2022    Breast cancer screen  10/28/2022    Potassium monitoring  11/10/2022    Creatinine monitoring  11/10/2022    A1C test (Diabetic or Prediabetic)  01/04/2023    TSH testing  02/27/2023    Colorectal Cancer Screen  07/15/2031    Hepatitis A vaccine  Aged Out    Hib vaccine  Aged Out    Meningococcal (ACWY) vaccine  Aged Out             (applicable per patient's age: Cancer Screenings, Depression Screening, Fall Risk Screening, Immunizations)    Hemoglobin A1C (%)   Date Value   01/04/2022 5.3   11/10/2021 5.3   09/28/2021 6.5     LDL Cholesterol (mg/dL)   Date Value   12/09/2018 87     LDL Calculated (mg/dL)   Date Value   10/15/2020 108     AST (U/L)   Date Value   12/09/2018 21     ALT (U/L)   Date Value   12/09/2018 25     BUN (mg/dL)   Date Value   11/10/2021 20      (goal A1C is < 7)   (goal LDL is <100) need 30-50% reduction from baseline     BP Readings from Last 3 Encounters:   01/04/22 118/60   11/16/21 108/72   09/28/21 112/74    (goal /80)      All Future Testing planned in [facility]:  Lab Frequency Next Occurrence       Next Visit Date:  Future Appointments   Date Time Provider Kimberley Cohen   4/5/2022 6:00 AM [facility]            Patient Active Problem List:     S/P knee surgery     Type 2 diabetes mellitus without complication, without long-term current use of insulin (HCC)     H/O sinus tachycardia     NASIR (obstructive sleep apnea)     Postablative hypothyroidism

## 2022-04-05 ENCOUNTER — OFFICE VISIT (OUTPATIENT)
Dept: FAMILY MEDICINE CLINIC | Age: 62
End: 2022-04-05
Payer: COMMERCIAL

## 2022-04-05 VITALS
BODY MASS INDEX: 38.26 KG/M2 | WEIGHT: 216 LBS | OXYGEN SATURATION: 96 % | SYSTOLIC BLOOD PRESSURE: 120 MMHG | DIASTOLIC BLOOD PRESSURE: 72 MMHG | TEMPERATURE: 98.4 F | HEART RATE: 84 BPM

## 2022-04-05 DIAGNOSIS — J06.9 VIRAL URI: ICD-10-CM

## 2022-04-05 DIAGNOSIS — E11.9 TYPE 2 DIABETES MELLITUS WITHOUT COMPLICATION, WITHOUT LONG-TERM CURRENT USE OF INSULIN (HCC): Primary | ICD-10-CM

## 2022-04-05 DIAGNOSIS — E89.0 POSTABLATIVE HYPOTHYROIDISM: ICD-10-CM

## 2022-04-05 LAB — HBA1C MFR BLD: 6 %

## 2022-04-05 PROCEDURE — 3044F HG A1C LEVEL LT 7.0%: CPT | Performed by: NURSE PRACTITIONER

## 2022-04-05 PROCEDURE — 99213 OFFICE O/P EST LOW 20 MIN: CPT | Performed by: NURSE PRACTITIONER

## 2022-04-05 PROCEDURE — 83036 HEMOGLOBIN GLYCOSYLATED A1C: CPT | Performed by: NURSE PRACTITIONER

## 2022-04-05 ASSESSMENT — ENCOUNTER SYMPTOMS
SHORTNESS OF BREATH: 0
NAUSEA: 0
SORE THROAT: 1
DIARRHEA: 0
COUGH: 1
VOMITING: 0
SINUS PAIN: 1
RHINORRHEA: 1

## 2022-04-05 ASSESSMENT — PATIENT HEALTH QUESTIONNAIRE - PHQ9
SUM OF ALL RESPONSES TO PHQ9 QUESTIONS 1 & 2: 0
SUM OF ALL RESPONSES TO PHQ QUESTIONS 1-9: 0
SUM OF ALL RESPONSES TO PHQ QUESTIONS 1-9: 0
1. LITTLE INTEREST OR PLEASURE IN DOING THINGS: 0
2. FEELING DOWN, DEPRESSED OR HOPELESS: 0
SUM OF ALL RESPONSES TO PHQ QUESTIONS 1-9: 0
SUM OF ALL RESPONSES TO PHQ QUESTIONS 1-9: 0

## 2022-04-05 NOTE — PROGRESS NOTES
HPI Notes    Name: Favian Linda  : 1960         Chief Complaint:     Chief Complaint   Patient presents with    Diabetes Mellitus     Patient here today for 3 month follow up. Last A1C was 5.3 in .  Hypothyroidism     Discuss medication       History of Present Illness:        Diabetes  She presents for her follow-up diabetic visit. She has type 2 diabetes mellitus. Her disease course has been stable. There are no hypoglycemic associated symptoms. Pertinent negatives for hypoglycemia include no dizziness, headaches or seizures. Pertinent negatives for diabetes include no chest pain. Symptoms are stable. Risk factors for coronary artery disease include diabetes mellitus, obesity and post-menopausal. Current diabetic treatment includes oral agent (monotherapy) and diet. She is compliant with treatment all of the time. She is following a generally healthy (does eat cookies regularly) diet. She never participates in exercise. An ACE inhibitor/angiotensin II receptor blocker is not being taken. URI   This is a new problem. The current episode started in the past 7 days. The problem has been waxing and waning. There has been no fever. Associated symptoms include congestion, coughing, rhinorrhea, sinus pain, sneezing and a sore throat. Pertinent negatives include no chest pain, diarrhea, headaches, nausea or vomiting. Treatments tried: mucinex. The treatment provided mild relief. Thyroid  Last TSH = 1.35 on 22. Pt's current dose is synthroid 175mcg daily. Pt states she feels tired all the time. Denies any other symptoms.    Past Medical History:     Past Medical History:   Diagnosis Date    Arthritis     Diabetes mellitus ([facility]     Graves disease     s/p radioactive iodine due to tachycardia ablation followed by hypothyroidism    Hypothyroidism     Morbid obesity with BMI of 45.0-49.9, adult ([facility]     Tachycardia     Water retention       Reviewed all health maintenance requirements and ordered appropriate tests  Health Maintenance Due   Topic Date Due    Hepatitis C screen  Never done    Pneumococcal 0-64 years Vaccine (1 of 2 - PPSV23) Never done    HIV screen  Never done    DTaP/Tdap/Td vaccine (1 - Tdap) Never done    Shingles Vaccine (1 of 2) Never done    Diabetic retinal exam  01/21/2021    COVID-19 Vaccine (3 - Booster for Pfizer series) 09/08/2021    Lipid screen  10/15/2021    Cervical cancer screen  12/11/2021    Diabetic microalbuminuria test  02/15/2022       Past Surgical History:     Past Surgical History:   Procedure Laterality Date    APPENDECTOMY      BREAST LUMPECTOMY Left 2011    CHOLECYSTECTOMY      COLONOSCOPY  2011    COLONOSCOPY  07/15/2021    COLONOSCOPY N/A 7/15/2021    COLORECTAL CANCER SCREENING, NOT HIGH RISK performed by Xiao Cobos DO at [address]    radioactive iodine thyroid ablation due to Grave's disease    [place] TOTAL KNEE ARTHROPLASTY Left 9/11/2018    KNEE TOTAL ARTHROPLASTY performed by Curt Gudino MD at [facility] Left 09/11/2018    Dr. Reyna Curling        Medications:       Prior to Admission medications    Medication Sig Start Date End Date Taking?  Authorizing Provider   furosemide (LASIX) 20 MG tablet TAKE 1 TABLET BY MOUTH EVERY DAY 3/14/22  Yes GORDO Dodge CNP   levothyroxine (SYNTHROID) 175 MCG tablet Take 1 tablet by mouth daily 3/14/22  Yes GORDO Dodge CNP   metFORMIN (GLUCOPHAGE-XR) 500 MG extended release tablet Take 1 tablet by mouth daily (with breakfast) 1/4/22  Yes GORDO Dodge CNP   dilTIAZem (CARDIZEM CD) 180 MG extended release capsule TAKE 1 CAPSULE BY MOUTH EVERY DAY 10/18/21  Yes Alannah Thomas DO   meloxicam (MOBIC) 7.5 MG tablet TAKE 1 TABLET DAILY 2/15/21  Yes Alannah Thomas DO   Respiratory Therapy Supplies MISC CPAP Mask and Hose Size medium 9/16/19  Yes GORDO Dodge CNP   blood glucose monitor kit and supplies Check daily 12/4/18  Yes GORDO Grullon CNP   blood glucose monitor strips Check daily 12/4/18  Yes GORDO Grullon CNP   Lancets MISC Check blood sugar daily 12/4/18  Yes GORDO Grullon CNP        Allergies:       Sulfa antibiotics and Percocet [oxycodone-acetaminophen]    Social History:     Tobacco:    reports that she has never smoked. She has never used smokeless tobacco.  Alcohol:      reports no history of alcohol use. Drug Use:  reports no history of drug use. Family History:     Family History   Problem Relation Age of Onset    Cancer Father         lung       Review of Systems:         Review of Systems   Constitutional: Negative for chills and fever. HENT: Positive for congestion, rhinorrhea, sinus pain, sneezing and sore throat. Respiratory: Positive for cough. Negative for shortness of breath. Cardiovascular: Negative for chest pain and palpitations. Gastrointestinal: Negative for diarrhea, nausea and vomiting. Neurological: Negative for dizziness, seizures and headaches. Physical Exam:     Vitals:  /72   Pulse 84   Temp 98.4 °F (36.9 °C) (Oral)   Wt 216 lb (98 kg)   LMP  (LMP Unknown)   SpO2 96%   BMI 38.26 kg/m²       Physical Exam  Vitals and nursing note reviewed. Constitutional:       Appearance: She is well-developed. HENT:      Nose: Rhinorrhea present. Mouth/Throat:      Pharynx: Posterior oropharyngeal erythema present. No oropharyngeal exudate. Cardiovascular:      Rate and Rhythm: Normal rate and regular rhythm. Pulmonary:      Effort: Pulmonary effort is normal. No respiratory distress. Breath sounds: Normal breath sounds. Skin:     General: Skin is warm and dry. Neurological:      Mental Status: She is alert and oriented to person, place, and time.                Data:     Lab Results   Component Value Date     11/10/2021    K 4.0 11/10/2021    CL 97 11/10/2021    CO2 28 11/10/2021 BUN 20 11/10/2021    CREATININE 0.92 11/10/2021    GLUCOSE 89 11/10/2021    PROT 7.6 12/09/2018    LABALBU 3.9 12/09/2018    BILITOT 0.35 12/09/2018    ALKPHOS 94 12/09/2018    AST 21 12/09/2018    ALT 25 12/09/2018     Lab Results   Component Value Date    WBC 7.7 11/10/2021    RBC 4.73 11/10/2021    HGB 14.1 11/10/2021    HCT 41.4 11/10/2021    MCV 87.5 11/10/2021    MCH 29.7 11/10/2021    MCHC 34.0 11/10/2021    RDW 14.3 11/10/2021     11/10/2021    MPV NOT REPORTED 11/10/2021     Lab Results   Component Value Date    TSH 1.35 02/27/2022     Lab Results   Component Value Date    CHOL 191 10/15/2020    HDL 44 10/15/2020    LABA1C 6.0 04/05/2022          Assessment & Plan        Diagnosis Orders   1. Type 2 diabetes mellitus without complication, without long-term current use of insulin (HCC)  --A1c=6.0% (previously 5.3%). Last time glipizide was discontinued and metformin was decreased, so it is not surprising that pt's A1c increased. Continue metformin XR 500mg daily. Continue diet control. If A1c continues to rise, will increase metformin dose. POCT glycosylated hemoglobin (Hb A1C)   2. Postablative hypothyroidism   --TSH normal at last check. Fatigue most likely secondary to URI, obesity, age etc.  TSH with Reflex   3. Viral URI   --Coricidin HBP Cold and Cough 1 tab every 6 hours as needed (nasal decongestant and antihistamine)  Flonase 1 spray each nostril twice daily (nasal steroid)   Warm tea with 1tbsp honey (soothes the throat)  Increase water intake  Rest                      Completed Refills   Requested Prescriptions      No prescriptions requested or ordered in this encounter     No follow-ups on file. No orders of the defined types were placed in this encounter.     Orders Placed This Encounter   Procedures    TSH with Reflex     Standing Status:   Future     Standing Expiration Date:   4/5/2023    POCT glycosylated hemoglobin (Hb A1C)         There are no Patient Instructions on file for this visit.     Electronically signed by GORDO Hardy CNP on 4/5/2022 at 8:04 AM           Completed Refills   Requested Prescriptions      No prescriptions requested or ordered in this encounter

## 2022-04-05 NOTE — LETTER
[address]  [place] [id number]  Phone: [phone]  Fax: Chris Ac 2807, APRN - [facility]        April 5, 2022    [address] [place] [address]      Dear Starr Salines:    Coricidin HBP Cold and Cough 1 tab every 6 hours as needed (nasal decongestant and antihistamine)  Flonase 1 spray each nostril twice daily (nasal steroid)  Ibuprofen 3 times a day (antiinflammatory)  Warm tea with 1tbsp honey (soothes the throat)  Increase water intake  Rest      If you have any questions or concerns, please don't hesitate to call.     Sincerely,          Margret Taylor, GORDO - CNP

## 2022-04-11 RX ORDER — DILTIAZEM HYDROCHLORIDE 180 MG/1
CAPSULE, COATED, EXTENDED RELEASE ORAL
Qty: 90 CAPSULE | Refills: 1 | Status: SHIPPED | OUTPATIENT
Start: 2022-04-11 | End: 2022-07-18

## 2022-05-02 RX ORDER — MELOXICAM 7.5 MG/1
TABLET ORAL
Qty: 90 TABLET | Refills: 3 | OUTPATIENT
Start: 2022-05-02

## 2022-05-02 RX ORDER — MELOXICAM 7.5 MG/1
TABLET ORAL
Qty: 90 TABLET | Refills: 3 | Status: SHIPPED | OUTPATIENT
Start: 2022-05-02

## 2022-05-02 NOTE — TELEPHONE ENCOUNTER
Last OV: 4/5/2022  Next scheduled apt: 10/4/2022      Requesting refill of Meloxicam, pending for Papo.

## 2022-06-01 ENCOUNTER — OFFICE VISIT (OUTPATIENT)
Dept: FAMILY MEDICINE CLINIC | Age: 62
End: 2022-06-01
Payer: COMMERCIAL

## 2022-06-01 VITALS
BODY MASS INDEX: 38.44 KG/M2 | HEART RATE: 80 BPM | DIASTOLIC BLOOD PRESSURE: 70 MMHG | OXYGEN SATURATION: 98 % | WEIGHT: 217 LBS | TEMPERATURE: 97.8 F | SYSTOLIC BLOOD PRESSURE: 120 MMHG

## 2022-06-01 DIAGNOSIS — J35.1 ENLARGED TONSILS: Primary | ICD-10-CM

## 2022-06-01 DIAGNOSIS — R09.82 PND (POST-NASAL DRIP): ICD-10-CM

## 2022-06-01 PROCEDURE — 99213 OFFICE O/P EST LOW 20 MIN: CPT | Performed by: NURSE PRACTITIONER

## 2022-06-01 ASSESSMENT — ENCOUNTER SYMPTOMS
DIARRHEA: 0
SORE THROAT: 1
COUGH: 0
VOMITING: 0
SWOLLEN GLANDS: 0
NAUSEA: 0
SHORTNESS OF BREATH: 0

## 2022-06-01 NOTE — PROGRESS NOTES
HPI Notes    Name: Al Marin  : 1960         Chief Complaint:     Chief Complaint   Patient presents with    Pharyngitis     Sore throat started May 20th. Painful to the touch on both sides. History of Present Illness:        Pharyngitis  This is a new problem. The current episode started 1 to 4 weeks ago (10 days ago). The problem occurs daily. The problem has been waxing and waning. Associated symptoms include a sore throat. Pertinent negatives include no anorexia, chest pain, chills, congestion, coughing, fever, headaches, nausea, swollen glands or vomiting. She has tried acetaminophen for the symptoms.        Past Medical History:     Past Medical History:   Diagnosis Date    Arthritis     Diabetes mellitus ([facility]     Graves disease     s/p radioactive iodine due to tachycardia ablation followed by hypothyroidism    Hypothyroidism     Morbid obesity with BMI of 45.0-49.9, adult ([facility]     Tachycardia     Water retention       Reviewed all health maintenance requirements and ordered appropriate tests  Health Maintenance Due   Topic Date Due    Pneumococcal 0-64 years Vaccine (1 - PCV) Never done    HIV screen  Never done    Hepatitis C screen  Never done    DTaP/Tdap/Td vaccine (1 - Tdap) Never done    Shingles vaccine (1 of 2) Never done    Diabetic retinal exam  2021    COVID-19 Vaccine (3 - Booster for Pfizer series) 2021    Lipids  10/15/2021    Cervical cancer screen  2021    Diabetic microalbuminuria test  02/15/2022       Past Surgical History:     Past Surgical History:   Procedure Laterality Date    APPENDECTOMY      BREAST LUMPECTOMY Left     CHOLECYSTECTOMY      COLONOSCOPY      COLONOSCOPY  07/15/2021    COLONOSCOPY N/A 7/15/2021    COLORECTAL CANCER SCREENING, NOT HIGH RISK performed by Tera Garzon DO at [address]    radioactive iodine thyroid ablation due to Grave's disease    [place] TOTAL KNEE ARTHROPLASTY Left 9/11/2018    KNEE TOTAL ARTHROPLASTY performed by Cha Tomlinson MD at [address] Left 09/11/2018    Dr. Zane Rascon        Medications:       Prior to Admission medications    Medication Sig Start Date End Date Taking? Authorizing Provider   meloxicam (MOBIC) 7.5 MG tablet TAKE 1 TABLET DAILY 5/2/22  Yes GORDO Kam CNP   dilTIAZem (CARDIZEM CD) 180 MG extended release capsule TAKE 1 CAPSULE BY MOUTH EVERY DAY 4/11/22  Yes GORDO Kam CNP   furosemide (LASIX) 20 MG tablet TAKE 1 TABLET BY MOUTH EVERY DAY 3/14/22  Yes GORDO Kam CNP   levothyroxine (SYNTHROID) 175 MCG tablet Take 1 tablet by mouth daily 3/14/22  Yes GORDO Kam CNP   metFORMIN (GLUCOPHAGE-XR) 500 MG extended release tablet Take 1 tablet by mouth daily (with breakfast) 1/4/22  Yes GORDO Kam CNP   Respiratory Therapy Supplies MISC CPAP Mask and Hose Size medium 9/16/19  Yes GORDO Kam CNP   blood glucose monitor kit and supplies Check daily 12/4/18  Yes GORDO Kam CNP   blood glucose monitor strips Check daily 12/4/18  Yes GORDO Kam CNP   Lancets MISC Check blood sugar daily 12/4/18  Yes GORDO Kam CNP        Allergies:       Sulfa antibiotics and Percocet [oxycodone-acetaminophen]    Social History:     Tobacco:    reports that she has never smoked. She has never used smokeless tobacco.  Alcohol:      reports no history of alcohol use. Drug Use:  reports no history of drug use. Family History:     Family History   Problem Relation Age of Onset    Cancer Father         lung       Review of Systems:         Review of Systems   Constitutional: Negative for chills and fever. HENT: Positive for sore throat. Negative for congestion. Respiratory: Negative for cough and shortness of breath. Cardiovascular: Negative for chest pain and palpitations.    Gastrointestinal: Negative for anorexia, diarrhea, nausea and vomiting. Neurological: Negative for dizziness, seizures and headaches. Physical Exam:     Vitals:  /70   Pulse 80   Temp 97.8 °F (36.6 °C)   Wt 217 lb (98.4 kg)   LMP  (LMP Unknown)   SpO2 98%   BMI 38.44 kg/m²       Physical Exam  Vitals and nursing note reviewed. Constitutional:       Appearance: Normal appearance. She is well-developed. HENT:      Right Ear: Tympanic membrane normal.      Left Ear: Tympanic membrane normal.      Nose: No mucosal edema or rhinorrhea. Right Sinus: No maxillary sinus tenderness or frontal sinus tenderness. Left Sinus: No maxillary sinus tenderness or frontal sinus tenderness. Mouth/Throat:      Pharynx: Posterior oropharyngeal erythema present. No oropharyngeal exudate. Tonsils: 2+ on the right. 2+ on the left. Neck:      Thyroid: No thyroid mass, thyromegaly or thyroid tenderness. Cardiovascular:      Rate and Rhythm: Normal rate and regular rhythm. Heart sounds: Normal heart sounds, S1 normal and S2 normal.   Pulmonary:      Effort: Pulmonary effort is normal. No respiratory distress. Breath sounds: Normal breath sounds. Abdominal:      General: Bowel sounds are normal.      Palpations: Abdomen is soft. Tenderness: There is no abdominal tenderness. Lymphadenopathy:      Cervical: Cervical adenopathy present. Right cervical: Superficial cervical adenopathy (submandibular right > left) present. Left cervical: Superficial cervical adenopathy present. Skin:     General: Skin is warm and dry. Neurological:      Mental Status: She is alert and oriented to person, place, and time.                Data:     Lab Results   Component Value Date     11/10/2021    K 4.0 11/10/2021    CL 97 11/10/2021    CO2 28 11/10/2021    BUN 20 11/10/2021    CREATININE 0.92 11/10/2021    GLUCOSE 89 11/10/2021    PROT 7.6 12/09/2018    LABALBU 3.9 12/09/2018    BILITOT 0.35 12/09/2018    ALKPHOS 94 12/09/2018    AST 21 12/09/2018    ALT 25 12/09/2018     Lab Results   Component Value Date    WBC 7.7 11/10/2021    RBC 4.73 11/10/2021    HGB 14.1 11/10/2021    HCT 41.4 11/10/2021    MCV 87.5 11/10/2021    MCH 29.7 11/10/2021    MCHC 34.0 11/10/2021    RDW 14.3 11/10/2021     11/10/2021    MPV NOT REPORTED 11/10/2021     Lab Results   Component Value Date    TSH 1.35 02/27/2022     Lab Results   Component Value Date    CHOL 191 10/15/2020    HDL 44 10/15/2020    LABA1C 6.0 04/05/2022          Assessment & Plan        Diagnosis Orders   1. Enlarged tonsils  US HEAD NECK SOFT TISSUE THYROID   2. PND (post-nasal drip)  US HEAD NECK SOFT TISSUE THYROID     Pt will start zyrtec and flonase regularly x 2 weeks. Will send for US head/neck/thyroid. Patient verbalizes understanding and agreement with plan. All questions answered. If symptoms do not resolve or worsen, return to office. Completed Refills   Requested Prescriptions      No prescriptions requested or ordered in this encounter     No follow-ups on file. No orders of the defined types were placed in this encounter. Orders Placed This Encounter   Procedures    US HEAD NECK SOFT TISSUE THYROID     This procedure can be scheduled via [facility]. Access your [facility] account by visiting [email or web address]. Standing Status:   Future     Standing Expiration Date:   6/1/2023     Order Specific Question:   Reason for exam:     Answer:   tonsil enlargement, right lymph node enlargement         There are no Patient Instructions on file for this visit.     Electronically signed by GORDO Markham CNP on 6/1/2022 at 10:22 AM           Completed Refills   Requested Prescriptions      No prescriptions requested or ordered in this encounter

## 2022-06-03 ENCOUNTER — HOSPITAL ENCOUNTER (OUTPATIENT)
Dept: ULTRASOUND IMAGING | Age: 62
Discharge: HOME | End: 2022-06-05
Payer: COMMERCIAL

## 2022-06-03 DIAGNOSIS — J35.1 ENLARGED TONSILS: ICD-10-CM

## 2022-06-03 DIAGNOSIS — R09.82 PND (POST-NASAL DRIP): ICD-10-CM

## 2022-06-03 PROCEDURE — 76536 US EXAM OF HEAD AND NECK: CPT

## 2022-06-21 RX ORDER — METFORMIN HYDROCHLORIDE 500 MG/1
500 TABLET, EXTENDED RELEASE ORAL
Qty: 90 TABLET | Refills: 0 | Status: SHIPPED | OUTPATIENT
Start: 2022-06-21 | End: 2022-10-19

## 2022-06-21 NOTE — TELEPHONE ENCOUNTER
Metformin 500 mg XR    DM-[facility]    Last check up 4/5/22    Health Maintenance   Topic Date Due    Pneumococcal 0-64 years Vaccine (1 - PCV) Never done    HIV screen  Never done    Hepatitis C screen  Never done    DTaP/Tdap/Td vaccine (1 - Tdap) Never done    Shingles vaccine (1 of 2) Never done    Diabetic retinal exam  01/21/2021    COVID-19 Vaccine (3 - Booster for Pfizer series) 09/08/2021    Lipids  10/15/2021    Cervical cancer screen  12/11/2021    Diabetic microalbuminuria test  02/15/2022    Flu vaccine (Season Ended) 09/01/2022    Diabetic foot exam  09/28/2022    Breast cancer screen  10/28/2022    A1C test (Diabetic or Prediabetic)  04/05/2023    Depression Screen  04/05/2023    Colorectal Cancer Screen  07/15/2031    Hepatitis A vaccine  Aged Out    Hib vaccine  Aged Out    Meningococcal (ACWY) vaccine  Aged Out             (applicable per patient's age: Cancer Screenings, Depression Screening, Fall Risk Screening, Immunizations)    Hemoglobin A1C (%)   Date Value   04/05/2022 6.0   01/04/2022 5.3   11/10/2021 5.3     LDL Cholesterol (mg/dL)   Date Value   12/09/2018 87     LDL Calculated (mg/dL)   Date Value   10/15/2020 108     AST (U/L)   Date Value   12/09/2018 21     ALT (U/L)   Date Value   12/09/2018 25     BUN (mg/dL)   Date Value   11/10/2021 20      (goal A1C is < 7)   (goal LDL is <100) need 30-50% reduction from baseline     BP Readings from Last 3 Encounters:   06/01/22 120/70   04/05/22 120/72   01/04/22 118/60    (goal /80)      All Future Testing planned in [facility]:  Lab Frequency Next Occurrence   TSH with Reflex Once 08/17/2022       Next Visit Date:  Future Appointments   Date Time Provider Kimberley Cohen   10/4/2022  6:00 AM [facility]            Patient Active Problem List:     S/P knee surgery     Type 2 diabetes mellitus without complication, without long-term current use of insulin ([address]     H/O sinus tachycardia     NASIR (obstructive sleep apnea)     Postablative hypothyroidism

## 2022-06-21 NOTE — TELEPHONE ENCOUNTER
Last OV: 6/1/2022 tonsils   Last RX:    Next scheduled apt: 10/4/2022 6 month         Pt requesting a refill to DM

## 2022-07-18 RX ORDER — DILTIAZEM HYDROCHLORIDE 180 MG/1
CAPSULE, COATED, EXTENDED RELEASE ORAL
Qty: 90 CAPSULE | Refills: 1 | Status: SHIPPED | OUTPATIENT
Start: 2022-07-18

## 2022-07-18 NOTE — TELEPHONE ENCOUNTER
Last OV: 04/05/22 chronic  Last RX:    Next scheduled apt: 10/4/2022 6 month chronic        Surescript requesting a refill

## 2022-08-26 NOTE — TELEPHONE ENCOUNTER
Patient is asking for a refill on Metformin 500 mg    [facility]    Health Maintenance   Topic Date Due    Hepatitis C screen  1960    Pneumococcal 0-64 years Vaccine (1 of 1 - PPSV23) 09/10/1966    Diabetic foot exam  09/10/1970    Diabetic retinal exam  09/10/1970    HIV screen  09/10/1975    Hepatitis B Vaccine (1 of 3 - Risk 3-dose series) 09/10/1979    DTaP/Tdap/Td vaccine (1 - Tdap) 09/10/1979    Shingles Vaccine (1 of 2) 09/10/2010    Colon cancer screen colonoscopy  09/10/2010    Flu vaccine (1) 09/01/2019    Lipid screen  12/09/2019    Potassium monitoring  12/09/2019    Creatinine monitoring  12/09/2019    A1C test (Diabetic or Prediabetic)  05/21/2020    Diabetic microalbuminuria test  05/21/2020    Breast cancer screen  07/25/2020    Cervical cancer screen  12/11/2021             (applicable per patient's age: Cancer Screenings, Depression Screening, Fall Risk Screening, Immunizations)    Hemoglobin A1C (%)   Date Value   05/21/2019 6.0   12/04/2018 6.5   07/21/2018 7.6 (H)     LDL Cholesterol (mg/dL)   Date Value   12/09/2018 87     AST (U/L)   Date Value   12/09/2018 21     ALT (U/L)   Date Value   12/09/2018 25     BUN (mg/dL)   Date Value   12/09/2018 17      (goal A1C is < 7)   (goal LDL is <100) need 30-50% reduction from baseline     BP Readings from Last 3 Encounters:   05/21/19 114/70   12/11/18 108/62   12/04/18 122/70    (goal /80)      All Future Testing planned in [facility]:  Lab Frequency Next Occurrence   TSH With Reflex Ft4 Once 02/11/2020   TSH With Reflex Ft4 Once 05/21/2020   RIP DIGITAL SCREEN W OR WO CAD BILATERAL Once 07/25/2019       Next Visit Date:  Future Appointments   Date Time Provider Kimberley Cohen   8/7/2019  4:30 PM [address] [facility]            Patient Active Problem List:     S/P knee surgery     Type 2 diabetes mellitus without complication, without long-term current use of insulin ([address]     Thyroid disease     H/O 500

## 2022-10-19 RX ORDER — METFORMIN HYDROCHLORIDE 500 MG/1
TABLET, EXTENDED RELEASE ORAL
Qty: 90 TABLET | Refills: 0 | Status: SHIPPED | OUTPATIENT
Start: 2022-10-19

## 2022-10-19 NOTE — TELEPHONE ENCOUNTER
Last OV: 6/1/2022 04/05/22 chronic   Last RX:    Next scheduled apt: Visit date not found             Surescript requesting a refill

## 2022-12-03 DIAGNOSIS — E89.0 POSTABLATIVE HYPOTHYROIDISM: ICD-10-CM

## 2022-12-05 RX ORDER — LEVOTHYROXINE SODIUM 175 UG/1
175 TABLET ORAL DAILY
Qty: 30 TABLET | Refills: 0 | Status: SHIPPED | OUTPATIENT
Start: 2022-12-05

## 2022-12-05 NOTE — TELEPHONE ENCOUNTER
Last OV 4/22 for DM , Hypoth  Requesting refill on levothyroxine thru [facility]  Patient was a no show in Oct, 22  Pended a 30 day supply

## 2022-12-12 ENCOUNTER — OFFICE VISIT (OUTPATIENT)
Dept: FAMILY MEDICINE CLINIC | Age: 62
End: 2022-12-12
Payer: COMMERCIAL

## 2022-12-12 VITALS
OXYGEN SATURATION: 97 % | BODY MASS INDEX: 38.45 KG/M2 | HEIGHT: 63 IN | SYSTOLIC BLOOD PRESSURE: 122 MMHG | WEIGHT: 217 LBS | TEMPERATURE: 98 F | DIASTOLIC BLOOD PRESSURE: 76 MMHG

## 2022-12-12 DIAGNOSIS — Z12.31 BREAST CANCER SCREENING BY MAMMOGRAM: ICD-10-CM

## 2022-12-12 DIAGNOSIS — E11.9 TYPE 2 DIABETES MELLITUS WITHOUT COMPLICATION, WITHOUT LONG-TERM CURRENT USE OF INSULIN (HCC): Primary | ICD-10-CM

## 2022-12-12 DIAGNOSIS — E89.0 POSTABLATIVE HYPOTHYROIDISM: ICD-10-CM

## 2022-12-12 LAB — HBA1C MFR BLD: 6.6 %

## 2022-12-12 PROCEDURE — 3044F HG A1C LEVEL LT 7.0%: CPT | Performed by: NURSE PRACTITIONER

## 2022-12-12 PROCEDURE — 99214 OFFICE O/P EST MOD 30 MIN: CPT | Performed by: NURSE PRACTITIONER

## 2022-12-12 PROCEDURE — 83036 HEMOGLOBIN GLYCOSYLATED A1C: CPT | Performed by: NURSE PRACTITIONER

## 2022-12-12 RX ORDER — FLUCONAZOLE 150 MG/1
150 TABLET ORAL ONCE
Qty: 1 TABLET | Refills: 0 | Status: SHIPPED | OUTPATIENT
Start: 2022-12-12 | End: 2022-12-12

## 2022-12-12 RX ORDER — LEVOTHYROXINE SODIUM 175 UG/1
175 TABLET ORAL DAILY
Qty: 90 TABLET | Refills: 2 | Status: SHIPPED | OUTPATIENT
Start: 2022-12-12

## 2022-12-12 RX ORDER — FUROSEMIDE 20 MG/1
20 TABLET ORAL DAILY
Qty: 90 TABLET | Refills: 1 | Status: SHIPPED | OUTPATIENT
Start: 2022-12-12

## 2022-12-12 RX ORDER — METFORMIN HYDROCHLORIDE 500 MG/1
500 TABLET, EXTENDED RELEASE ORAL
Qty: 90 TABLET | Refills: 0 | Status: SHIPPED | OUTPATIENT
Start: 2022-12-12

## 2022-12-12 RX ORDER — DILTIAZEM HYDROCHLORIDE 180 MG/1
180 CAPSULE, COATED, EXTENDED RELEASE ORAL DAILY
Qty: 90 CAPSULE | Refills: 1 | Status: SHIPPED | OUTPATIENT
Start: 2022-12-12

## 2022-12-12 SDOH — ECONOMIC STABILITY: FOOD INSECURITY: WITHIN THE PAST 12 MONTHS, THE FOOD YOU BOUGHT JUST DIDN'T LAST AND YOU DIDN'T HAVE MONEY TO GET MORE.: NEVER TRUE

## 2022-12-12 SDOH — ECONOMIC STABILITY: FOOD INSECURITY: WITHIN THE PAST 12 MONTHS, YOU WORRIED THAT YOUR FOOD WOULD RUN OUT BEFORE YOU GOT MONEY TO BUY MORE.: NEVER TRUE

## 2022-12-12 ASSESSMENT — SOCIAL DETERMINANTS OF HEALTH (SDOH): HOW HARD IS IT FOR YOU TO PAY FOR THE VERY BASICS LIKE FOOD, HOUSING, MEDICAL CARE, AND HEATING?: NOT HARD AT ALL

## 2022-12-12 ASSESSMENT — ENCOUNTER SYMPTOMS
COUGH: 0
VOMITING: 0
SHORTNESS OF BREATH: 0
NAUSEA: 0

## 2022-12-12 NOTE — PROGRESS NOTES
HPI Notes    Name: Monse Oleary  : 1960         Chief Complaint:     Chief Complaint   Patient presents with    Diabetes Mellitus    Hypothyroidism       History of Present Illness:        Diabetes  She presents for her follow-up diabetic visit. She has type 2 diabetes mellitus. Her disease course has been stable. Hypoglycemia symptoms include dizziness and headaches. Pertinent negatives for hypoglycemia include no seizures or sleepiness. Pertinent negatives for diabetes include no chest pain, no foot paresthesias and no weakness. There are no hypoglycemic complications. Symptoms are stable. Risk factors for coronary artery disease include obesity and diabetes mellitus. Current diabetic treatment includes oral agent (monotherapy). She is compliant with treatment all of the time. Her weight is stable. She is following a generally unhealthy diet. Her breakfast blood glucose is taken between 6-7 am. Her breakfast blood glucose range is generally 110-130 mg/dl. Patient has a history of thyroid ablation that she had in . She is currently taking Synthroid 175mcg once daily. She admits to increased fatigue all the time.    Past Medical History:     Past Medical History:   Diagnosis Date    Arthritis     Diabetes mellitus ([address]     Graves disease     s/p radioactive iodine due to tachycardia ablation followed by hypothyroidism    Hypothyroidism     Morbid obesity with BMI of 45.0-49.9, adult ([address]     Tachycardia     Water retention       Reviewed all health maintenance requirements and ordered appropriate tests  Health Maintenance Due   Topic Date Due    Pneumococcal 0-64 years Vaccine (1 - PCV) Never done    HIV screen  Never done    Hepatitis C screen  Never done    DTaP/Tdap/Td vaccine (1 - Tdap) Never done    Shingles vaccine (1 of 2) Never done    Diabetic retinal exam  2021    COVID-19 Vaccine (3 - Booster for Pfizer series) 2021    Lipids  10/15/2021    Cervical cancer screen 12/11/2021    Diabetic microalbuminuria test  02/15/2022    Flu vaccine (1) Never done    Diabetic foot exam  09/28/2022    Breast cancer screen  10/28/2022       Past Surgical History:     Past Surgical History:   Procedure Laterality Date    APPENDECTOMY      BREAST LUMPECTOMY Left 2011    CHOLECYSTECTOMY      COLONOSCOPY  2011    COLONOSCOPY  07/15/2021    COLONOSCOPY N/A 7/15/2021    COLORECTAL CANCER SCREENING, NOT HIGH RISK performed by Percival Kocher, DO at [address] [place] [address]    radioactive iodine thyroid ablation due to Grave's disease    [place] TOTAL KNEE ARTHROPLASTY Left 9/11/2018    KNEE TOTAL ARTHROPLASTY performed by Trudy Valladares MD at [address] [place] [address] Left 09/11/2018    Dr. Mildred Pérez        Medications:       Prior to Admission medications    Medication Sig Start Date End Date Taking?  Authorizing Provider   levothyroxine (SYNTHROID) 175 MCG tablet TAKE 1 TABLET BY MOUTH DAILY 12/5/22  Yes GORDO Pichardo CNP   metFORMIN (GLUCOPHAGE-XR) 500 MG extended release tablet TAKE 1 TABLET BY MOUTH DAILY WITH BREAKFAST 10/19/22  Yes GORDO Pichardo CNP   dilTIAZem (CARDIZEM CD) 180 MG extended release capsule TAKE 1 CAPSULE BY MOUTH EVERY DAY 7/18/22  Yes GORDO Pichardo CNP   meloxicam (MOBIC) 7.5 MG tablet TAKE 1 TABLET DAILY 5/2/22  Yes GORDO Pichardo CNP   furosemide (LASIX) 20 MG tablet TAKE 1 TABLET BY MOUTH EVERY DAY 3/14/22  Yes GORDO Pichardo CNP   Respiratory Therapy Supplies MISC CPAP Mask and Hose Size medium 9/16/19  Yes GORDO Pichardo CNP   blood glucose monitor kit and supplies Check daily 12/4/18  Yes GORDO Pichardo CNP   blood glucose monitor strips Check daily 12/4/18  Yes GORDO Pichardo CNP   Lancets MISC Check blood sugar daily 12/4/18  Yes GORDO Pichardo CNP        Allergies:       Sulfa antibiotics and Percocet [oxycodone-acetaminophen]    Social History: Tobacco:    reports that she has never smoked. She has never used smokeless tobacco.  Alcohol:      reports no history of alcohol use. Drug Use:  reports no history of drug use. Family History:     Family History   Problem Relation Age of Onset    Cancer Father         lung       Review of Systems:         Review of Systems   Constitutional:  Negative for chills and fever. Respiratory:  Negative for cough and shortness of breath. Cardiovascular:  Negative for chest pain. Gastrointestinal:  Negative for nausea and vomiting. Neurological:  Positive for dizziness and headaches. Negative for seizures and weakness. Physical Exam:     Vitals:  /76   Temp 98 °F (36.7 °C) (Oral)   Ht 5' 3\" (1.6 m)   Wt 217 lb (98.4 kg)   LMP  (LMP Unknown)   SpO2 97%   BMI 38.44 kg/m²       Physical Exam  Vitals and nursing note reviewed. Constitutional:       Appearance: Normal appearance. She is well-developed. HENT:      Head: Normocephalic. Right Ear: Hearing, tympanic membrane and external ear normal.      Left Ear: Hearing, tympanic membrane and external ear normal.      Nose: Nose normal.      Mouth/Throat:      Pharynx: Uvula midline. Eyes:      Conjunctiva/sclera: Conjunctivae normal.      Pupils: Pupils are equal, round, and reactive to light. Neck:      Thyroid: No thyroid mass. Vascular: No carotid bruit. Trachea: Trachea normal.   Cardiovascular:      Rate and Rhythm: Normal rate and regular rhythm. Pulses:           Dorsalis pedis pulses are 2+ on the right side and 2+ on the left side. Heart sounds: Normal heart sounds, S1 normal and S2 normal.   Pulmonary:      Effort: Pulmonary effort is normal.      Breath sounds: Normal breath sounds. Abdominal:      Palpations: Abdomen is soft. Tenderness: There is no abdominal tenderness. Musculoskeletal:         General: Normal range of motion. Cervical back: Normal range of motion.       Right foot: No deformity or bunion. Left foot: No deformity or bunion. Feet:      Right foot:      Protective Sensation: 10 sites tested. 10 sites sensed. Skin integrity: No ulcer, blister, skin breakdown or erythema. Toenail Condition: Right toenails are normal.      Left foot:      Protective Sensation: 10 sites tested. 10 sites sensed. Skin integrity: No ulcer, blister, skin breakdown or erythema. Toenail Condition: Left toenails are normal.   Skin:     General: Skin is warm and dry. Findings: No rash. Neurological:      Mental Status: She is alert and oriented to person, place, and time. GCS: GCS eye subscore is 4. GCS verbal subscore is 5. GCS motor subscore is 6. Deep Tendon Reflexes: Reflexes are normal and symmetric. Psychiatric:         Speech: Speech normal.         Behavior: Behavior normal.         Thought Content:  Thought content normal.         Judgment: Judgment normal.         Data:     Lab Results   Component Value Date/Time     11/10/2021 04:24 PM    K 4.0 11/10/2021 04:24 PM    CL 97 11/10/2021 04:24 PM    CO2 28 11/10/2021 04:24 PM    BUN 20 11/10/2021 04:24 PM    CREATININE 0.92 11/10/2021 04:24 PM    GLUCOSE 89 11/10/2021 04:24 PM    PROT 7.6 12/09/2018 07:38 AM    LABALBU 3.9 12/09/2018 07:38 AM    BILITOT 0.35 12/09/2018 07:38 AM    ALKPHOS 94 12/09/2018 07:38 AM    AST 21 12/09/2018 07:38 AM    ALT 25 12/09/2018 07:38 AM     Lab Results   Component Value Date/Time    WBC 7.7 11/10/2021 04:24 PM    RBC 4.73 11/10/2021 04:24 PM    HGB 14.1 11/10/2021 04:24 PM    HCT 41.4 11/10/2021 04:24 PM    MCV 87.5 11/10/2021 04:24 PM    MCH 29.7 11/10/2021 04:24 PM    MCHC 34.0 11/10/2021 04:24 PM    RDW 14.3 11/10/2021 04:24 PM     11/10/2021 04:24 PM    MPV NOT REPORTED 11/10/2021 04:24 PM     Lab Results   Component Value Date/Time    TSH 1.35 02/27/2022 07:05 AM     Lab Results   Component Value Date/Time    CHOL 191 10/15/2020 12:00 AM    HDL 44 10/15/2020 12:00 AM    LABA1C 6.0 04/05/2022 06:15 AM          Assessment & Plan        Diagnosis Orders   1. Type 2 diabetes mellitus without complication, without long-term current use of insulin (HCC)     - Alc 6.6% (previously 6.0%). continue metformin at current dose. Control diet better. If A1c increases, will increase metformin. 2. Postablative hypothyroidism   --Last TSH was WNL in Feb 2022. Complaining of increased fatigue and pt \"knows it's her thyroid\". Will send for TSH       3. Breast cancer screening by mammogram          Patient verbalizes understanding and agreement with plan. All questions answered. If symptoms do not resolve or worsen, return to office. Completed Refills   Requested Prescriptions     Pending Prescriptions Disp Refills    levothyroxine (SYNTHROID) 175 MCG tablet 90 tablet 2     Sig: Take 1 tablet by mouth daily    metFORMIN (GLUCOPHAGE-XR) 500 MG extended release tablet 90 tablet 0     Sig: Take 1 tablet by mouth daily (with breakfast)    dilTIAZem (CARDIZEM CD) 180 MG extended release capsule 90 capsule 1     Sig: Take 1 capsule by mouth daily    furosemide (LASIX) 20 MG tablet 90 tablet 1     Sig: Take 1 tablet by mouth daily TAKE 1 TABLET BY MOUTH EVERY DAY    fluconazole (DIFLUCAN) 150 MG tablet 1 tablet 0     Sig: Take 1 tablet by mouth once for 1 dose     No follow-ups on file. No orders of the defined types were placed in this encounter. No orders of the defined types were placed in this encounter. There are no Patient Instructions on file for this visit.     Electronically signed by GORDO Babin CNP on 12/12/2022 at 9:02 AM           Completed Refills   Requested Prescriptions     Pending Prescriptions Disp Refills    levothyroxine (SYNTHROID) 175 MCG tablet 90 tablet 2     Sig: Take 1 tablet by mouth daily    metFORMIN (GLUCOPHAGE-XR) 500 MG extended release tablet 90 tablet 0     Sig: Take 1 tablet by mouth daily (with breakfast)    dilTIAZem (CARDIZEM CD) 180 MG extended release capsule 90 capsule 1     Sig: Take 1 capsule by mouth daily    furosemide (LASIX) 20 MG tablet 90 tablet 1     Sig: Take 1 tablet by mouth daily TAKE 1 TABLET BY MOUTH EVERY DAY    fluconazole (DIFLUCAN) 150 MG tablet 1 tablet 0     Sig: Take 1 tablet by mouth once for 1 dose

## 2022-12-20 ENCOUNTER — HOSPITAL ENCOUNTER (OUTPATIENT)
Age: 62
Setting detail: SPECIMEN
Discharge: HOME | End: 2022-12-20
Payer: COMMERCIAL

## 2022-12-20 ENCOUNTER — OFFICE VISIT (OUTPATIENT)
Dept: FAMILY MEDICINE CLINIC | Age: 62
End: 2022-12-20

## 2022-12-20 VITALS — HEART RATE: 80 BPM | SYSTOLIC BLOOD PRESSURE: 120 MMHG | DIASTOLIC BLOOD PRESSURE: 72 MMHG | OXYGEN SATURATION: 98 %

## 2022-12-20 DIAGNOSIS — Z01.419 WELL WOMAN EXAM: Primary | ICD-10-CM

## 2022-12-20 DIAGNOSIS — Z01.419 WELL WOMAN EXAM: ICD-10-CM

## 2022-12-20 LAB
CREATININE URINE POCT: NORMAL
MICROALBUMIN/CREAT 24H UR: NORMAL MG/G{CREAT}
MICROALBUMIN/CREAT UR-RTO: NORMAL

## 2022-12-20 PROCEDURE — G0145 SCR C/V CYTO,THINLAYER,RESCR: HCPCS

## 2022-12-20 ASSESSMENT — ENCOUNTER SYMPTOMS
SHORTNESS OF BREATH: 0
NAUSEA: 0
VOMITING: 0
COUGH: 0
DIARRHEA: 0

## 2022-12-20 NOTE — PROGRESS NOTES
HPI Notes    Name: Sonia Davey  : 1960         Chief Complaint:     Chief Complaint   Patient presents with    Gynecologic Exam     Patient here today for pap smear. Menopause in . Had an ablation. History of Present Illness:        Gynecologic Exam  The patient's pertinent negatives include no pelvic pain or vaginal discharge. Pertinent negatives include no chills, diarrhea, fever, headaches, nausea or vomiting. Patient is a 78-year-old female who reports for a well woman exam.  Patient had a vaginal yeast infection last week, however with treatment symptoms have resolved. Patient denies any other abnormal vaginal bleeding or discharge. Patient denies any breast pain or abnormal lumps in breasts.     Past Medical History:     Past Medical History:   Diagnosis Date    Arthritis     Diabetes mellitus ([facility]     Graves disease     s/p radioactive iodine due to tachycardia ablation followed by hypothyroidism    Hypothyroidism     Morbid obesity with BMI of 45.0-49.9, adult ([facility]     Tachycardia     Water retention       Reviewed all health maintenance requirements and ordered appropriate tests  Health Maintenance Due   Topic Date Due    Pneumococcal 0-64 years Vaccine (1 - PCV) Never done    HIV screen  Never done    Hepatitis C screen  Never done    DTaP/Tdap/Td vaccine (1 - Tdap) Never done    Shingles vaccine (1 of 2) Never done    Diabetic retinal exam  2021    COVID-19 Vaccine (3 - Booster for Pfizer series) 2021    Lipids  10/15/2021    Cervical cancer screen  2021    Diabetic microalbuminuria test  02/15/2022    Flu vaccine (1) Never done    Breast cancer screen  10/28/2022       Past Surgical History:     Past Surgical History:   Procedure Laterality Date    APPENDECTOMY      BREAST LUMPECTOMY Left     CHOLECYSTECTOMY      COLONOSCOPY      COLONOSCOPY  07/15/2021    COLONOSCOPY N/A 7/15/2021    COLORECTAL CANCER SCREENING, NOT HIGH RISK performed by Philly Rao ROBERTO Trujillo DO at [facility]    radioactive iodine thyroid ablation due to Grave's disease    DC TOTAL KNEE ARTHROPLASTY Left 9/11/2018    KNEE TOTAL ARTHROPLASTY performed by Sunil Broderick MD at [address] [place] [address] Left 09/11/2018    Dr. Erica Valencia        Medications:       Prior to Admission medications    Medication Sig Start Date End Date Taking? Authorizing Provider   levothyroxine (SYNTHROID) 175 MCG tablet Take 1 tablet by mouth daily 12/12/22  Yes Sumaya ALANNAH VillanuevaN - CNP   metFORMIN (GLUCOPHAGE-XR) 500 MG extended release tablet Take 1 tablet by mouth daily (with breakfast) 12/12/22  Yes Sumaya Rich APRN - CNP   dilTIAZem (CARDIZEM CD) 180 MG extended release capsule Take 1 capsule by mouth daily 12/12/22  Yes Sumaya RichALANNAH chinchillaN - CNP   furosemide (LASIX) 20 MG tablet Take 1 tablet by mouth daily TAKE 1 TABLET BY MOUTH EVERY DAY 12/12/22  Yes Sumaya RichALANNAH chinchillaN - CNP   meloxicam (MOBIC) 7.5 MG tablet TAKE 1 TABLET DAILY 5/2/22  Yes Sumaya ALANNAH VillanuevaN - CNP   Respiratory Therapy Supplies MISC CPAP Mask and Hose Size medium 9/16/19  Yes Sumaya GORDO Villanueva - CNP   blood glucose monitor kit and supplies Check daily 12/4/18  Yes Sumaya GORDO Villanueva - CNP   blood glucose monitor strips Check daily 12/4/18  Yes Sumaya ALANNAH VillanuevaN - CNP   Lancets MISC Check blood sugar daily 12/4/18  Yes Sumaya GORDO Villanueva CNP        Allergies:       Sulfa antibiotics and Percocet [oxycodone-acetaminophen]    Social History:     Tobacco:    reports that she has never smoked. She has never used smokeless tobacco.  Alcohol:      reports no history of alcohol use. Drug Use:  reports no history of drug use. Family History:     Family History   Problem Relation Age of Onset    Cancer Father         lung       Review of Systems:         Review of Systems   Constitutional:  Negative for chills and fever.    Respiratory:  Negative for cough and shortness of breath. Cardiovascular:  Negative for chest pain and palpitations. Gastrointestinal:  Negative for diarrhea, nausea and vomiting. Genitourinary:  Negative for menstrual problem, pelvic pain, vaginal bleeding, vaginal discharge and vaginal pain. Neurological:  Negative for dizziness, seizures and headaches. Physical Exam:     Vitals:  /72   Pulse 80   LMP  (LMP Unknown)   SpO2 98%       Physical Exam  Vitals and nursing note reviewed. Constitutional:       Appearance: Normal appearance. She is well-developed. Cardiovascular:      Rate and Rhythm: Normal rate and regular rhythm. Heart sounds: Normal heart sounds, S1 normal and S2 normal.   Pulmonary:      Effort: Pulmonary effort is normal. No respiratory distress. Breath sounds: Normal breath sounds. Chest:   Breasts:     Right: Normal. No mass or tenderness. Left: Normal. No mass or tenderness. Comments: Bilateral breast exam completed. The axial tail was palpated and there were no masses or lumps felt. The breast tissue was palpated in concentric circles starting with the outer breast and moving towards the center. No masses or lumps felt. The areola and nipple are normal color and shape. There is no discharge from the nipple. Abdominal:      General: Bowel sounds are normal.      Palpations: Abdomen is soft. Tenderness: There is no abdominal tenderness. Genitourinary:     Exam position: Lithotomy position. Comments: In the presence of a female nurse, vaginal exam was completed. Pt was put into lithotomy position. The external genitalia were examined. No rash, lesions, or tenderness noted. Bartholin's gland WNL bilat. The speculum is gently introduced into the introitus without difficulty. The cervix is easily visualized. There is no erythema to the cervix. No bleeding of the cervix. Vaginal vault without lesion or bleeding. The speculum is gently removed.  Pt tolerated the procedure well.    Bimanual exam completed without difficulty. Pt does not complain of increased tenderness with cervical motion. Bilateral ovaries are nontender. Skin:     General: Skin is warm and dry. Neurological:      Mental Status: She is alert and oriented to person, place, and time. Data:     Lab Results   Component Value Date/Time     11/10/2021 04:24 PM    K 4.0 11/10/2021 04:24 PM    CL 97 11/10/2021 04:24 PM    CO2 28 11/10/2021 04:24 PM    BUN 20 11/10/2021 04:24 PM    CREATININE 0.92 11/10/2021 04:24 PM    GLUCOSE 89 11/10/2021 04:24 PM    PROT 7.6 12/09/2018 07:38 AM    LABALBU 3.9 12/09/2018 07:38 AM    BILITOT 0.35 12/09/2018 07:38 AM    ALKPHOS 94 12/09/2018 07:38 AM    AST 21 12/09/2018 07:38 AM    ALT 25 12/09/2018 07:38 AM     Lab Results   Component Value Date/Time    WBC 7.7 11/10/2021 04:24 PM    RBC 4.73 11/10/2021 04:24 PM    HGB 14.1 11/10/2021 04:24 PM    HCT 41.4 11/10/2021 04:24 PM    MCV 87.5 11/10/2021 04:24 PM    MCH 29.7 11/10/2021 04:24 PM    MCHC 34.0 11/10/2021 04:24 PM    RDW 14.3 11/10/2021 04:24 PM     11/10/2021 04:24 PM    MPV NOT REPORTED 11/10/2021 04:24 PM     Lab Results   Component Value Date/Time    TSH 1.35 02/27/2022 07:05 AM     Lab Results   Component Value Date/Time    CHOL 191 10/15/2020 12:00 AM    HDL 44 10/15/2020 12:00 AM    LABA1C 6.6 12/12/2022 08:30 AM          Assessment & Plan        Diagnosis Orders   1. Well woman exam          Will send specimen for PAP test. Pt educated about importance of health maintenance. Patient verbalizes understanding and agreement with plan. All questions answered. If symptoms do not resolve or worsen, return to office. Completed Refills   Requested Prescriptions      No prescriptions requested or ordered in this encounter     No follow-ups on file. No orders of the defined types were placed in this encounter.     No orders of the defined types were placed in this encounter. There are no Patient Instructions on file for this visit.     Electronically signed by GORDO Patricia CNP on 12/20/2022 at 9:15 AM           Completed Refills   Requested Prescriptions      No prescriptions requested or ordered in this encounter

## 2022-12-27 ENCOUNTER — HOSPITAL ENCOUNTER (OUTPATIENT)
Age: 62
Discharge: HOME | End: 2022-12-27
Payer: COMMERCIAL

## 2022-12-27 ENCOUNTER — HOSPITAL ENCOUNTER (OUTPATIENT)
Dept: MAMMOGRAPHY | Age: 62
Discharge: HOME | End: 2022-12-29
Payer: COMMERCIAL

## 2022-12-27 DIAGNOSIS — Z12.31 BREAST CANCER SCREENING BY MAMMOGRAM: ICD-10-CM

## 2022-12-27 DIAGNOSIS — E89.0 POSTABLATIVE HYPOTHYROIDISM: ICD-10-CM

## 2022-12-27 LAB
THYROXINE, FREE: 1.95 NG/DL (ref 0.93–1.7)
TSH SERPL DL<=0.05 MIU/L-ACNC: 0.17 UIU/ML (ref 0.3–5)

## 2022-12-27 PROCEDURE — 77063 BREAST TOMOSYNTHESIS BI: CPT

## 2022-12-27 PROCEDURE — 84443 ASSAY THYROID STIM HORMONE: CPT

## 2022-12-27 PROCEDURE — 36415 COLL VENOUS BLD VENIPUNCTURE: CPT

## 2022-12-27 PROCEDURE — 84439 ASSAY OF FREE THYROXINE: CPT

## 2022-12-28 ENCOUNTER — TELEPHONE (OUTPATIENT)
Dept: FAMILY MEDICINE CLINIC | Age: 62
End: 2022-12-28

## 2022-12-28 DIAGNOSIS — E89.0 POSTABLATIVE HYPOTHYROIDISM: Primary | ICD-10-CM

## 2022-12-28 RX ORDER — LEVOTHYROXINE SODIUM 0.15 MG/1
150 TABLET ORAL DAILY
COMMUNITY
End: 2022-12-28 | Stop reason: SDUPTHER

## 2022-12-28 RX ORDER — LEVOTHYROXINE SODIUM 0.15 MG/1
150 TABLET ORAL DAILY
Qty: 60 TABLET | Refills: 1 | Status: SHIPPED | OUTPATIENT
Start: 2022-12-28

## 2022-12-28 NOTE — TELEPHONE ENCOUNTER
Patient notified of lab results and recommendation.   Patient voices understanding  Rx pending for synthroid 150 mcg daily and TSH lab

## 2022-12-28 NOTE — TELEPHONE ENCOUNTER
----- Message from GORDO Babin CNP sent at 12/28/2022  8:38 AM EST -----  Please let the patient know that her TSH is low and her T4 is high. This means that she has too much Synthroid. It is overstimulating the thyroid. I would advise that she decrease her dose down to 150 mcg daily. Remember an overstimulated thyroid can also cause fatigue, weight gain, hot and cold intolerance.

## 2023-01-03 ENCOUNTER — OFFICE VISIT (OUTPATIENT)
Dept: FAMILY MEDICINE CLINIC | Age: 63
End: 2023-01-03
Payer: COMMERCIAL

## 2023-01-03 VITALS
TEMPERATURE: 98.6 F | DIASTOLIC BLOOD PRESSURE: 60 MMHG | HEART RATE: 98 BPM | OXYGEN SATURATION: 96 % | SYSTOLIC BLOOD PRESSURE: 124 MMHG

## 2023-01-03 DIAGNOSIS — R09.82 PND (POST-NASAL DRIP): ICD-10-CM

## 2023-01-03 DIAGNOSIS — R09.89 GLOBUS SENSATION: ICD-10-CM

## 2023-01-03 DIAGNOSIS — J01.00 ACUTE NON-RECURRENT MAXILLARY SINUSITIS: Primary | ICD-10-CM

## 2023-01-03 PROCEDURE — 99213 OFFICE O/P EST LOW 20 MIN: CPT | Performed by: NURSE PRACTITIONER

## 2023-01-03 RX ORDER — FLUCONAZOLE 150 MG/1
150 TABLET ORAL ONCE
Qty: 1 TABLET | Refills: 0 | Status: SHIPPED | OUTPATIENT
Start: 2023-01-03 | End: 2023-01-03

## 2023-01-03 RX ORDER — DOXYCYCLINE HYCLATE 100 MG
100 TABLET ORAL 2 TIMES DAILY
Qty: 14 TABLET | Refills: 0 | Status: SHIPPED | OUTPATIENT
Start: 2023-01-03 | End: 2023-01-10

## 2023-01-03 ASSESSMENT — ENCOUNTER SYMPTOMS
COUGH: 1
NAUSEA: 0
RHINORRHEA: 1
SORE THROAT: 1
SHORTNESS OF BREATH: 0
DIARRHEA: 0
VOMITING: 0
SINUS PAIN: 1

## 2023-01-03 ASSESSMENT — PATIENT HEALTH QUESTIONNAIRE - PHQ9
SUM OF ALL RESPONSES TO PHQ9 QUESTIONS 1 & 2: 0
SUM OF ALL RESPONSES TO PHQ QUESTIONS 1-9: 0
SUM OF ALL RESPONSES TO PHQ QUESTIONS 1-9: 0
2. FEELING DOWN, DEPRESSED OR HOPELESS: 0
SUM OF ALL RESPONSES TO PHQ QUESTIONS 1-9: 0
1. LITTLE INTEREST OR PLEASURE IN DOING THINGS: 0
SUM OF ALL RESPONSES TO PHQ QUESTIONS 1-9: 0

## 2023-01-03 NOTE — PROGRESS NOTES
HPI Notes    Name: João Osorio  : 1960         Chief Complaint:     Chief Complaint   Patient presents with    URI     Patient here today with head congestion, drainage, cough, throat feels swollen. Possible tylenol stuck in her throat this morning. Started feeling sick on 22. She has tried OTC and not helping. History of Present Illness:        URI   This is a new problem. The current episode started 1 to 4 weeks ago (10 days). The problem has been waxing and waning. There has been no fever. Associated symptoms include congestion, coughing, ear pain, rhinorrhea, sinus pain and a sore throat. Pertinent negatives include no chest pain, diarrhea, headaches, nausea or vomiting. She has tried decongestant and acetaminophen for the symptoms. Pt took a tylenol this AM and thinks it got stuck in her throat.      Past Medical History:     Past Medical History:   Diagnosis Date    Arthritis     Diabetes mellitus ([facility]     Graves disease     s/p radioactive iodine due to tachycardia ablation followed by hypothyroidism    Hypothyroidism     Morbid obesity with BMI of 45.0-49.9, adult ([facility]     Tachycardia     Water retention       Reviewed all health maintenance requirements and ordered appropriate tests  Health Maintenance Due   Topic Date Due    Pneumococcal 0-64 years Vaccine (1 - PCV) Never done    HIV screen  Never done    Hepatitis C screen  Never done    DTaP/Tdap/Td vaccine (1 - Tdap) Never done    Shingles vaccine (1 of 2) Never done    Diabetic retinal exam  2021    COVID-19 Vaccine (3 - Booster for Pfizer series) 2021    Lipids  10/15/2021    Cervical cancer screen  2021    Flu vaccine (1) Never done    GFR test (Diabetes, CKD 3-4, OR last GFR 15-59)  11/10/2022       Past Surgical History:     Past Surgical History:   Procedure Laterality Date    APPENDECTOMY      BREAST LUMPECTOMY Left 2011    CHOLECYSTECTOMY      COLONOSCOPY  2011    COLONOSCOPY  07/15/2021 COLONOSCOPY N/A 7/15/2021    COLORECTAL CANCER SCREENING, NOT HIGH RISK performed by Janet Oliva DO at [address] [place] [address]    radioactive iodine thyroid ablation due to Grave's disease    NC ARTHRP KNE CONDYLE&PLATU MEDIAL&LAT COMPARTMENTS Left 9/11/2018    KNEE TOTAL ARTHROPLASTY performed by Bee Cantu MD at [address] [place] [address] [address] Left 09/11/2018    Dr. Val Vera        Medications:       Prior to Admission medications    Medication Sig Start Date End Date Taking?  Authorizing Provider   doxycycline hyclate (VIBRA-TABS) 100 MG tablet Take 1 tablet by mouth 2 times daily for 7 days 1/3/23 1/10/23 Yes GORDO Zamarripa CNP   fluconazole (DIFLUCAN) 150 MG tablet Take 1 tablet by mouth once for 1 dose 1/3/23 1/3/23 Yes GORDO Zamarripa CNP   levothyroxine (SYNTHROID) 150 MCG tablet Take 1 tablet by mouth Daily 12/28/22  Yes GORDO Zamarripa CNP   metFORMIN (GLUCOPHAGE-XR) 500 MG extended release tablet Take 1 tablet by mouth daily (with breakfast) 12/12/22  Yes GORDO Zamarripa CNP   dilTIAZem (CARDIZEM CD) 180 MG extended release capsule Take 1 capsule by mouth daily 12/12/22  Yes GORDO Zamarripa CNP   furosemide (LASIX) 20 MG tablet Take 1 tablet by mouth daily TAKE 1 TABLET BY MOUTH EVERY DAY 12/12/22  Yes GORDO Zamarripa CNP   meloxicam (MOBIC) 7.5 MG tablet TAKE 1 TABLET DAILY 5/2/22  Yes GORDO Zamarripa CNP   Respiratory Therapy Supplies MISC CPAP Mask and Hose Size medium 9/16/19  Yes GORDO Zamarripa CNP   blood glucose monitor kit and supplies Check daily 12/4/18  Yes GORDO Zamarripa CNP   blood glucose monitor strips Check daily 12/4/18  Yes GORDO Zamarripa CNP   Lancets MISC Check blood sugar daily 12/4/18  Yes GORDO Zamarripa CNP        Allergies:       Sulfa antibiotics and Percocet [oxycodone-acetaminophen]    Social History:     Tobacco:    reports that she has never smoked. She has never used smokeless tobacco.  Alcohol:      reports no history of alcohol use.  Drug Use:  reports no history of drug use.    Family History:     Family History   Problem Relation Age of Onset    Cancer Father         lung       Review of Systems:         Review of Systems   Constitutional:  Negative for chills and fever.   HENT:  Positive for congestion, ear pain, rhinorrhea, sinus pain and sore throat.    Respiratory:  Positive for cough. Negative for shortness of breath.    Cardiovascular:  Negative for chest pain and palpitations.   Gastrointestinal:  Negative for diarrhea, nausea and vomiting.   Neurological:  Negative for dizziness, seizures and headaches.       Physical Exam:     Vitals:  /60   Pulse 98   Temp 98.6 °F (37 °C) (Oral)   LMP  (LMP Unknown)   SpO2 96%       Physical Exam  Vitals and nursing note reviewed.   Constitutional:       Appearance: She is well-developed.   HENT:      Nose: Rhinorrhea present.      Mouth/Throat:      Mouth: Mucous membranes are moist.      Pharynx: Posterior oropharyngeal erythema present. No oropharyngeal exudate.   Cardiovascular:      Rate and Rhythm: Normal rate and regular rhythm.   Pulmonary:      Effort: Pulmonary effort is normal. No respiratory distress.      Breath sounds: Normal breath sounds.   Skin:     General: Skin is warm and dry.   Neurological:      Mental Status: She is alert and oriented to person, place, and time.             Data:     Lab Results   Component Value Date/Time     11/10/2021 04:24 PM    K 4.0 11/10/2021 04:24 PM    CL 97 11/10/2021 04:24 PM    CO2 28 11/10/2021 04:24 PM    BUN 20 11/10/2021 04:24 PM    CREATININE 0.92 11/10/2021 04:24 PM    GLUCOSE 89 11/10/2021 04:24 PM    PROT 7.6 12/09/2018 07:38 AM    LABALBU 3.9 12/09/2018 07:38 AM    BILITOT 0.35 12/09/2018 07:38 AM    ALKPHOS 94 12/09/2018 07:38 AM    AST 21 12/09/2018 07:38 AM    ALT 25 12/09/2018 07:38 AM     Lab Results   Component  Value Date/Time    WBC 7.7 11/10/2021 04:24 PM    RBC 4.73 11/10/2021 04:24 PM    HGB 14.1 11/10/2021 04:24 PM    HCT 41.4 11/10/2021 04:24 PM    MCV 87.5 11/10/2021 04:24 PM    MCH 29.7 11/10/2021 04:24 PM    MCHC 34.0 11/10/2021 04:24 PM    RDW 14.3 11/10/2021 04:24 PM     11/10/2021 04:24 PM    MPV NOT REPORTED 11/10/2021 04:24 PM     Lab Results   Component Value Date/Time    TSH 0.17 12/27/2022 11:43 AM     Lab Results   Component Value Date/Time    CHOL 191 10/15/2020 12:00 AM    HDL 44 10/15/2020 12:00 AM    LABA1C 6.6 12/12/2022 08:30 AM          Assessment & Plan        Diagnosis Orders   1. Acute non-recurrent maxillary sinusitis   ---will treat with doxycycline. 2. PND (post-nasal drip)   --continue flonase and zyrtec       3. Globus sensation   --no wheezing or other indication of airway obstruction. Most likely soft tissue swelling from drainage or tylenol this AM. Cool drinks to reduce esophageal irritation. Patient verbalizes understanding and agreement with plan. All questions answered. If symptoms do not resolve or worsen, return to office. Completed Refills   Requested Prescriptions     Signed Prescriptions Disp Refills    doxycycline hyclate (VIBRA-TABS) 100 MG tablet 14 tablet 0     Sig: Take 1 tablet by mouth 2 times daily for 7 days    fluconazole (DIFLUCAN) 150 MG tablet 1 tablet 0     Sig: Take 1 tablet by mouth once for 1 dose     No follow-ups on file. Orders Placed This Encounter   Medications    doxycycline hyclate (VIBRA-TABS) 100 MG tablet     Sig: Take 1 tablet by mouth 2 times daily for 7 days     Dispense:  14 tablet     Refill:  0    fluconazole (DIFLUCAN) 150 MG tablet     Sig: Take 1 tablet by mouth once for 1 dose     Dispense:  1 tablet     Refill:  0     No orders of the defined types were placed in this encounter.         Patient Instructions   SURVEY:    You may be receiving a survey from [employer] regarding your visit today.    Please complete the survey to enable us to provide the highest quality of care to you and your family. If you cannot score us a very good (5 Stars) on any question, please call the office to discuss how we could have made your experience a very good one. Thank you.     Clinical Care Team: REGINE Lewis LPN    Clerical Team: Naty 11   Electronically signed by GORDO Lewis CNP on 1/3/2023 at 7:09 AM           Completed Refills   Requested Prescriptions     Signed Prescriptions Disp Refills    doxycycline hyclate (VIBRA-TABS) 100 MG tablet 14 tablet 0     Sig: Take 1 tablet by mouth 2 times daily for 7 days    fluconazole (DIFLUCAN) 150 MG tablet 1 tablet 0     Sig: Take 1 tablet by mouth once for 1 dose

## 2023-01-03 NOTE — PATIENT INSTRUCTIONS
SURVEY:    You may be receiving a survey from [employer] regarding your visit today. Please complete the survey to enable us to provide the highest quality of care to you and your family. If you cannot score us a very good (5 Stars) on any question, please call the office to discuss how we could have made your experience a very good one. Thank you.     Clinical Care Team: GORDO Rico-CLAU Aiken LPN    Clerical Team: Ericka Miles

## 2023-01-04 ENCOUNTER — TELEPHONE (OUTPATIENT)
Dept: FAMILY MEDICINE CLINIC | Age: 63
End: 2023-01-04

## 2023-01-04 NOTE — TELEPHONE ENCOUNTER
Sore throat generally comes from postnasal drip irritating the mucous membranes in the throat.   She can try the following:      Flonase 1 spray each nostril twice daily (nasal steroid)  Zyrtec 10mg Daily OR Claritin 10mg Daily (antihistamine)  Ibuprofen 3 times a day as needed (antiinflammatory)  Warm tea with 1tbsp honey (soothes the throat)  Increase water intake  Rest  Chloraseptic Throat spray

## 2023-01-04 NOTE — TELEPHONE ENCOUNTER
Patient calling this morning because she is still having swelling in her throat - states the medication is helping her sinuses but still feels as if she can't eat because of the swelling in her throat - asking if something could be called in to [facility] - patient asking for Edie Cheese to call her with suggestions or to let her know if something was called in    Health Maintenance   Topic Date Due    Pneumococcal 0-64 years Vaccine (1 - PCV) Never done    HIV screen  Never done    Hepatitis C screen  Never done    DTaP/Tdap/Td vaccine (1 - Tdap) Never done    Shingles vaccine (1 of 2) Never done    Diabetic retinal exam  01/21/2021    COVID-19 Vaccine (3 - Booster for Pfizer series) 06/03/2021    Lipids  10/15/2021    Cervical cancer screen  12/11/2021    Flu vaccine (1) Never done    GFR test (Diabetes, CKD 3-4, OR last GFR 15-59)  11/10/2022    Diabetic foot exam  12/12/2023    A1C test (Diabetic or Prediabetic)  12/12/2023    Diabetic Alb to Cr ratio (uACR) test  12/20/2023    Depression Screen  01/03/2024    Breast cancer screen  12/27/2024    Colorectal Cancer Screen  07/15/2031    Hepatitis A vaccine  Aged Out    Hib vaccine  Aged Out    Meningococcal (ACWY) vaccine  Aged Out             (applicable per patient's age: Cancer Screenings, Depression Screening, Fall Risk Screening, Immunizations)    Hemoglobin A1C (%)   Date Value   12/12/2022 6.6   04/05/2022 6.0   01/04/2022 5.3     LDL Cholesterol (mg/dL)   Date Value   12/09/2018 87     LDL Calculated (mg/dL)   Date Value   10/15/2020 108     AST (U/L)   Date Value   12/09/2018 21     ALT (U/L)   Date Value   12/09/2018 25     BUN (mg/dL)   Date Value   11/10/2021 20      (goal A1C is < 7)   (goal LDL is <100) need 30-50% reduction from baseline     BP Readings from Last 3 Encounters:   01/03/23 124/60   12/20/22 120/72   12/12/22 122/76    (goal /80)      All Future Testing planned in [facility]:  Lab Frequency Next Occurrence   HM PAP SMEAR Once 01/19/2023 TSH With Reflex Ft4 Once 02/08/2023       Next Visit Date:  Future Appointments   Date Time Provider Kimberley Vicki   3/13/2023  8:20 AM GORDO Littlejohn - [facility]            Patient Active Problem List:     S/P knee surgery     Type 2 diabetes mellitus without complication, without long-term current use of insulin ([address]     H/O sinus tachycardia     NASIR (obstructive sleep apnea)     Postablative hypothyroidism

## 2023-01-04 NOTE — TELEPHONE ENCOUNTER
Patient notified of Tanner's recommendations. Patient voices understanding. Patient is taking mobic daily, told her not to take ibuprofen and mobic together. Patient voices understanding.

## 2023-02-15 ENCOUNTER — HOSPITAL ENCOUNTER (OUTPATIENT)
Age: 63
Discharge: HOME | End: 2023-02-15
Payer: COMMERCIAL

## 2023-02-15 DIAGNOSIS — E89.0 POSTABLATIVE HYPOTHYROIDISM: ICD-10-CM

## 2023-02-15 LAB — TSH SERPL-ACNC: 2.66 UIU/ML (ref 0.3–5)

## 2023-02-15 PROCEDURE — 84443 ASSAY THYROID STIM HORMONE: CPT

## 2023-02-15 PROCEDURE — 36415 COLL VENOUS BLD VENIPUNCTURE: CPT

## 2023-03-13 ENCOUNTER — OFFICE VISIT (OUTPATIENT)
Dept: FAMILY MEDICINE CLINIC | Age: 63
End: 2023-03-13
Payer: COMMERCIAL

## 2023-03-13 VITALS
OXYGEN SATURATION: 97 % | SYSTOLIC BLOOD PRESSURE: 122 MMHG | TEMPERATURE: 97.6 F | BODY MASS INDEX: 39.15 KG/M2 | DIASTOLIC BLOOD PRESSURE: 84 MMHG | HEART RATE: 68 BPM | WEIGHT: 221 LBS

## 2023-03-13 DIAGNOSIS — E89.0 POSTABLATIVE HYPOTHYROIDISM: ICD-10-CM

## 2023-03-13 DIAGNOSIS — H44.811 EYE HEMORRHAGE, RIGHT: ICD-10-CM

## 2023-03-13 DIAGNOSIS — E11.9 TYPE 2 DIABETES MELLITUS WITHOUT COMPLICATION, WITHOUT LONG-TERM CURRENT USE OF INSULIN (HCC): Primary | ICD-10-CM

## 2023-03-13 LAB — HBA1C MFR BLD: 7.1 %

## 2023-03-13 PROCEDURE — 83036 HEMOGLOBIN GLYCOSYLATED A1C: CPT | Performed by: NURSE PRACTITIONER

## 2023-03-13 PROCEDURE — 99214 OFFICE O/P EST MOD 30 MIN: CPT | Performed by: NURSE PRACTITIONER

## 2023-03-13 PROCEDURE — 3051F HG A1C>EQUAL 7.0%<8.0%: CPT | Performed by: NURSE PRACTITIONER

## 2023-03-13 RX ORDER — LEVOTHYROXINE SODIUM 175 UG/1
175 TABLET ORAL
Qty: 26 TABLET | Refills: 0 | Status: SHIPPED | OUTPATIENT
Start: 2023-03-13

## 2023-03-13 RX ORDER — METFORMIN HYDROCHLORIDE 500 MG/1
500 TABLET, EXTENDED RELEASE ORAL 2 TIMES DAILY WITH MEALS
Qty: 180 TABLET | Refills: 0 | Status: SHIPPED | OUTPATIENT
Start: 2023-03-13

## 2023-03-13 SDOH — ECONOMIC STABILITY: FOOD INSECURITY: WITHIN THE PAST 12 MONTHS, YOU WORRIED THAT YOUR FOOD WOULD RUN OUT BEFORE YOU GOT MONEY TO BUY MORE.: NEVER TRUE

## 2023-03-13 SDOH — ECONOMIC STABILITY: INCOME INSECURITY: HOW HARD IS IT FOR YOU TO PAY FOR THE VERY BASICS LIKE FOOD, HOUSING, MEDICAL CARE, AND HEATING?: NOT HARD AT ALL

## 2023-03-13 SDOH — ECONOMIC STABILITY: FOOD INSECURITY: WITHIN THE PAST 12 MONTHS, THE FOOD YOU BOUGHT JUST DIDN'T LAST AND YOU DIDN'T HAVE MONEY TO GET MORE.: NEVER TRUE

## 2023-03-13 SDOH — ECONOMIC STABILITY: HOUSING INSECURITY
IN THE LAST 12 MONTHS, WAS THERE A TIME WHEN YOU DID NOT HAVE A STEADY PLACE TO SLEEP OR SLEPT IN A SHELTER (INCLUDING NOW)?: NO

## 2023-03-13 ASSESSMENT — ENCOUNTER SYMPTOMS
SHORTNESS OF BREATH: 0
EYE REDNESS: 1
EYE ITCHING: 0
VOMITING: 0
NAUSEA: 0
EYE PAIN: 0
COUGH: 0
EYE DISCHARGE: 0
DIARRHEA: 0

## 2023-03-13 NOTE — PROGRESS NOTES
HPI Notes    Name: Rogelio Santana  : 1960         Chief Complaint:     Chief Complaint   Patient presents with    Diabetes Mellitus     Last A1C was 6.6 in      Eye Problem     Patient complains of right eye being red, watery. Started 3 days ago    Hypothyroidism     Patient would like to discuss her thyroid. She said she just doesn't feel well.       History of Present Illness:        Eye Problem   The right eye is affected. This is a new problem. The current episode started in the past 7 days. The problem occurs constantly. There was no injury mechanism. There is No known exposure to pink eye. Associated symptoms include eye redness. Pertinent negatives include no eye discharge, fever, itching, nausea or vomiting.   Diabetes  She presents for her follow-up diabetic visit. She has type 2 diabetes mellitus. Her disease course has been stable. There are no hypoglycemic associated symptoms. Pertinent negatives for hypoglycemia include no dizziness, headaches or seizures. Pertinent negatives for diabetes include no chest pain. Symptoms are stable. Risk factors for coronary artery disease include diabetes mellitus. Current diabetic treatment includes diet and oral agent (monotherapy). She is compliant with treatment all of the time. Her weight is stable. She is following a generally healthy diet.     Hypothyroidism  Pt complains of weight gain, fatigue, and feeling emotionally labile. States \"I know it's my thyroid\". Most recent TSH = 2.66. pt states when she was going to endocrinology they kept her on the \"high normal\" and she felt better.     Past Medical History:     Past Medical History:   Diagnosis Date    Arthritis     Diabetes mellitus (HCC)     Graves disease     s/p radioactive iodine due to tachycardia ablation followed by hypothyroidism    Hypothyroidism     Morbid obesity with BMI of 45.0-49.9, adult (HCC)     Tachycardia     Water retention       Reviewed all health maintenance  requirements and ordered appropriate tests  Health Maintenance Due   Topic Date Due    Pneumococcal 0-64 years Vaccine (1 - PCV) Never done    HIV screen  Never done    Hepatitis C screen  Never done    DTaP/Tdap/Td vaccine (1 - Tdap) Never done    Shingles vaccine (1 of 2) Never done    COVID-19 Vaccine (3 - Booster for Pfizer series) 06/03/2021    Lipids  10/15/2021    Flu vaccine (1) Never done    GFR test (Diabetes, CKD 3-4, OR last GFR 15-59)  11/10/2022       Past Surgical History:     Past Surgical History:   Procedure Laterality Date    APPENDECTOMY      BREAST LUMPECTOMY Left 2011    CHOLECYSTECTOMY      COLONOSCOPY  2011    COLONOSCOPY  07/15/2021    COLONOSCOPY N/A 7/15/2021    COLORECTAL CANCER SCREENING, NOT HIGH RISK performed by Nahomy Lee DO at [address] [place] [address]    radioactive iodine thyroid ablation due to Grave's disease    HI ARTHRP KNE CONDYLE&PLATU MEDIAL&LAT COMPARTMENTS Left 9/11/2018    KNEE TOTAL ARTHROPLASTY performed by Yolanda Lao MD at [address] [place] [address] Left 09/11/2018    Dr. Hernan Parekh        Medications:       Prior to Admission medications    Medication Sig Start Date End Date Taking?  Authorizing Provider   metFORMIN (GLUCOPHAGE-XR) 500 MG extended release tablet Take 1 tablet by mouth 2 times daily (with meals) 3/13/23  Yes GORDO Adames CNP   levothyroxine (SYNTHROID) 175 MCG tablet Take 1 tablet by mouth Twice a Week Monday and Thursday 3/13/23  Yes GORDO Adames CNP   dilTIAZem (CARDIZEM CD) 180 MG extended release capsule Take 1 capsule by mouth daily 12/12/22  Yes GORDO Adames CNP   furosemide (LASIX) 20 MG tablet Take 1 tablet by mouth daily TAKE 1 TABLET BY MOUTH EVERY DAY 12/12/22  Yes GORDO Adames CNP   meloxicam (MOBIC) 7.5 MG tablet TAKE 1 TABLET DAILY 5/2/22  Yes GORDO Adames CNP   Respiratory Therapy Supplies MISC CPAP Mask and Hose Size medium 9/16/19  Yes Estefania Alvarez Bear Swartz CNP   blood glucose monitor kit and supplies Check daily 12/4/18  Yes GORDO Thomas CNP   blood glucose monitor strips Check daily 12/4/18  Yes GORDO Thomas CNP   Lancets MISC Check blood sugar daily 12/4/18  Yes GORDO Thomas CNP        Allergies:       Sulfa antibiotics and Percocet [oxycodone-acetaminophen]    Social History:     Tobacco:    reports that she has never smoked. She has never used smokeless tobacco.  Alcohol:      reports no history of alcohol use. Drug Use:  reports no history of drug use. Family History:     Family History   Problem Relation Age of Onset    Cancer Father         lung       Review of Systems:         Review of Systems   Constitutional:  Negative for chills and fever. Eyes:  Positive for redness. Negative for pain, discharge and itching. Respiratory:  Negative for cough and shortness of breath. Cardiovascular:  Negative for chest pain and palpitations. Gastrointestinal:  Negative for diarrhea, nausea and vomiting. Neurological:  Negative for dizziness, seizures and headaches. Physical Exam:     Vitals:  /84   Pulse 68   Temp 97.6 °F (36.4 °C) (Oral)   Wt 221 lb (100.2 kg)   LMP  (LMP Unknown)   SpO2 97%   BMI 39.15 kg/m²       Physical Exam  Vitals and nursing note reviewed. Constitutional:       Appearance: Normal appearance. She is well-developed. Eyes:      Conjunctiva/sclera:      Right eye: Right conjunctiva is injected. Neck:      Thyroid: No thyromegaly or thyroid tenderness. Cardiovascular:      Rate and Rhythm: Normal rate and regular rhythm. Heart sounds: Normal heart sounds, S1 normal and S2 normal.   Pulmonary:      Effort: Pulmonary effort is normal. No respiratory distress. Breath sounds: Normal breath sounds. Abdominal:      General: Bowel sounds are normal.      Palpations: Abdomen is soft. Tenderness: There is no abdominal tenderness.    Skin: General: Skin is warm and dry. Neurological:      Mental Status: She is alert and oriented to person, place, and time. Data:     Lab Results   Component Value Date/Time     11/10/2021 04:24 PM    K 4.0 11/10/2021 04:24 PM    CL 97 11/10/2021 04:24 PM    CO2 28 11/10/2021 04:24 PM    BUN 20 11/10/2021 04:24 PM    CREATININE 0.92 11/10/2021 04:24 PM    GLUCOSE 89 11/10/2021 04:24 PM    PROT 7.6 12/09/2018 07:38 AM    LABALBU 3.9 12/09/2018 07:38 AM    BILITOT 0.35 12/09/2018 07:38 AM    ALKPHOS 94 12/09/2018 07:38 AM    AST 21 12/09/2018 07:38 AM    ALT 25 12/09/2018 07:38 AM     Lab Results   Component Value Date/Time    WBC 7.7 11/10/2021 04:24 PM    RBC 4.73 11/10/2021 04:24 PM    HGB 14.1 11/10/2021 04:24 PM    HCT 41.4 11/10/2021 04:24 PM    MCV 87.5 11/10/2021 04:24 PM    MCH 29.7 11/10/2021 04:24 PM    MCHC 34.0 11/10/2021 04:24 PM    RDW 14.3 11/10/2021 04:24 PM     11/10/2021 04:24 PM    MPV NOT REPORTED 11/10/2021 04:24 PM     Lab Results   Component Value Date/Time    TSH 2.66 02/15/2023 02:03 PM     Lab Results   Component Value Date/Time    CHOL 191 10/15/2020 12:00 AM    HDL 44 10/15/2020 12:00 AM    LABA1C 7.1 03/13/2023 08:30 AM          Assessment & Plan        Diagnosis Orders   1. Type 2 diabetes mellitus without complication, without long-term current use of insulin (HCC)   --A1c=7.1% (previously 6.6%). Will increase metformin 500mg to twice daily. Continue diet control. POCT glycosylated hemoglobin (Hb A1C)    metFORMIN (GLUCOPHAGE-XR) 500 MG extended release tablet      2. Postablative hypothyroidism   --will increase synthroid to 175mcg twice weekly on Monday and Thursday. 150mcg daily all other days. TSH with Reflex      3. Eye hemorrhage, right   --burst blood vessel from coughing or sneezing. Pt given reassurance that it will reabsorb. Patient verbalizes understanding and agreement with plan. All questions answered.  If symptoms do not resolve or worsen, return to office. Completed Refills   Requested Prescriptions     Signed Prescriptions Disp Refills    metFORMIN (GLUCOPHAGE-XR) 500 MG extended release tablet 180 tablet 0     Sig: Take 1 tablet by mouth 2 times daily (with meals)    levothyroxine (SYNTHROID) 175 MCG tablet 26 tablet 0     Sig: Take 1 tablet by mouth Twice a Week Monday and Thursday     No follow-ups on file. Orders Placed This Encounter   Medications    metFORMIN (GLUCOPHAGE-XR) 500 MG extended release tablet     Sig: Take 1 tablet by mouth 2 times daily (with meals)     Dispense:  180 tablet     Refill:  0    levothyroxine (SYNTHROID) 175 MCG tablet     Sig: Take 1 tablet by mouth Twice a Week Monday and Thursday     Dispense:  26 tablet     Refill:  0     Orders Placed This Encounter   Procedures    TSH with Reflex     Standing Status:   Future     Standing Expiration Date:   3/13/2024    POCT glycosylated hemoglobin (Hb A1C)         Patient Instructions   SURVEY:    You may be receiving a survey from [employer] regarding your visit today. Please complete the survey to enable us to provide the highest quality of care to you and your family. If you cannot score us a very good (5 Stars) on any question, please call the office to discuss how we could have made your experience a very good one. Thank you.     Clinical Care Team: REGINE Grey LPN    Clerical Team: Naty Anderson   Electronically signed by GORDO Grey CNP on 3/13/2023 at 9:35 AM           Completed Refills   Requested Prescriptions     Signed Prescriptions Disp Refills    metFORMIN (GLUCOPHAGE-XR) 500 MG extended release tablet 180 tablet 0     Sig: Take 1 tablet by mouth 2 times daily (with meals)    levothyroxine (SYNTHROID) 175 MCG tablet 26 tablet 0     Sig: Take 1 tablet by mouth Twice a Week Monday and Thursday

## 2023-03-13 NOTE — PATIENT INSTRUCTIONS
SURVEY:    You may be receiving a survey from [facility] [employer] regarding your visit today.    Please complete the survey to enable us to provide the highest quality of care to you and your family.    If you cannot score us a very good (5 Stars) on any question, please call the office to discuss how we could have made your experience a very good one.    Thank you.    Clinical Care Team: GORDO Carrillo-CLAU Ashley LPN    Clerical Team: Agata Kaba

## 2023-04-25 ENCOUNTER — HOSPITAL ENCOUNTER (OUTPATIENT)
Age: 63
Discharge: HOME | End: 2023-04-25
Payer: COMMERCIAL

## 2023-04-25 DIAGNOSIS — E89.0 POSTABLATIVE HYPOTHYROIDISM: ICD-10-CM

## 2023-04-25 LAB — TSH SERPL-ACNC: 4.06 UIU/ML (ref 0.3–5)

## 2023-04-25 PROCEDURE — 36415 COLL VENOUS BLD VENIPUNCTURE: CPT

## 2023-04-25 PROCEDURE — 84443 ASSAY THYROID STIM HORMONE: CPT

## 2023-04-25 RX ORDER — MELOXICAM 7.5 MG/1
TABLET ORAL
Qty: 90 TABLET | Refills: 3 | Status: SHIPPED | OUTPATIENT
Start: 2023-04-25

## 2023-04-25 NOTE — TELEPHONE ENCOUNTER
Last OV 3/13/23 for DM,hypothyroid  Requesting refill on meloxicam thru [facility]  Next OV 6/20/23

## 2023-04-27 RX ORDER — LEVOTHYROXINE SODIUM 175 UG/1
175 TABLET ORAL
Qty: 26 TABLET | Refills: 0 | Status: SHIPPED | OUTPATIENT
Start: 2023-04-27

## 2023-04-27 NOTE — TELEPHONE ENCOUNTER
Last OV: 3/13/2023 chronic   Last RX:    Next scheduled apt: 6/20/2023  3 month DM         Pt requesting a refill to [facility]

## 2023-05-30 ENCOUNTER — OFFICE VISIT (OUTPATIENT)
Dept: FAMILY MEDICINE CLINIC | Age: 63
End: 2023-05-30
Payer: COMMERCIAL

## 2023-05-30 ENCOUNTER — HOSPITAL ENCOUNTER (OUTPATIENT)
Dept: PREADMISSION TESTING | Age: 63
Discharge: HOME | End: 2023-06-03
Payer: COMMERCIAL

## 2023-05-30 VITALS
SYSTOLIC BLOOD PRESSURE: 120 MMHG | WEIGHT: 230 LBS | BODY MASS INDEX: 40.74 KG/M2 | OXYGEN SATURATION: 97 % | DIASTOLIC BLOOD PRESSURE: 76 MMHG | TEMPERATURE: 97.9 F | HEART RATE: 82 BPM

## 2023-05-30 VITALS
WEIGHT: 231.6 LBS | HEIGHT: 62 IN | TEMPERATURE: 97.3 F | OXYGEN SATURATION: 97 % | BODY MASS INDEX: 42.62 KG/M2 | HEART RATE: 89 BPM | RESPIRATION RATE: 16 BRPM | SYSTOLIC BLOOD PRESSURE: 164 MMHG | DIASTOLIC BLOOD PRESSURE: 83 MMHG

## 2023-05-30 DIAGNOSIS — E89.0 POSTABLATIVE HYPOTHYROIDISM: ICD-10-CM

## 2023-05-30 DIAGNOSIS — Z01.818 PREOP EXAMINATION: Primary | ICD-10-CM

## 2023-05-30 LAB
ALBUMIN SERPL-MCNC: 4.7 G/DL (ref 3.5–5.2)
ALBUMIN/GLOB SERPL: 1.3 {RATIO} (ref 1–2.5)
ALP SERPL-CCNC: 103 U/L (ref 35–104)
ALT SERPL-CCNC: 13 U/L (ref 5–33)
ANION GAP SERPL CALCULATED.3IONS-SCNC: 12 MMOL/L (ref 9–17)
AST SERPL-CCNC: 13 U/L
BASOPHILS # BLD: 0.06 K/UL (ref 0–0.2)
BASOPHILS NFR BLD: 1 % (ref 0–2)
BILIRUB SERPL-MCNC: 0.4 MG/DL (ref 0.3–1.2)
BUN SERPL-MCNC: 17 MG/DL (ref 8–23)
BUN/CREAT SERPL: 23 (ref 9–20)
CALCIUM SERPL-MCNC: 9.7 MG/DL (ref 8.6–10.4)
CHLORIDE SERPL-SCNC: 101 MMOL/L (ref 98–107)
CO2 SERPL-SCNC: 28 MMOL/L (ref 20–31)
CREAT SERPL-MCNC: 0.74 MG/DL (ref 0.5–0.9)
EKG ATRIAL RATE: 83 BPM
EKG P AXIS: 9 DEGREES
EKG P-R INTERVAL: 182 MS
EKG Q-T INTERVAL: 394 MS
EKG QRS DURATION: 94 MS
EKG QTC CALCULATION (BAZETT): 462 MS
EKG R AXIS: 23 DEGREES
EKG T AXIS: 10 DEGREES
EKG VENTRICULAR RATE: 83 BPM
EOSINOPHIL # BLD: 0.2 K/UL (ref 0–0.44)
EOSINOPHILS RELATIVE PERCENT: 3 % (ref 1–4)
ERYTHROCYTE [DISTWIDTH] IN BLOOD BY AUTOMATED COUNT: 12.6 % (ref 11.8–14.4)
EST. AVERAGE GLUCOSE BLD GHB EST-MCNC: 157 MG/DL
GFR SERPL CREATININE-BSD FRML MDRD: >60 ML/MIN/1.73M2
GLUCOSE SERPL-MCNC: 132 MG/DL (ref 70–99)
HBA1C MFR BLD: 7.1 % (ref 4–6)
HCT VFR BLD AUTO: 44.8 % (ref 36.3–47.1)
HGB BLD-MCNC: 14.7 G/DL (ref 11.9–15.1)
IMM GRANULOCYTES # BLD AUTO: <0.03 K/UL (ref 0–0.3)
IMM GRANULOCYTES NFR BLD: 0 %
LYMPHOCYTES # BLD: 39 % (ref 24–43)
LYMPHOCYTES NFR BLD: 2.91 K/UL (ref 1.1–3.7)
MCH RBC QN AUTO: 30.3 PG (ref 25.2–33.5)
MCHC RBC AUTO-ENTMCNC: 32.8 G/DL (ref 28.4–34.8)
MCV RBC AUTO: 92.4 FL (ref 82.6–102.9)
MONOCYTES NFR BLD: 0.41 K/UL (ref 0.1–1.2)
MONOCYTES NFR BLD: 6 % (ref 3–12)
NEUTROPHILS NFR BLD: 51 % (ref 36–65)
NEUTS SEG NFR BLD: 3.91 K/UL (ref 1.5–8.1)
NRBC AUTOMATED: 0 PER 100 WBC
PLATELET # BLD AUTO: 254 K/UL (ref 138–453)
PMV BLD AUTO: 10.3 FL (ref 8.1–13.5)
POTASSIUM SERPL-SCNC: 4.6 MMOL/L (ref 3.7–5.3)
PROT SERPL-MCNC: 8.2 G/DL (ref 6.4–8.3)
RBC # BLD AUTO: 4.85 M/UL (ref 3.95–5.11)
SODIUM SERPL-SCNC: 141 MMOL/L (ref 135–144)
WBC OTHER # BLD: 7.5 K/UL (ref 3.5–11.3)

## 2023-05-30 PROCEDURE — 87641 MR-STAPH DNA AMP PROBE: CPT

## 2023-05-30 PROCEDURE — 93005 ELECTROCARDIOGRAM TRACING: CPT | Performed by: ORTHOPAEDIC SURGERY

## 2023-05-30 PROCEDURE — 85025 COMPLETE CBC W/AUTO DIFF WBC: CPT

## 2023-05-30 PROCEDURE — 80053 COMPREHEN METABOLIC PANEL: CPT

## 2023-05-30 PROCEDURE — 99213 OFFICE O/P EST LOW 20 MIN: CPT | Performed by: NURSE PRACTITIONER

## 2023-05-30 PROCEDURE — 83036 HEMOGLOBIN GLYCOSYLATED A1C: CPT

## 2023-05-30 PROCEDURE — 36415 COLL VENOUS BLD VENIPUNCTURE: CPT

## 2023-05-30 PROCEDURE — 93010 ELECTROCARDIOGRAM REPORT: CPT | Performed by: INTERNAL MEDICINE

## 2023-05-30 RX ORDER — LEVOTHYROXINE SODIUM 0.15 MG/1
150 TABLET ORAL DAILY
COMMUNITY

## 2023-05-30 ASSESSMENT — ENCOUNTER SYMPTOMS
DIARRHEA: 0
CONSTIPATION: 0
BACK PAIN: 0
ABDOMINAL PAIN: 0
BLOOD IN STOOL: 0
VOMITING: 0
SORE THROAT: 0
COUGH: 0
NAUSEA: 0
SHORTNESS OF BREATH: 0

## 2023-05-30 ASSESSMENT — PAIN DESCRIPTION - LOCATION: LOCATION: KNEE

## 2023-05-30 ASSESSMENT — PAIN DESCRIPTION - FREQUENCY: FREQUENCY: CONTINUOUS

## 2023-05-30 ASSESSMENT — PAIN SCALES - GENERAL: PAINLEVEL_OUTOF10: 10

## 2023-05-30 ASSESSMENT — PAIN DESCRIPTION - ORIENTATION: ORIENTATION: RIGHT

## 2023-05-30 ASSESSMENT — PAIN DESCRIPTION - PAIN TYPE: TYPE: CHRONIC PAIN

## 2023-05-30 NOTE — PROGRESS NOTES
motion. Cervical back: Normal range of motion. Skin:     General: Skin is warm and dry. Findings: No rash. Neurological:      Mental Status: She is alert and oriented to person, place, and time. GCS: GCS eye subscore is 4. GCS verbal subscore is 5. GCS motor subscore is 6. Deep Tendon Reflexes: Reflexes are normal and symmetric. Psychiatric:         Speech: Speech normal.         Behavior: Behavior normal.         Thought Content: Thought content normal.         Judgment: Judgment normal.             Data:     Lab Results   Component Value Date/Time     11/10/2021 04:24 PM    K 4.0 11/10/2021 04:24 PM    CL 97 11/10/2021 04:24 PM    CO2 28 11/10/2021 04:24 PM    BUN 20 11/10/2021 04:24 PM    CREATININE 0.92 11/10/2021 04:24 PM    GLUCOSE 89 11/10/2021 04:24 PM    PROT 7.6 12/09/2018 07:38 AM    LABALBU 3.9 12/09/2018 07:38 AM    BILITOT 0.35 12/09/2018 07:38 AM    ALKPHOS 94 12/09/2018 07:38 AM    AST 21 12/09/2018 07:38 AM    ALT 25 12/09/2018 07:38 AM     Lab Results   Component Value Date/Time    WBC 7.7 11/10/2021 04:24 PM    RBC 4.73 11/10/2021 04:24 PM    HGB 14.1 11/10/2021 04:24 PM    HCT 41.4 11/10/2021 04:24 PM    MCV 87.5 11/10/2021 04:24 PM    MCH 29.7 11/10/2021 04:24 PM    MCHC 34.0 11/10/2021 04:24 PM    RDW 14.3 11/10/2021 04:24 PM     11/10/2021 04:24 PM    MPV NOT REPORTED 11/10/2021 04:24 PM     Lab Results   Component Value Date/Time    TSH 4.06 04/25/2023 03:05 PM     Lab Results   Component Value Date/Time    CHOL 191 10/15/2020 12:00 AM    HDL 44 10/15/2020 12:00 AM    LABA1C 7.1 03/13/2023 08:30 AM          Assessment & Plan        Diagnosis Orders   1. Preop examination  --Physical exam completed prior to surgical procedure. Pt medical history, current medications, labs, EKG, CXR reviewed. Pt is acceptable risk for surgical procedure.  As with any surgery, there are risks and ultimately it is the decision of the surgeon to proceed with the surgery or

## 2023-05-30 NOTE — PROGRESS NOTES
[facility]   Preadmission Testing    Name: Rajani Parrish  : 1960  Patient Phone: [phone] (home) [phone] (work)    Procedure Right Total Knee Arthroplasty   Date of Procedure: 2023  Surgeon: No att. providers found    Ht:  5' 2.25\" (158.1 cm)  Wt: 231 lb 9.6 oz (105.1 kg)  Wt method: Actual    Allergies: Allergies   Allergen Reactions    Sulfa Antibiotics Swelling     Throat swells     Percocet [Oxycodone-Acetaminophen] Nausea And Vomiting       Peanut allergy: No         Vitals:    23 0923   BP: (!) 164/83   Pulse: 89   Resp: 16   Temp: 97.3 °F (36.3 °C)   SpO2: 97%       No LMP recorded (lmp unknown). Patient is postmenopausal.    Do you take blood thinners? [] Yes    [x] No         Instructed to stop blood thinners prior to procedure? [x] Yes    [] No      [] N/A   Do you have sleep apnea? [x] Yes    [] No     Instructed to bring CPAP machine? [x] Yes    [] No    [] N/A   Do you have acid reflux ? [] Yes    [x] No     Do you have  hiatal hernia? [] Yes    [x] No    Do you ever experience motion sickness? [] Yes    [x] No     Have you had a respiratory infection or sore throat in last 4 weeks before surgery? [] Yes    [x] No     Do you have poorly controlled asthma or COPD? [] Yes    [x] No     Do you have a history of angina in the last month or symptomatic arrhythmia? [] Yes    [x] No     Do you have significant central nervous system disease? [] Yes    [x] No     Have you had an EKG, labs, or chest xray in last 12 months? If yes provide copies to anesthesia   [x] Yes    [x] No       [x] Lab    [] EKG    [] CXR     Have you had a stress test?     [x] Yes    [] No    When/where:  [facility] > 10 years ago     Was it normal?    [x] Yes    [] No   Do you or your family have a history of Malignant Hyperthermia?  [] Yes    [x] No     Patient instructed on:   [x] NPO Status   [x] Meds to Take Day of Surgery  [x] [address]  [x]No Jewelry/Contact

## 2023-05-31 LAB
MRSA, DNA, NASAL: NEGATIVE
SPECIMEN DESCRIPTION: NORMAL

## 2023-06-05 RX ORDER — LEVOTHYROXINE SODIUM 0.15 MG/1
TABLET ORAL
Qty: 60 TABLET | Refills: 1 | Status: SHIPPED | OUTPATIENT
Start: 2023-06-05

## 2023-06-13 PROBLEM — M17.11 PRIMARY OSTEOARTHRITIS OF RIGHT KNEE: Status: ACTIVE | Noted: 2023-06-13

## 2023-07-17 RX ORDER — DILTIAZEM HYDROCHLORIDE 180 MG/1
180 CAPSULE, COATED, EXTENDED RELEASE ORAL DAILY
Qty: 90 CAPSULE | Refills: 1 | Status: SHIPPED | OUTPATIENT
Start: 2023-07-17

## 2023-07-17 RX ORDER — LEVOTHYROXINE SODIUM 175 UG/1
175 TABLET ORAL
Qty: 26 TABLET | Refills: 0 | Status: SHIPPED | OUTPATIENT
Start: 2023-07-17

## 2023-07-17 NOTE — TELEPHONE ENCOUNTER
Last OV: 5/30/2023  pre op 03/13/23 chronic   Last RX:    Next scheduled apt: 8/30/2023   chronic            [facility] requesting a refill

## 2023-08-23 RX ORDER — LEVOTHYROXINE SODIUM 175 UG/1
TABLET ORAL
Qty: 24 TABLET | Refills: 0 | Status: SHIPPED | OUTPATIENT
Start: 2023-08-23

## 2023-09-01 ENCOUNTER — OFFICE VISIT (OUTPATIENT)
Dept: FAMILY MEDICINE CLINIC | Age: 63
End: 2023-09-01
Payer: COMMERCIAL

## 2023-09-01 VITALS
WEIGHT: 235 LBS | OXYGEN SATURATION: 96 % | HEART RATE: 74 BPM | BODY MASS INDEX: 42.98 KG/M2 | SYSTOLIC BLOOD PRESSURE: 130 MMHG | DIASTOLIC BLOOD PRESSURE: 84 MMHG

## 2023-09-01 DIAGNOSIS — E11.9 TYPE 2 DIABETES MELLITUS WITHOUT COMPLICATION, WITHOUT LONG-TERM CURRENT USE OF INSULIN (HCC): Primary | ICD-10-CM

## 2023-09-01 DIAGNOSIS — E89.0 POSTABLATIVE HYPOTHYROIDISM: ICD-10-CM

## 2023-09-01 DIAGNOSIS — R53.83 FATIGUE, UNSPECIFIED TYPE: ICD-10-CM

## 2023-09-01 DIAGNOSIS — Z13.220 SCREENING FOR HYPERLIPIDEMIA: ICD-10-CM

## 2023-09-01 LAB — HBA1C MFR BLD: 8.4 %

## 2023-09-01 PROCEDURE — 3052F HG A1C>EQUAL 8.0%<EQUAL 9.0%: CPT | Performed by: NURSE PRACTITIONER

## 2023-09-01 PROCEDURE — 99213 OFFICE O/P EST LOW 20 MIN: CPT | Performed by: NURSE PRACTITIONER

## 2023-09-01 PROCEDURE — 83036 HEMOGLOBIN GLYCOSYLATED A1C: CPT | Performed by: NURSE PRACTITIONER

## 2023-09-01 RX ORDER — GLIPIZIDE 2.5 MG/1
2.5 TABLET, EXTENDED RELEASE ORAL DAILY
Qty: 30 TABLET | Refills: 2 | Status: SHIPPED | OUTPATIENT
Start: 2023-09-01

## 2023-09-01 RX ORDER — FUROSEMIDE 20 MG/1
20 TABLET ORAL DAILY
Qty: 90 TABLET | Refills: 1 | Status: SHIPPED | OUTPATIENT
Start: 2023-09-01

## 2023-09-01 ASSESSMENT — ENCOUNTER SYMPTOMS
SHORTNESS OF BREATH: 0
COUGH: 0
NAUSEA: 0
VOMITING: 0
DIARRHEA: 0

## 2023-09-01 NOTE — PROGRESS NOTES
3 months (around 12/1/2023) for DM. Orders Placed This Encounter   Medications    furosemide (LASIX) 20 MG tablet     Sig: Take 1 tablet by mouth daily TAKE 1 TABLET BY MOUTH EVERY DAY     Dispense:  90 tablet     Refill:  1    glipiZIDE (GLUCOTROL XL) 2.5 MG extended release tablet     Sig: Take 1 tablet by mouth daily     Dispense:  30 tablet     Refill:  2     Orders Placed This Encounter   Procedures    Lipid Panel     Standing Status:   Future     Standing Expiration Date:   10/5/2024    TSH with Reflex     Standing Status:   Future     Standing Expiration Date:   9/1/2024    Vitamin D 25 Hydroxy     Standing Status:   Future     Standing Expiration Date:   9/1/2024    POCT glycosylated hemoglobin (Hb A1C)         There are no Patient Instructions on file for this visit. Electronically signed by Sierra Ross  on 9/1/2023 at 9:21 AM           Completed Refills   Requested Prescriptions     Signed Prescriptions Disp Refills    furosemide (LASIX) 20 MG tablet 90 tablet 1     Sig: Take 1 tablet by mouth daily TAKE 1 TABLET BY MOUTH EVERY DAY    glipiZIDE (GLUCOTROL XL) 2.5 MG extended release tablet 30 tablet 2     Sig: Take 1 tablet by mouth daily

## 2023-09-04 ENCOUNTER — HOSPITAL ENCOUNTER (OUTPATIENT)
Age: 63
Discharge: HOME | End: 2023-09-04
Payer: COMMERCIAL

## 2023-09-04 DIAGNOSIS — R53.83 FATIGUE, UNSPECIFIED TYPE: ICD-10-CM

## 2023-09-04 DIAGNOSIS — Z13.220 SCREENING FOR HYPERLIPIDEMIA: ICD-10-CM

## 2023-09-04 DIAGNOSIS — E89.0 POSTABLATIVE HYPOTHYROIDISM: ICD-10-CM

## 2023-09-04 LAB
25(OH)D3 SERPL-MCNC: 20.2 NG/ML
CHOLEST SERPL-MCNC: 221 MG/DL
CHOLESTEROL/HDL RATIO: 5
HDLC SERPL-MCNC: 44 MG/DL
LDLC SERPL CALC-MCNC: 148 MG/DL (ref 0–130)
TRIGL SERPL-MCNC: 145 MG/DL
TSH SERPL DL<=0.05 MIU/L-ACNC: 2.84 UIU/ML (ref 0.3–5)

## 2023-09-04 PROCEDURE — 82306 VITAMIN D 25 HYDROXY: CPT

## 2023-09-04 PROCEDURE — 80061 LIPID PANEL: CPT

## 2023-09-04 PROCEDURE — 84443 ASSAY THYROID STIM HORMONE: CPT

## 2023-09-04 PROCEDURE — 36415 COLL VENOUS BLD VENIPUNCTURE: CPT

## 2023-09-05 ENCOUNTER — TELEPHONE (OUTPATIENT)
Dept: FAMILY MEDICINE CLINIC | Age: 63
End: 2023-09-05

## 2023-09-05 RX ORDER — ERGOCALCIFEROL 1.25 MG/1
50000 CAPSULE ORAL WEEKLY
Qty: 5 CAPSULE | Refills: 3 | Status: SHIPPED | OUTPATIENT
Start: 2023-09-05

## 2023-09-05 RX ORDER — ERGOCALCIFEROL 1.25 MG/1
50000 CAPSULE ORAL WEEKLY
COMMUNITY
End: 2023-09-05 | Stop reason: SDUPTHER

## 2023-09-05 NOTE — TELEPHONE ENCOUNTER
Patient notified of lab results and recommendations. She will take the vitamin d weekly, she does not want to do a statin at this time. She will try and watch her diet.     Rx pending for vitamin d weekly

## 2023-09-05 NOTE — TELEPHONE ENCOUNTER
----- Message from GORDO Jennings CNP sent at 9/5/2023  7:11 AM EDT -----  Please let patient know her vitamin D level is low at 20. I would recommend that she take the once a week preparation of 50,000 units. Her cholesterol is elevated at 221 with an LDL of 148. Her ASCVD risk is 12.77%. I would recommend that she start a atorvastatin 80 mg p.o. daily. Please pend if she is willing. TSH was good at 2.84.

## 2023-11-22 RX ORDER — GLIPIZIDE 2.5 MG/1
2.5 TABLET, EXTENDED RELEASE ORAL DAILY
Qty: 30 TABLET | Refills: 2 | Status: SHIPPED | OUTPATIENT
Start: 2023-11-22

## 2023-11-22 NOTE — TELEPHONE ENCOUNTER
Last OV 9/1/23 for DM  A1C 8.4 on 9/1/23  Requesting refill on glyburide thru [facility]  Next OV 12/4/23

## 2023-11-27 RX ORDER — LEVOTHYROXINE SODIUM 0.15 MG/1
TABLET ORAL
Qty: 60 TABLET | Refills: 1 | Status: SHIPPED | OUTPATIENT
Start: 2023-11-27

## 2023-12-05 ENCOUNTER — OFFICE VISIT (OUTPATIENT)
Dept: FAMILY MEDICINE CLINIC | Age: 63
End: 2023-12-05
Payer: COMMERCIAL

## 2023-12-05 VITALS
HEIGHT: 64 IN | SYSTOLIC BLOOD PRESSURE: 118 MMHG | OXYGEN SATURATION: 96 % | WEIGHT: 239 LBS | BODY MASS INDEX: 40.8 KG/M2 | HEART RATE: 58 BPM | DIASTOLIC BLOOD PRESSURE: 62 MMHG

## 2023-12-05 DIAGNOSIS — E11.9 TYPE 2 DIABETES MELLITUS WITHOUT COMPLICATION, WITHOUT LONG-TERM CURRENT USE OF INSULIN (HCC): Primary | ICD-10-CM

## 2023-12-05 PROCEDURE — 3052F HG A1C>EQUAL 8.0%<EQUAL 9.0%: CPT | Performed by: NURSE PRACTITIONER

## 2023-12-05 PROCEDURE — 99213 OFFICE O/P EST LOW 20 MIN: CPT | Performed by: NURSE PRACTITIONER

## 2023-12-05 ASSESSMENT — ENCOUNTER SYMPTOMS
SHORTNESS OF BREATH: 0
VOMITING: 0
DIARRHEA: 0
NAUSEA: 0
COUGH: 0

## 2023-12-05 NOTE — PROGRESS NOTES
07/15/2021    COLONOSCOPY N/A 7/15/2021    COLORECTAL CANCER SCREENING, NOT HIGH RISK performed by Cata Bean DO at [address]    radioactive iodine thyroid ablation due to Grave's disease    PA ARTHRP KNE CONDYLE&PLATU MEDIAL&LAT COMPARTMENTS Left 9/11/2018    KNEE TOTAL ARTHROPLASTY performed by Wang Sibley MD at [facility] Left 09/11/2018    Dr. Joshua Hatfield Right 6/13/2023    KNEE TOTAL ARTHROPLASTY performed by Wang Sibley MD at [address] [place] [address]        Medications:       Prior to Admission medications    Medication Sig Start Date End Date Taking?  Authorizing Provider   levothyroxine (SYNTHROID) 150 MCG tablet Take Sunday, Tuesday, Wednesday, Friday and Saturday 11/27/23  Yes GORDO Nielson CNP   glipiZIDE (GLUCOTROL XL) 2.5 MG extended release tablet TAKE 1 TABLET BY MOUTH DAILY 11/22/23  Yes GORDO Nielson CNP   vitamin D (ERGOCALCIFEROL) 1.25 MG (57868 UT) CAPS capsule Take 1 capsule by mouth once a week 9/5/23  Yes GORDO Nielson CNP   furosemide (LASIX) 20 MG tablet Take 1 tablet by mouth daily TAKE 1 TABLET BY MOUTH EVERY DAY 9/1/23  Yes Melania Mccray APRN - CNP   levothyroxine (SYNTHROID) 175 MCG tablet TAKE 1 TABLET BY MOUTH TWICE A WEEK on monday and thursday 8/23/23  Yes Melania Mccray APRN - CNP   dilTIAZem (CARDIZEM CD) 180 MG extended release capsule TAKE 1 CAPSULE BY MOUTH DAILY 7/17/23  Yes Melania Mccray APRN - CNP   ondansetron (ZOFRAN) 4 MG tablet Take 1 tablet by mouth 3 times daily as needed for Nausea or Vomiting 6/14/23  Yes GORDO Small CNP   meloxicam (MOBIC) 7.5 MG tablet TAKE 1 TABLET BY MOUTH DAILY 4/25/23  Yes Melania Mccray APRN - CNP   metFORMIN (GLUCOPHAGE-XR) 500 MG extended release tablet Take 1 tablet by mouth 2 times daily (with meals) 3/13/23  Yes GORDO Nielson CNP   blood glucose monitor kit and supplies Check daily

## 2023-12-27 ENCOUNTER — TELEPHONE (OUTPATIENT)
Dept: FAMILY MEDICINE CLINIC | Age: 63
End: 2023-12-27

## 2023-12-27 DIAGNOSIS — Z12.31 VISIT FOR SCREENING MAMMOGRAM: Primary | ICD-10-CM

## 2023-12-27 NOTE — TELEPHONE ENCOUNTER
----- Message from Nicolas Muhammad sent at 12/27/2023  3:50 PM EST -----  Subject: Referral Request    Reason for referral request? mammo  Provider patient wants to be referred to(if known):     Provider Phone Number(if known): Additional Information for Provider?  please call pt when orders are made   so she can schedule  ---------------------------------------------------------------------------  --------------  Sara VAUGHN    [phone]; OK to leave message on voicemail  ---------------------------------------------------------------------------  --------------

## 2024-01-02 RX ORDER — LEVOTHYROXINE SODIUM 175 UG/1
TABLET ORAL
Qty: 24 TABLET | Refills: 0 | Status: SHIPPED | OUTPATIENT
Start: 2024-01-02

## 2024-01-02 NOTE — TELEPHONE ENCOUNTER
Last OV 09/01/2023 for chronics     Requesting refill on Levothyroxine thru [facility].  RX pending     Next OV 01/11/24

## 2024-01-08 ENCOUNTER — HOSPITAL ENCOUNTER (OUTPATIENT)
Dept: MAMMOGRAPHY | Age: 64
Discharge: HOME | End: 2024-01-10
Payer: COMMERCIAL

## 2024-01-08 DIAGNOSIS — Z12.31 VISIT FOR SCREENING MAMMOGRAM: ICD-10-CM

## 2024-01-08 PROCEDURE — 77063 BREAST TOMOSYNTHESIS BI: CPT

## 2024-01-08 RX ORDER — DILTIAZEM HYDROCHLORIDE 180 MG/1
180 CAPSULE, COATED, EXTENDED RELEASE ORAL DAILY
Qty: 90 CAPSULE | Refills: 1 | Status: SHIPPED | OUTPATIENT
Start: 2024-01-08

## 2024-01-08 NOTE — TELEPHONE ENCOUNTER
Last OV 12/05/23    Requesting refill on Diltiazem thru [facility].   Rx pending     Next OV 03/11/24

## 2024-01-23 RX ORDER — ERGOCALCIFEROL 1.25 MG/1
50000 CAPSULE ORAL WEEKLY
Qty: 5 CAPSULE | Refills: 3 | Status: SHIPPED | OUTPATIENT
Start: 2024-01-23

## 2024-01-23 NOTE — TELEPHONE ENCOUNTER
Last OV 12/5/23    Requesting refill on vitamin D thru [facility].  Rx pending     Next OV 03/11/24

## 2024-02-16 RX ORDER — MELOXICAM 7.5 MG/1
TABLET ORAL
Qty: 180 TABLET | Refills: 3 | Status: SHIPPED | OUTPATIENT
Start: 2024-02-16

## 2024-02-16 NOTE — TELEPHONE ENCOUNTER
Next Occurrence       Next Visit Date:  Future Appointments   Date Time Provider Department Center   3/11/2024  8:00 AM Maldonado Mccray DNP [facility]            Patient Active Problem List:     S/P knee surgery     Type 2 diabetes mellitus without complication, without long-term current use of insulin (HCC)     H/O sinus tachycardia     NASIR (obstructive sleep apnea)     Postablative hypothyroidism     Primary osteoarthritis of right knee

## 2024-02-21 RX ORDER — GLIPIZIDE 2.5 MG/1
2.5 TABLET, EXTENDED RELEASE ORAL DAILY
Qty: 30 TABLET | Refills: 2 | OUTPATIENT
Start: 2024-02-21

## 2024-02-21 RX ORDER — FUROSEMIDE 20 MG/1
20 TABLET ORAL DAILY
Qty: 90 TABLET | Refills: 2 | OUTPATIENT
Start: 2024-02-21

## 2024-02-22 RX ORDER — FUROSEMIDE 20 MG/1
20 TABLET ORAL DAILY
Qty: 90 TABLET | Refills: 1 | OUTPATIENT
Start: 2024-02-22

## 2024-02-22 RX ORDER — GLIPIZIDE 2.5 MG/1
2.5 TABLET, EXTENDED RELEASE ORAL DAILY
Qty: 30 TABLET | Refills: 1 | OUTPATIENT
Start: 2024-02-22

## 2024-02-22 NOTE — TELEPHONE ENCOUNTER
Last OV 12/05/23 DM     Requesting refill on furosemide and lasix.  Rx pending     Next OV 03/11/24

## 2024-02-23 RX ORDER — FUROSEMIDE 20 MG/1
20 TABLET ORAL DAILY
Qty: 90 TABLET | Refills: 1 | Status: SHIPPED | OUTPATIENT
Start: 2024-02-23

## 2024-02-23 RX ORDER — GLIPIZIDE 2.5 MG/1
2.5 TABLET, EXTENDED RELEASE ORAL DAILY
Qty: 30 TABLET | Refills: 2 | Status: SHIPPED | OUTPATIENT
Start: 2024-02-23

## 2024-02-23 NOTE — TELEPHONE ENCOUNTER
Last OV: 12/5/2023  DM   Last RX:    Next scheduled apt: 03/11/24 3 month           Pt requesting a refill            
Provider Department Center   3/11/2024  8:00 AM Maldonado Mccray DNP [facility]            Patient Active Problem List:     S/P knee surgery     Type 2 diabetes mellitus without complication, without long-term current use of insulin (HCC)     H/O sinus tachycardia     NASIR (obstructive sleep apnea)     Postablative hypothyroidism     Primary osteoarthritis of right knee

## 2024-03-04 ENCOUNTER — NURSE ONLY (OUTPATIENT)
Dept: FAMILY MEDICINE CLINIC | Age: 64
End: 2024-03-04

## 2024-03-04 VITALS — DIASTOLIC BLOOD PRESSURE: 68 MMHG | SYSTOLIC BLOOD PRESSURE: 128 MMHG | HEART RATE: 89 BPM | OXYGEN SATURATION: 98 %

## 2024-03-04 DIAGNOSIS — I10 HYPERTENSION, UNSPECIFIED TYPE: Primary | ICD-10-CM

## 2024-03-04 PROCEDURE — 99999 PR OFFICE/OUTPT VISIT,PROCEDURE ONLY: CPT | Performed by: NURSE PRACTITIONER

## 2024-03-11 ENCOUNTER — OFFICE VISIT (OUTPATIENT)
Dept: FAMILY MEDICINE CLINIC | Age: 64
End: 2024-03-11
Payer: COMMERCIAL

## 2024-03-11 ENCOUNTER — TELEPHONE (OUTPATIENT)
Dept: FAMILY MEDICINE CLINIC | Age: 64
End: 2024-03-11

## 2024-03-11 VITALS
WEIGHT: 249 LBS | HEART RATE: 81 BPM | BODY MASS INDEX: 44.12 KG/M2 | SYSTOLIC BLOOD PRESSURE: 132 MMHG | DIASTOLIC BLOOD PRESSURE: 82 MMHG | HEIGHT: 63 IN | OXYGEN SATURATION: 97 %

## 2024-03-11 DIAGNOSIS — E11.9 TYPE 2 DIABETES MELLITUS WITHOUT COMPLICATION, WITHOUT LONG-TERM CURRENT USE OF INSULIN (HCC): Primary | ICD-10-CM

## 2024-03-11 LAB — HBA1C MFR BLD: 7.5 %

## 2024-03-11 PROCEDURE — 99213 OFFICE O/P EST LOW 20 MIN: CPT | Performed by: NURSE PRACTITIONER

## 2024-03-11 PROCEDURE — 83036 HEMOGLOBIN GLYCOSYLATED A1C: CPT | Performed by: NURSE PRACTITIONER

## 2024-03-11 PROCEDURE — 3051F HG A1C>EQUAL 7.0%<8.0%: CPT | Performed by: NURSE PRACTITIONER

## 2024-03-11 ASSESSMENT — PATIENT HEALTH QUESTIONNAIRE - PHQ9
SUM OF ALL RESPONSES TO PHQ QUESTIONS 1-9: 0
2. FEELING DOWN, DEPRESSED OR HOPELESS: 0
1. LITTLE INTEREST OR PLEASURE IN DOING THINGS: 0
SUM OF ALL RESPONSES TO PHQ QUESTIONS 1-9: 0
SUM OF ALL RESPONSES TO PHQ9 QUESTIONS 1 & 2: 0
SUM OF ALL RESPONSES TO PHQ QUESTIONS 1-9: 0
SUM OF ALL RESPONSES TO PHQ QUESTIONS 1-9: 0

## 2024-03-11 ASSESSMENT — ENCOUNTER SYMPTOMS
VOMITING: 0
SHORTNESS OF BREATH: 0
COUGH: 0
NAUSEA: 0
DIARRHEA: 0

## 2024-03-11 NOTE — PROGRESS NOTES
encounter.    Orders Placed This Encounter   Procedures    POCT glycosylated hemoglobin (Hb A1C)         Patient Instructions     SURVEY:    You may be receiving a survey from [facility] your [facility] today.    Please complete the survey to enable us to provide the highest quality of care to you and your family.    If you cannot score us a very good on any question, please call the office to discuss how we could have made your experience a very good one.    Thank you.     Electronically signed by Maldonado Mccray DNP,APRN,CNP  on 3/11/2024 at 8:14 AM           Completed Refills   Requested Prescriptions      No prescriptions requested or ordered in this encounter

## 2024-03-11 NOTE — TELEPHONE ENCOUNTER
Rogelio said she called her insurance regarding the mounjaro. Her insurance if requiring a PA. They gave a reference # below.    Ref # [id number]    Health Maintenance   Topic Date Due    Pneumococcal 0-64 years Vaccine (1 - PCV) Never done    HIV screen  Never done    Hepatitis C screen  Never done    DTaP/Tdap/Td vaccine (1 - Tdap) Never done    Shingles vaccine (1 of 2) Never done    Respiratory Syncytial Virus (RSV) Pregnant or age 60 yrs+ (1 - 1-dose 60+ series) Never done    Flu vaccine (1) Never done    COVID-19 Vaccine (3 - 2023-24 season) 09/01/2023    Diabetic foot exam  12/12/2023    Diabetic Alb to Cr ratio (uACR) test  12/20/2023    Diabetic retinal exam  02/20/2024    GFR test (Diabetes, CKD 3-4, OR last GFR 15-59)  06/14/2024    Lipids  09/04/2024    A1C test (Diabetic or Prediabetic)  03/11/2025    Depression Screen  03/11/2025    Cervical cancer screen  12/20/2025    Breast cancer screen  01/08/2026    Colorectal Cancer Screen  07/15/2031    Hepatitis A vaccine  Aged Out    Hepatitis B vaccine  Aged Out    Hib vaccine  Aged Out    Polio vaccine  Aged Out    Meningococcal (ACWY) vaccine  Aged Out             (applicable per patient's age: Cancer Screenings, Depression Screening, Fall Risk Screening, Immunizations)    Hemoglobin A1C (%)   Date Value   03/11/2024 7.5   09/01/2023 8.4   05/30/2023 7.1 (H)     LDL Cholesterol (mg/dL)   Date Value   09/04/2023 148 (H)     LDL Calculated (mg/dL)   Date Value   10/15/2020 108     AST (U/L)   Date Value   05/30/2023 13     ALT (U/L)   Date Value   05/30/2023 13     BUN (mg/dL)   Date Value   06/14/2023 19      (goal A1C is < 7)   (goal LDL is <100) need 30-50% reduction from baseline     BP Readings from Last 3 Encounters:   03/11/24 132/82   03/04/24 128/68   12/05/23 118/62    (goal /80)      All Future Testing planned in [facility]:  Lab Frequency Next Occurrence       Next Visit Date:  Future Appointments   Date Time Provider Department Center

## 2024-03-19 RX ORDER — LEVOTHYROXINE SODIUM 175 UG/1
TABLET ORAL
Qty: 24 TABLET | Refills: 0 | Status: SHIPPED | OUTPATIENT
Start: 2024-03-19

## 2024-03-19 RX ORDER — GLIPIZIDE 5 MG/1
5 TABLET, FILM COATED, EXTENDED RELEASE ORAL DAILY
Qty: 30 TABLET | Refills: 3 | Status: SHIPPED | OUTPATIENT
Start: 2024-03-19

## 2024-03-19 NOTE — TELEPHONE ENCOUNTER
Last OV: 3/11/2024 chronic   Last RX:    Next scheduled apt: 6/17/2024    3 months DM             Medication pending for approval

## 2024-03-19 NOTE — TELEPHONE ENCOUNTER
Patient states that Tanner was going to increase her Glipizide to 5 mg - it is noted in her plan from visit on 3/11 - please send new script to [facility]

## 2024-03-19 NOTE — TELEPHONE ENCOUNTER
Last OV: 3/11/2024 DM   Last RX:    Next scheduled apt: 6/17/2024  3 months DM         Pt requesting a refill   Medication pending for approval       Removed glipizide 2.5 mg   Added glipizide 5 mg in notes on 03/11/24

## 2024-05-16 RX ORDER — LEVOTHYROXINE SODIUM 0.15 MG/1
TABLET ORAL
Qty: 60 TABLET | Refills: 1 | Status: SHIPPED | OUTPATIENT
Start: 2024-05-16

## 2024-05-16 NOTE — TELEPHONE ENCOUNTER
Last OV: 3/11/2024   09/01/23   thyroid   Last RX:    Next scheduled apt: 6/17/2024   3 months DM           Surescript requesting a refill

## 2024-06-13 ENCOUNTER — OFFICE VISIT (OUTPATIENT)
Dept: FAMILY MEDICINE CLINIC | Age: 64
End: 2024-06-13
Payer: COMMERCIAL

## 2024-06-13 VITALS
DIASTOLIC BLOOD PRESSURE: 78 MMHG | HEIGHT: 63 IN | WEIGHT: 252 LBS | SYSTOLIC BLOOD PRESSURE: 136 MMHG | OXYGEN SATURATION: 97 % | BODY MASS INDEX: 44.65 KG/M2 | HEART RATE: 93 BPM

## 2024-06-13 DIAGNOSIS — E11.9 TYPE 2 DIABETES MELLITUS WITHOUT COMPLICATION, WITHOUT LONG-TERM CURRENT USE OF INSULIN (HCC): Primary | ICD-10-CM

## 2024-06-13 DIAGNOSIS — R09.82 PND (POST-NASAL DRIP): ICD-10-CM

## 2024-06-13 LAB — HBA1C MFR BLD: 6.8 %

## 2024-06-13 PROCEDURE — 83036 HEMOGLOBIN GLYCOSYLATED A1C: CPT | Performed by: NURSE PRACTITIONER

## 2024-06-13 PROCEDURE — 99213 OFFICE O/P EST LOW 20 MIN: CPT | Performed by: NURSE PRACTITIONER

## 2024-06-13 PROCEDURE — 3044F HG A1C LEVEL LT 7.0%: CPT | Performed by: NURSE PRACTITIONER

## 2024-06-13 RX ORDER — CEPHALEXIN 500 MG/1
500 CAPSULE ORAL 2 TIMES DAILY
Qty: 14 CAPSULE | Refills: 0 | Status: SHIPPED | OUTPATIENT
Start: 2024-06-13 | End: 2024-06-20

## 2024-06-13 SDOH — ECONOMIC STABILITY: FOOD INSECURITY: WITHIN THE PAST 12 MONTHS, YOU WORRIED THAT YOUR FOOD WOULD RUN OUT BEFORE YOU GOT MONEY TO BUY MORE.: NEVER TRUE

## 2024-06-13 SDOH — ECONOMIC STABILITY: FOOD INSECURITY: WITHIN THE PAST 12 MONTHS, THE FOOD YOU BOUGHT JUST DIDN'T LAST AND YOU DIDN'T HAVE MONEY TO GET MORE.: NEVER TRUE

## 2024-06-13 SDOH — ECONOMIC STABILITY: INCOME INSECURITY: HOW HARD IS IT FOR YOU TO PAY FOR THE VERY BASICS LIKE FOOD, HOUSING, MEDICAL CARE, AND HEATING?: NOT HARD AT ALL

## 2024-06-13 ASSESSMENT — ENCOUNTER SYMPTOMS
DIARRHEA: 0
VOMITING: 0
NAUSEA: 0
SHORTNESS OF BREATH: 0
VISUAL CHANGE: 0
COUGH: 0

## 2024-06-13 NOTE — PROGRESS NOTES
-- A1c 6.8% (previously 7.5%).  Continue glipizide.  Continue diet control. POCT glycosylated hemoglobin (Hb A1C)      2. PND (post-nasal drip)   --Sudafed 60mg 4 times daily (nasal decongestant)  Flonase 2 spray each nostril twice daily (nasal steroid)  Claritin 10mg twice Daily (antihistamine)  Warm tea with 1tbsp honey (soothes the throat)    Pt insistent on an antibiotic. Pt will treat with above plan for 3 days, if not improving get keflex filled.                              Completed Refills   Requested Prescriptions     Signed Prescriptions Disp Refills    cephALEXin (KEFLEX) 500 MG capsule 14 capsule 0     Sig: Take 1 capsule by mouth 2 times daily for 7 days     No follow-ups on file.  Orders Placed This Encounter   Medications    cephALEXin (KEFLEX) 500 MG capsule     Sig: Take 1 capsule by mouth 2 times daily for 7 days     Dispense:  14 capsule     Refill:  0     Orders Placed This Encounter   Procedures    POCT glycosylated hemoglobin (Hb A1C)         Patient Instructions     SURVEY:    You may be receiving a survey from [facility] regarding your visit today.    Please complete the survey to enable us to provide the highest quality of care to you and your family.    If you cannot score us a very good on any question, please call the office to discuss how we could have made your experience a very good one.    Thank you.     Electronically signed by Maldonado Mccray DNP,APRN,CNP  on 6/13/2024 at 3:01 PM           Completed Refills   Requested Prescriptions     Signed Prescriptions Disp Refills    cephALEXin (KEFLEX) 500 MG capsule 14 capsule 0     Sig: Take 1 capsule by mouth 2 times daily for 7 days

## 2024-06-17 RX ORDER — LEVOTHYROXINE SODIUM 175 UG/1
TABLET ORAL
Qty: 24 TABLET | Refills: 0 | Status: SHIPPED | OUTPATIENT
Start: 2024-06-17

## 2024-06-17 RX ORDER — ERGOCALCIFEROL 1.25 MG/1
50000 CAPSULE ORAL WEEKLY
Qty: 5 CAPSULE | Refills: 0 | Status: SHIPPED | OUTPATIENT
Start: 2024-06-17

## 2024-07-09 RX ORDER — GLIPIZIDE 5 MG/1
5 TABLET, FILM COATED, EXTENDED RELEASE ORAL DAILY
Qty: 30 TABLET | Refills: 1 | Status: SHIPPED | OUTPATIENT
Start: 2024-07-09

## 2024-07-09 RX ORDER — DILTIAZEM HYDROCHLORIDE 180 MG/1
180 CAPSULE, COATED, EXTENDED RELEASE ORAL DAILY
Qty: 90 CAPSULE | Refills: 1 | Status: SHIPPED | OUTPATIENT
Start: 2024-07-09

## 2024-07-09 NOTE — TELEPHONE ENCOUNTER
Last OV: 6/13/2024 DM   Last RX:    Next scheduled apt: Visit date not found          Surecript requesting a refill

## 2024-07-30 RX ORDER — ERGOCALCIFEROL 1.25 MG/1
50000 CAPSULE, LIQUID FILLED ORAL WEEKLY
Qty: 5 CAPSULE | Refills: 2 | Status: SHIPPED | OUTPATIENT
Start: 2024-07-30

## 2024-08-15 RX ORDER — FUROSEMIDE 20 MG/1
20 TABLET ORAL DAILY
Qty: 90 TABLET | Refills: 1 | Status: SHIPPED | OUTPATIENT
Start: 2024-08-15

## 2024-08-30 RX ORDER — LEVOTHYROXINE SODIUM 175 UG/1
TABLET ORAL
Qty: 24 TABLET | Refills: 2 | Status: SHIPPED | OUTPATIENT
Start: 2024-08-30

## 2024-08-30 NOTE — TELEPHONE ENCOUNTER
Last OV 6/13/24     Next OV not scheduled    Requesting refill on levothyroxine thru surescripts.  Rx pending

## 2024-09-03 RX ORDER — GLIPIZIDE 5 MG/1
5 TABLET, FILM COATED, EXTENDED RELEASE ORAL DAILY
Qty: 30 TABLET | Refills: 1 | Status: SHIPPED | OUTPATIENT
Start: 2024-09-03

## 2024-10-21 RX ORDER — ERGOCALCIFEROL 1.25 MG/1
50000 CAPSULE, LIQUID FILLED ORAL WEEKLY
Qty: 5 CAPSULE | Refills: 2 | Status: SHIPPED | OUTPATIENT
Start: 2024-10-21

## 2024-10-21 NOTE — TELEPHONE ENCOUNTER
Last OV 6/13/24 acute    Next OV not scheduled    Requesting refill on vitamin D thru surescripts   Rx pending

## 2024-10-28 RX ORDER — LEVOTHYROXINE SODIUM 150 UG/1
TABLET ORAL
Qty: 60 TABLET | Refills: 1 | Status: SHIPPED | OUTPATIENT
Start: 2024-10-28 | End: 2024-10-31 | Stop reason: DRUGHIGH

## 2024-10-28 NOTE — TELEPHONE ENCOUNTER
Last OV 6/13/24    Next OV not scheduled    Requesting refill on levothyroxine thru surescripts   Rx pending

## 2024-10-29 ENCOUNTER — TELEPHONE (OUTPATIENT)
Dept: FAMILY MEDICINE CLINIC | Age: 64
End: 2024-10-29

## 2024-10-29 NOTE — TELEPHONE ENCOUNTER
The core problem with poison ivy is a histamine reaction, so we need to minimize histamine. She can use the following:    Zyrtec 10mg twice daily  Pepcid 20mg twice daily  Anti-itch cream of choice    Avoid reinfection by cleaning all clothes, bed linens, shoes, and shoe laces. Take a shower and wash with moose dish soap or other soap that removes oil immediately after exposure to avoid future rash.

## 2024-10-29 NOTE — TELEPHONE ENCOUNTER
Pt called states she got into poison ivy. Its all over her belly, on her arms, right leg, and into the crease of her private area.   Pt wondering if she can get something called in or if she needs to be seen.     Please advise

## 2024-10-30 ENCOUNTER — TELEPHONE (OUTPATIENT)
Dept: FAMILY MEDICINE CLINIC | Age: 64
End: 2024-10-30

## 2024-10-30 NOTE — TELEPHONE ENCOUNTER
Patient's spouse contacted the office requesting to schedule spouse with Dr. Jauregui for NP Exam bc she is not happy with current PCP.     Writer scheduled the patient for New Patient Appt with first availability on Nov 8 at 1:45 pm.    Patient's spouse went on to advise. Rogelio has current poison Ivy in the groin area. She is not happy with current PCP- Tanner Mccray, because she was advised to keep the area clean and dry.     Caller is questioning if PCP, Can order Kenalog injection for the discomfort. Please advise. Thank you.

## 2024-10-31 ENCOUNTER — OFFICE VISIT (OUTPATIENT)
Dept: FAMILY MEDICINE CLINIC | Age: 64
End: 2024-10-31

## 2024-10-31 VITALS
DIASTOLIC BLOOD PRESSURE: 77 MMHG | RESPIRATION RATE: 18 BRPM | HEART RATE: 104 BPM | OXYGEN SATURATION: 95 % | HEIGHT: 63 IN | BODY MASS INDEX: 45.89 KG/M2 | WEIGHT: 259 LBS | SYSTOLIC BLOOD PRESSURE: 124 MMHG

## 2024-10-31 DIAGNOSIS — L23.7 POISON IVY: Primary | ICD-10-CM

## 2024-10-31 DIAGNOSIS — E03.9 HYPOTHYROIDISM, UNSPECIFIED TYPE: ICD-10-CM

## 2024-10-31 DIAGNOSIS — I10 HYPERTENSION, UNSPECIFIED TYPE: ICD-10-CM

## 2024-10-31 DIAGNOSIS — E11.9 TYPE 2 DIABETES MELLITUS WITHOUT COMPLICATION, WITHOUT LONG-TERM CURRENT USE OF INSULIN (HCC): ICD-10-CM

## 2024-10-31 DIAGNOSIS — E55.9 VITAMIN D DEFICIENCY: ICD-10-CM

## 2024-10-31 RX ORDER — LEVOTHYROXINE SODIUM 175 UG/1
175 TABLET ORAL DAILY
Qty: 90 TABLET | Refills: 1 | Status: SHIPPED | OUTPATIENT
Start: 2024-10-31

## 2024-10-31 RX ORDER — TRIAMCINOLONE ACETONIDE 0.25 MG/G
CREAM TOPICAL
Qty: 80 G | Refills: 0 | Status: SHIPPED | OUTPATIENT
Start: 2024-10-31

## 2024-10-31 RX ORDER — TRIAMCINOLONE ACETONIDE 40 MG/ML
40 INJECTION, SUSPENSION INTRA-ARTICULAR; INTRAMUSCULAR ONCE
Status: COMPLETED | OUTPATIENT
Start: 2024-10-31 | End: 2024-10-31

## 2024-10-31 RX ADMIN — TRIAMCINOLONE ACETONIDE 40 MG: 40 INJECTION, SUSPENSION INTRA-ARTICULAR; INTRAMUSCULAR at 15:18

## 2024-10-31 ASSESSMENT — ENCOUNTER SYMPTOMS
ABDOMINAL PAIN: 0
SORE THROAT: 0
SHORTNESS OF BREATH: 0
COUGH: 0
NAUSEA: 0

## 2024-10-31 NOTE — PROGRESS NOTES
SURVEY:    You may be receiving a survey from [facility] regarding your visit today.    Please complete the survey to enable us to provide the highest quality of care to you and your family.    If you cannot score us a very good on any question, please call the office to discuss how we could have made your experience a very good one.    Thank you.  [facility]  MD Paula Bhatia, MD Steve Lobo, DO Ziggy Santana, MD Monica Lindquist, APRN-CNP  Colette Rehman, Practice Manager  Judi, CMA  Mary, [facility]  Bernardino, CMA  Phoebe, CMA  Hu, [facility]   Priscila, N  Ruchi, [facility]       Electronically signed by Poli Jauregui MD on 10/31/2024 at 3:00 PM           Completed Refills      Requested Prescriptions     Signed Prescriptions Disp Refills    triamcinolone (KENALOG) 0.025 % cream 80 g 0     Sig: Apply topically 2 times daily.    levothyroxine (SYNTHROID) 175 MCG tablet 90 tablet 1     Sig: Take 1 tablet by mouth Daily

## 2024-10-31 NOTE — PATIENT INSTRUCTIONS
SURVEY:    You may be receiving a survey from [facility] regarding your visit today.    Please complete the survey to enable us to provide the highest quality of care to you and your family.    If you cannot score us a very good on any question, please call the office to discuss how we could have made your experience a very good one.    Thank you.  [facility]  MD Paula Bhatia, MD Steve Lobo, DO  Ziggy Santana, MD Monica Lindquist, APRN-CNP  Colette Rehman, Practice Manager  Judi, CMA  Mary, CCMA  Bernardino, CMA  Phoebe, CMA  Hu, PSC   Priscila, LPN  Ruchi, CMA

## 2024-11-01 RX ORDER — GLIPIZIDE 5 MG/1
5 TABLET, FILM COATED, EXTENDED RELEASE ORAL DAILY
Qty: 30 TABLET | Refills: 1 | Status: SHIPPED | OUTPATIENT
Start: 2024-11-01

## 2024-11-01 NOTE — TELEPHONE ENCOUNTER
Health Maintenance   Topic Date Due    Pneumococcal 0-64 years Vaccine (1 of 2 - PCV) Never done    HIV screen  Never done    Hepatitis C screen  Never done    DTaP/Tdap/Td vaccine (1 - Tdap) Never done    Shingles vaccine (1 of 2) Never done    Respiratory Syncytial Virus (RSV) Pregnant or age 60 yrs+ (1 - 1-dose 60+ series) Never done    Diabetic foot exam  12/12/2023    Diabetic Alb to Cr ratio (uACR) test  12/20/2023    Diabetic retinal exam  02/20/2024    GFR test (Diabetes, CKD 3-4, OR last GFR 15-59)  06/14/2024    Flu vaccine (1) Never done    COVID-19 Vaccine (3 - 2023-24 season) 09/01/2024    Lipids  09/04/2024    Depression Screen  03/11/2025    A1C test (Diabetic or Prediabetic)  06/13/2025    Cervical cancer screen  12/20/2025    Breast cancer screen  01/08/2026    Colorectal Cancer Screen  07/15/2031    Hepatitis A vaccine  Aged Out    Hepatitis B vaccine  Aged Out    Hib vaccine  Aged Out    Polio vaccine  Aged Out    Meningococcal (ACWY) vaccine  Aged Out             (applicable per patient's age: Cancer Screenings, Depression Screening, Fall Risk Screening, Immunizations)    Hemoglobin A1C (%)   Date Value   06/13/2024 6.8   03/11/2024 7.5   09/01/2023 8.4     AST (U/L)   Date Value   05/30/2023 13     ALT (U/L)   Date Value   05/30/2023 13     BUN (mg/dL)   Date Value   06/14/2023 19      (goal A1C is < 7)   (goal LDL is <100) need 30-50% reduction from baseline     BP Readings from Last 3 Encounters:   10/31/24 124/77   06/13/24 136/78   03/11/24 132/82    (goal /80)      All Future Testing planned in [facility]:  Lab Frequency Next Occurrence   Lipid Panel Once 10/31/2024   TSH with Reflex Once 10/31/2024   Basic Metabolic Panel Once 10/31/2024   CBC with Auto Differential Once 10/31/2024   Vitamin D 25 Hydroxy Once 10/31/2024   Hemoglobin A1C Once 10/31/2024       Next Visit Date:  No future appointments.         Patient Active Problem List:     S/P knee surgery     Type 2 diabetes mellitus

## 2024-11-02 ENCOUNTER — HOSPITAL ENCOUNTER (OUTPATIENT)
Age: 64
Discharge: HOME | End: 2024-11-02
Payer: COMMERCIAL

## 2024-11-02 DIAGNOSIS — E55.9 VITAMIN D DEFICIENCY: ICD-10-CM

## 2024-11-02 DIAGNOSIS — E03.9 HYPOTHYROIDISM, UNSPECIFIED TYPE: ICD-10-CM

## 2024-11-02 DIAGNOSIS — I10 HYPERTENSION, UNSPECIFIED TYPE: ICD-10-CM

## 2024-11-02 DIAGNOSIS — E11.9 TYPE 2 DIABETES MELLITUS WITHOUT COMPLICATION, WITHOUT LONG-TERM CURRENT USE OF INSULIN (HCC): ICD-10-CM

## 2024-11-02 LAB
25(OH)D3 SERPL-MCNC: 35.7 NG/ML (ref 30–100)
ANION GAP SERPL CALCULATED.3IONS-SCNC: 12 MMOL/L (ref 9–17)
BASOPHILS # BLD: 0.03 K/UL (ref 0–0.2)
BASOPHILS NFR BLD: 0 % (ref 0–2)
BUN SERPL-MCNC: 17 MG/DL (ref 8–23)
BUN/CREAT SERPL: 24 (ref 9–20)
CALCIUM SERPL-MCNC: 9.2 MG/DL (ref 8.6–10.4)
CHLORIDE SERPL-SCNC: 99 MMOL/L (ref 98–107)
CHOLEST SERPL-MCNC: 260 MG/DL (ref 0–199)
CHOLESTEROL/HDL RATIO: 6
CO2 SERPL-SCNC: 23 MMOL/L (ref 20–31)
CREAT SERPL-MCNC: 0.7 MG/DL (ref 0.5–0.9)
EOSINOPHIL # BLD: 0.17 K/UL (ref 0–0.4)
EOSINOPHILS RELATIVE PERCENT: 2 % (ref 0–5)
ERYTHROCYTE [DISTWIDTH] IN BLOOD BY AUTOMATED COUNT: 13 % (ref 12.1–15.2)
EST. AVERAGE GLUCOSE BLD GHB EST-MCNC: 160 MG/DL
GFR, ESTIMATED: >90 ML/MIN/1.73M2
GLUCOSE SERPL-MCNC: 160 MG/DL (ref 70–99)
HBA1C MFR BLD: 7.2 % (ref 4–6)
HCT VFR BLD AUTO: 40.1 % (ref 36–46)
HDLC SERPL-MCNC: 43 MG/DL
HGB BLD-MCNC: 13.8 G/DL (ref 12–16)
IMM GRANULOCYTES # BLD AUTO: 0.02 K/UL (ref 0–0.3)
IMM GRANULOCYTES NFR BLD: 0 % (ref 0–5)
LDLC SERPL CALC-MCNC: 168 MG/DL (ref 0–100)
LYMPHOCYTES NFR BLD: 2.26 K/UL (ref 1–4.8)
LYMPHOCYTES RELATIVE PERCENT: 32 % (ref 15–40)
MCH RBC QN AUTO: 31.2 PG (ref 26–34)
MCHC RBC AUTO-ENTMCNC: 34.4 G/DL (ref 31–37)
MCV RBC AUTO: 90.5 FL (ref 80–100)
MONOCYTES NFR BLD: 0.34 K/UL (ref 0–1)
MONOCYTES NFR BLD: 5 % (ref 4–8)
NEUTROPHILS NFR BLD: 61 % (ref 47–75)
NEUTS SEG NFR BLD: 4.28 K/UL (ref 2.5–7)
PLATELET # BLD AUTO: 215 K/UL (ref 140–450)
PMV BLD AUTO: 10.1 FL (ref 6–12)
POTASSIUM SERPL-SCNC: 4.1 MMOL/L (ref 3.7–5.3)
RBC # BLD AUTO: 4.43 M/UL (ref 4–5.2)
SODIUM SERPL-SCNC: 134 MMOL/L (ref 135–144)
T4 FREE SERPL-MCNC: 1.2 NG/DL (ref 0.9–1.7)
TRIGL SERPL-MCNC: 246 MG/DL
TSH SERPL DL<=0.05 MIU/L-ACNC: 9.96 UIU/ML (ref 0.3–5)
VLDLC SERPL CALC-MCNC: 49 MG/DL (ref 1–30)
WBC OTHER # BLD: 7.1 K/UL (ref 3.5–11)

## 2024-11-02 PROCEDURE — 84443 ASSAY THYROID STIM HORMONE: CPT

## 2024-11-02 PROCEDURE — 84439 ASSAY OF FREE THYROXINE: CPT

## 2024-11-02 PROCEDURE — 83036 HEMOGLOBIN GLYCOSYLATED A1C: CPT

## 2024-11-02 PROCEDURE — 82306 VITAMIN D 25 HYDROXY: CPT

## 2024-11-02 PROCEDURE — 80048 BASIC METABOLIC PNL TOTAL CA: CPT

## 2024-11-02 PROCEDURE — 36415 COLL VENOUS BLD VENIPUNCTURE: CPT

## 2024-11-02 PROCEDURE — 85025 COMPLETE CBC W/AUTO DIFF WBC: CPT

## 2024-11-02 PROCEDURE — 80061 LIPID PANEL: CPT

## 2024-11-11 DIAGNOSIS — E03.9 HYPOTHYROIDISM, UNSPECIFIED TYPE: Primary | ICD-10-CM

## 2024-12-23 ENCOUNTER — HOSPITAL ENCOUNTER (OUTPATIENT)
Age: 64
Discharge: HOME | End: 2024-12-23
Payer: COMMERCIAL

## 2024-12-23 DIAGNOSIS — E03.9 HYPOTHYROIDISM, UNSPECIFIED TYPE: ICD-10-CM

## 2024-12-23 LAB
T4 FREE SERPL-MCNC: 1.5 NG/DL (ref 0.92–1.68)
TSH SERPL DL<=0.05 MIU/L-ACNC: 5.41 UIU/ML (ref 0.3–5)

## 2024-12-23 PROCEDURE — 84439 ASSAY OF FREE THYROXINE: CPT

## 2024-12-23 PROCEDURE — 36415 COLL VENOUS BLD VENIPUNCTURE: CPT

## 2024-12-23 PROCEDURE — 84443 ASSAY THYROID STIM HORMONE: CPT

## 2024-12-30 RX ORDER — GLIPIZIDE 5 MG/1
5 TABLET, FILM COATED, EXTENDED RELEASE ORAL DAILY
Qty: 30 TABLET | Refills: 1 | Status: SHIPPED | OUTPATIENT
Start: 2024-12-30

## 2025-01-06 RX ORDER — DILTIAZEM HYDROCHLORIDE 180 MG/1
180 CAPSULE, COATED, EXTENDED RELEASE ORAL DAILY
Qty: 90 CAPSULE | Refills: 1 | Status: SHIPPED | OUTPATIENT
Start: 2025-01-06

## 2025-01-06 RX ORDER — ERGOCALCIFEROL 1.25 MG/1
50000 CAPSULE, LIQUID FILLED ORAL WEEKLY
Qty: 5 CAPSULE | Refills: 2 | Status: SHIPPED | OUTPATIENT
Start: 2025-01-06

## 2025-01-07 DIAGNOSIS — Z12.31 BREAST CANCER SCREENING BY MAMMOGRAM: Primary | ICD-10-CM

## 2025-01-15 ENCOUNTER — HOSPITAL ENCOUNTER (OUTPATIENT)
Dept: MAMMOGRAPHY | Age: 65
Discharge: HOME | End: 2025-01-17
Attending: INTERNAL MEDICINE
Payer: COMMERCIAL

## 2025-01-15 DIAGNOSIS — Z12.31 BREAST CANCER SCREENING BY MAMMOGRAM: ICD-10-CM

## 2025-01-15 PROCEDURE — 77063 BREAST TOMOSYNTHESIS BI: CPT

## 2025-01-17 RX ORDER — FUROSEMIDE 20 MG/1
20 TABLET ORAL DAILY
Qty: 90 TABLET | Refills: 1 | OUTPATIENT
Start: 2025-01-17

## 2025-01-21 RX ORDER — FUROSEMIDE 20 MG/1
20 TABLET ORAL DAILY
Qty: 90 TABLET | Refills: 1 | Status: SHIPPED | OUTPATIENT
Start: 2025-01-21

## 2025-01-21 RX ORDER — MELOXICAM 7.5 MG/1
TABLET ORAL
Qty: 180 TABLET | Refills: 3 | Status: SHIPPED | OUTPATIENT
Start: 2025-01-21

## 2025-02-03 ENCOUNTER — TELEPHONE (OUTPATIENT)
Dept: FAMILY MEDICINE CLINIC | Age: 65
End: 2025-02-03

## 2025-02-03 RX ORDER — CEPHALEXIN 500 MG/1
500 CAPSULE ORAL 2 TIMES DAILY
Qty: 10 CAPSULE | Refills: 0 | Status: SHIPPED | OUTPATIENT
Start: 2025-02-03 | End: 2025-02-08

## 2025-02-03 NOTE — TELEPHONE ENCOUNTER
Patient phoned in and is on vacation, patient has sun poisoning in R arm and is asking for an RX to be sent in, she states this is common for her and she did not lotion one arm and this is her result. Please advise      [facility]  [id number] [facility]  [id number] [phone]       Skin normal color for race, warm, dry and intact.

## 2025-02-07 DIAGNOSIS — E11.9 TYPE 2 DIABETES MELLITUS WITHOUT COMPLICATION, WITHOUT LONG-TERM CURRENT USE OF INSULIN (HCC): Primary | ICD-10-CM

## 2025-02-27 RX ORDER — ERGOCALCIFEROL 1.25 MG/1
50000 CAPSULE, LIQUID FILLED ORAL WEEKLY
Qty: 5 CAPSULE | Refills: 2 | Status: SHIPPED | OUTPATIENT
Start: 2025-02-27

## 2025-03-14 RX ORDER — GLIPIZIDE 5 MG/1
5 TABLET, FILM COATED, EXTENDED RELEASE ORAL DAILY
Qty: 30 TABLET | Refills: 1 | Status: SHIPPED | OUTPATIENT
Start: 2025-03-14

## 2025-03-25 RX ORDER — AZITHROMYCIN 500 MG/1
500 TABLET, FILM COATED ORAL DAILY
Qty: 5 TABLET | Refills: 0 | Status: SHIPPED | OUTPATIENT
Start: 2025-03-25 | End: 2025-03-30

## 2025-05-06 RX ORDER — ERGOCALCIFEROL 1.25 MG/1
50000 CAPSULE, LIQUID FILLED ORAL WEEKLY
Qty: 5 CAPSULE | Refills: 1 | Status: SHIPPED | OUTPATIENT
Start: 2025-05-06

## 2025-05-07 DIAGNOSIS — E03.9 HYPOTHYROIDISM, UNSPECIFIED TYPE: ICD-10-CM

## 2025-05-07 RX ORDER — LEVOTHYROXINE SODIUM 175 UG/1
175 TABLET ORAL DAILY
Qty: 90 TABLET | Refills: 1 | Status: CANCELLED | OUTPATIENT
Start: 2025-05-07

## 2025-05-07 NOTE — TELEPHONE ENCOUNTER
Health Maintenance   Topic Date Due    HIV screen  Never done    Hepatitis C screen  Never done    DTaP/Tdap/Td vaccine (1 - Tdap) Never done    Pneumococcal 50+ years Vaccine (1 of 2 - PCV) Never done    Shingles vaccine (1 of 2) Never done    Respiratory Syncytial Virus (RSV) Pregnant or age 60 yrs+ (1 - Risk 60-74 years 1-dose series) Never done    Diabetic foot exam  12/12/2023    Diabetic Alb to Cr ratio (uACR) test  12/20/2023    Diabetic retinal exam  02/20/2024    COVID-19 Vaccine (3 - 2024-25 season) 09/01/2024    Depression Screen  03/11/2025    Flu vaccine (Season Ended) 08/01/2025    A1C test (Diabetic or Prediabetic)  11/02/2025    Lipids  11/02/2025    GFR test (Diabetes, CKD 3-4, OR last GFR 15-59)  11/02/2025    Cervical cancer screen  12/20/2025    Breast cancer screen  01/15/2027    Colorectal Cancer Screen  07/15/2031    Hepatitis A vaccine  Aged Out    Hepatitis B vaccine  Aged Out    Hib vaccine  Aged Out    Polio vaccine  Aged Out    Meningococcal (ACWY) vaccine  Aged Out    Meningococcal B vaccine  Aged Out             (applicable per patient's age: Cancer Screenings, Depression Screening, Fall Risk Screening, Immunizations)    Hemoglobin A1C (%)   Date Value   11/02/2024 7.2 (H)   06/13/2024 6.8   03/11/2024 7.5     AST (U/L)   Date Value   05/30/2023 13     ALT (U/L)   Date Value   05/30/2023 13     BUN (mg/dL)   Date Value   11/02/2024 17      (goal A1C is < 7)   (goal LDL is <100) need 30-50% reduction from baseline     BP Readings from Last 3 Encounters:   10/31/24 124/77   06/13/24 136/78   03/11/24 132/82    (goal /80)      All Future Testing planned in [facility]:  Lab Frequency Next Occurrence   Comprehensive Metabolic Panel Once 02/07/2025   Albumin/Creatinine Ratio, Urine Once 02/07/2025       Next Visit Date:  No future appointments.         Patient Active Problem List:     S/P knee surgery     Type 2 diabetes mellitus without complication, without long-term current use of

## 2025-05-08 RX ORDER — LEVOTHYROXINE SODIUM 175 UG/1
175 TABLET ORAL DAILY
Qty: 90 TABLET | Refills: 1 | Status: SHIPPED | OUTPATIENT
Start: 2025-05-08

## 2025-05-13 DIAGNOSIS — E03.9 HYPOTHYROIDISM, UNSPECIFIED TYPE: ICD-10-CM

## 2025-05-13 RX ORDER — LEVOTHYROXINE SODIUM 175 UG/1
175 TABLET ORAL DAILY
Qty: 90 TABLET | Refills: 1 | OUTPATIENT
Start: 2025-05-13

## 2025-05-16 RX ORDER — GLIPIZIDE 5 MG/1
5 TABLET, FILM COATED, EXTENDED RELEASE ORAL DAILY
Qty: 30 TABLET | Refills: 1 | Status: SHIPPED | OUTPATIENT
Start: 2025-05-16 | End: 2025-05-16

## 2025-05-16 RX ORDER — GLIPIZIDE 5 MG/1
5 TABLET, FILM COATED, EXTENDED RELEASE ORAL DAILY
Qty: 30 TABLET | Refills: 0 | Status: SHIPPED | OUTPATIENT
Start: 2025-05-16

## 2025-06-10 RX ORDER — GLIPIZIDE 5 MG/1
5 TABLET, FILM COATED, EXTENDED RELEASE ORAL DAILY
Qty: 30 TABLET | Refills: 0 | Status: SHIPPED | OUTPATIENT
Start: 2025-06-10

## 2025-06-30 ENCOUNTER — TELEPHONE (OUTPATIENT)
Dept: FAMILY MEDICINE CLINIC | Age: 65
End: 2025-06-30

## 2025-06-30 DIAGNOSIS — E03.9 HYPOTHYROIDISM, UNSPECIFIED TYPE: Primary | ICD-10-CM

## 2025-06-30 NOTE — TELEPHONE ENCOUNTER
Patient had called and LVM requesting for PCP to place orders for Thyroid testing. Please advise. Thank you.

## 2025-07-01 ENCOUNTER — HOSPITAL ENCOUNTER (OUTPATIENT)
Age: 65
Discharge: HOME | End: 2025-07-01
Payer: COMMERCIAL

## 2025-07-01 DIAGNOSIS — E11.9 TYPE 2 DIABETES MELLITUS WITHOUT COMPLICATION, WITHOUT LONG-TERM CURRENT USE OF INSULIN (HCC): ICD-10-CM

## 2025-07-01 DIAGNOSIS — E03.9 HYPOTHYROIDISM, UNSPECIFIED TYPE: ICD-10-CM

## 2025-07-01 LAB
ALBUMIN SERPL-MCNC: 4.5 G/DL (ref 3.5–5.2)
ALBUMIN/GLOB SERPL: 1.4 {RATIO} (ref 1–2.5)
ALP SERPL-CCNC: 110 U/L (ref 35–104)
ALT SERPL-CCNC: 18 U/L (ref 5–33)
ANION GAP SERPL CALCULATED.3IONS-SCNC: 10 MMOL/L (ref 9–17)
AST SERPL-CCNC: 21 U/L
BILIRUB SERPL-MCNC: 0.2 MG/DL (ref 0.3–1.2)
BUN SERPL-MCNC: 22 MG/DL (ref 8–23)
CALCIUM SERPL-MCNC: 9.7 MG/DL (ref 8.6–10.4)
CHLORIDE SERPL-SCNC: 103 MMOL/L (ref 98–107)
CO2 SERPL-SCNC: 26 MMOL/L (ref 20–31)
CREAT SERPL-MCNC: 1 MG/DL (ref 0.5–0.9)
CREAT UR-MCNC: 317 MG/DL (ref 28–217)
GFR, ESTIMATED: 63 ML/MIN/1.73M2
GLUCOSE SERPL-MCNC: 111 MG/DL (ref 70–99)
MICROALBUMIN UR-MCNC: 56 MG/L (ref 0–20)
MICROALBUMIN/CREAT UR-RTO: 18 MCG/MG CREAT (ref 0–25)
POTASSIUM SERPL-SCNC: 4.2 MMOL/L (ref 3.7–5.3)
PROT SERPL-MCNC: 7.8 G/DL (ref 6.4–8.3)
SODIUM SERPL-SCNC: 139 MMOL/L (ref 135–144)
T4 FREE SERPL-MCNC: 1.4 NG/DL (ref 0.9–1.7)
TSH SERPL DL<=0.05 MIU/L-ACNC: 5.68 UIU/ML (ref 0.27–4.2)

## 2025-07-01 PROCEDURE — 80053 COMPREHEN METABOLIC PANEL: CPT

## 2025-07-01 PROCEDURE — 84443 ASSAY THYROID STIM HORMONE: CPT

## 2025-07-01 PROCEDURE — 82043 UR ALBUMIN QUANTITATIVE: CPT

## 2025-07-01 PROCEDURE — 84439 ASSAY OF FREE THYROXINE: CPT

## 2025-07-01 PROCEDURE — 82570 ASSAY OF URINE CREATININE: CPT

## 2025-07-01 PROCEDURE — 36415 COLL VENOUS BLD VENIPUNCTURE: CPT

## 2025-07-02 ENCOUNTER — TELEPHONE (OUTPATIENT)
Dept: FAMILY MEDICINE CLINIC | Age: 65
End: 2025-07-02

## 2025-07-02 NOTE — TELEPHONE ENCOUNTER
Patient concerned about TSH lab done yesterday and was wondering if she is suppose to be taking her synthroid medication more often, please advise.

## 2025-07-08 ENCOUNTER — HOSPITAL ENCOUNTER (EMERGENCY)
Age: 65
Discharge: HOME | End: 2025-07-08
Attending: FAMILY MEDICINE
Payer: COMMERCIAL

## 2025-07-08 ENCOUNTER — APPOINTMENT (OUTPATIENT)
Dept: GENERAL RADIOLOGY | Age: 65
End: 2025-07-08
Payer: COMMERCIAL

## 2025-07-08 VITALS
OXYGEN SATURATION: 94 % | RESPIRATION RATE: 19 BRPM | WEIGHT: 259 LBS | DIASTOLIC BLOOD PRESSURE: 92 MMHG | TEMPERATURE: 98.1 F | HEIGHT: 63 IN | SYSTOLIC BLOOD PRESSURE: 171 MMHG | BODY MASS INDEX: 45.89 KG/M2 | HEART RATE: 97 BPM

## 2025-07-08 DIAGNOSIS — R05.2 SUBACUTE COUGH: ICD-10-CM

## 2025-07-08 DIAGNOSIS — R42 DIZZINESS: Primary | ICD-10-CM

## 2025-07-08 LAB
ANION GAP SERPL CALCULATED.3IONS-SCNC: 15 MMOL/L (ref 9–17)
BASOPHILS # BLD: 0.05 K/UL (ref 0–0.2)
BASOPHILS NFR BLD: 1 % (ref 0–2)
BILIRUB UR QL STRIP: NEGATIVE
BUN SERPL-MCNC: 17 MG/DL (ref 8–23)
CALCIUM SERPL-MCNC: 9.2 MG/DL (ref 8.6–10.4)
CHLORIDE SERPL-SCNC: 101 MMOL/L (ref 98–107)
CLARITY UR: CLEAR
CO2 SERPL-SCNC: 22 MMOL/L (ref 20–31)
COLOR UR: YELLOW
COMMENT: NORMAL
CREAT SERPL-MCNC: 0.8 MG/DL (ref 0.5–0.9)
EOSINOPHIL # BLD: 0.12 K/UL (ref 0–0.4)
EOSINOPHILS RELATIVE PERCENT: 2 % (ref 0–5)
ERYTHROCYTE [DISTWIDTH] IN BLOOD BY AUTOMATED COUNT: 13.1 % (ref 12.1–15.2)
GFR, ESTIMATED: 82 ML/MIN/1.73M2
GLUCOSE SERPL-MCNC: 177 MG/DL (ref 70–99)
GLUCOSE UR STRIP-MCNC: NEGATIVE MG/DL
HCT VFR BLD AUTO: 43.8 % (ref 36–46)
HGB BLD-MCNC: 14.7 G/DL (ref 12–16)
HGB UR QL STRIP.AUTO: NEGATIVE
IMM GRANULOCYTES # BLD AUTO: 0.02 K/UL (ref 0–0.3)
IMM GRANULOCYTES NFR BLD: 0 % (ref 0–5)
KETONES UR STRIP-MCNC: NEGATIVE MG/DL
LEUKOCYTE ESTERASE UR QL STRIP: NEGATIVE
LYMPHOCYTES NFR BLD: 2.19 K/UL (ref 1–4.8)
LYMPHOCYTES RELATIVE PERCENT: 31 % (ref 15–40)
MCH RBC QN AUTO: 30 PG (ref 26–34)
MCHC RBC AUTO-ENTMCNC: 33.6 G/DL (ref 31–37)
MCV RBC AUTO: 89.4 FL (ref 80–100)
MONOCYTES NFR BLD: 0.43 K/UL (ref 0–1)
MONOCYTES NFR BLD: 6 % (ref 4–8)
NEUTROPHILS NFR BLD: 60 % (ref 47–75)
NEUTS SEG NFR BLD: 4.19 K/UL (ref 2.5–7)
NITRITE UR QL STRIP: NEGATIVE
PH UR STRIP: 5 [PH] (ref 5–8)
PLATELET # BLD AUTO: 207 K/UL (ref 140–450)
PMV BLD AUTO: 10.3 FL (ref 6–12)
POTASSIUM SERPL-SCNC: 4 MMOL/L (ref 3.7–5.3)
PROT UR STRIP-MCNC: NEGATIVE MG/DL
RBC # BLD AUTO: 4.9 M/UL (ref 4–5.2)
SODIUM SERPL-SCNC: 138 MMOL/L (ref 135–144)
SP GR UR STRIP: 1.01 (ref 1–1.03)
T3FREE SERPL-MCNC: 2.61 PG/ML (ref 2–4.4)
T4 FREE SERPL-MCNC: 1.7 NG/DL (ref 0.92–1.68)
TROPONIN I SERPL HS-MCNC: <6 NG/L (ref 0–14)
TSH SERPL DL<=0.05 MIU/L-ACNC: 5.51 UIU/ML (ref 0.27–4.2)
UROBILINOGEN UR STRIP-ACNC: NORMAL EU/DL (ref 0–1)
WBC OTHER # BLD: 7 K/UL (ref 3.5–11)

## 2025-07-08 PROCEDURE — 84484 ASSAY OF TROPONIN QUANT: CPT

## 2025-07-08 PROCEDURE — 81003 URINALYSIS AUTO W/O SCOPE: CPT

## 2025-07-08 PROCEDURE — 84481 FREE ASSAY (FT-3): CPT

## 2025-07-08 PROCEDURE — 85025 COMPLETE CBC W/AUTO DIFF WBC: CPT

## 2025-07-08 PROCEDURE — 71046 X-RAY EXAM CHEST 2 VIEWS: CPT

## 2025-07-08 PROCEDURE — 80048 BASIC METABOLIC PNL TOTAL CA: CPT

## 2025-07-08 PROCEDURE — 99285 EMERGENCY DEPT VISIT HI MDM: CPT

## 2025-07-08 PROCEDURE — 36415 COLL VENOUS BLD VENIPUNCTURE: CPT

## 2025-07-08 PROCEDURE — 84443 ASSAY THYROID STIM HORMONE: CPT

## 2025-07-08 PROCEDURE — 84439 ASSAY OF FREE THYROXINE: CPT

## 2025-07-08 PROCEDURE — 93005 ELECTROCARDIOGRAM TRACING: CPT | Performed by: FAMILY MEDICINE

## 2025-07-08 ASSESSMENT — LIFESTYLE VARIABLES
HOW MANY STANDARD DRINKS CONTAINING ALCOHOL DO YOU HAVE ON A TYPICAL DAY: PATIENT DOES NOT DRINK
HOW OFTEN DO YOU HAVE A DRINK CONTAINING ALCOHOL: NEVER

## 2025-07-08 ASSESSMENT — PAIN DESCRIPTION - DESCRIPTORS: DESCRIPTORS: ACHING

## 2025-07-08 ASSESSMENT — PAIN DESCRIPTION - LOCATION: LOCATION: HEAD

## 2025-07-08 ASSESSMENT — ENCOUNTER SYMPTOMS
CHEST TIGHTNESS: 0
COUGH: 1

## 2025-07-08 ASSESSMENT — PAIN SCALES - GENERAL: PAINLEVEL_OUTOF10: 4

## 2025-07-08 ASSESSMENT — PAIN DESCRIPTION - PAIN TYPE: TYPE: ACUTE PAIN

## 2025-07-08 ASSESSMENT — PAIN - FUNCTIONAL ASSESSMENT: PAIN_FUNCTIONAL_ASSESSMENT: 0-10

## 2025-07-08 NOTE — ED PROVIDER NOTES
[facility]  EMERGENCY DEPARTMENT ENCOUNTER      Pt Name: Rogelio Santana  MRN: [id number]  Birthdate 1960  Date of evaluation: 7/8/2025  Provider: Alan Molina MD    CHIEF COMPLAINT       Chief Complaint   Patient presents with    Lightheadedness     Stated she feels lightheaded and has throbbing head. Is diabetic.          HISTORY OF PRESENT ILLNESS      Rogelio Santana is a 64 y.o. female who presents to the emergency department via private vehicle, patient complains of cough, onset over the past few weeks, had chest with congestion, denies any fever, cough has been injected with yellow product.  No known sick contacts, patient states she has not taken to allergies she does not get this every year.  Patient indicates she is having slight left numbness on her face, onset today, feels \"foggy\", weak and dizzy, denies any vertigo symptoms.  Denies trauma falls or being struck in the head.  Denies alcohol or drugs.  Patient states compliant on medications.  Patient states she feels that this may be related to her thyroid issues, she states her last thyroid tests were elevated.  Patient states was unable to get all of her PCP issues been on vacation    Per triage nurse, patient initial complaints of cough, on the scale was worried about a stroke due to facial numbness, and then worried about thyroid  once in the room.    PCP: [facility]    REVIEW OF SYSTEMS       Review of Systems   Constitutional:  Negative for fever.   HENT:  Positive for congestion.    Respiratory:  Positive for cough. Negative for chest tightness.    Cardiovascular:  Negative for chest pain, palpitations and leg swelling.   Genitourinary:  Negative for dysuria.   Neurological:  Positive for numbness (left face).   All other systems reviewed and are negative.        PAST MEDICAL HISTORY     Past Medical History:   Diagnosis Date    Arthritis     Diabetes mellitus (HCC)     Graves disease 1986    s/p radioactive iodine due to

## 2025-07-09 ENCOUNTER — TELEPHONE (OUTPATIENT)
Dept: FAMILY MEDICINE CLINIC | Age: 65
End: 2025-07-09

## 2025-07-09 DIAGNOSIS — E03.9 HYPOTHYROIDISM, UNSPECIFIED TYPE: Primary | ICD-10-CM

## 2025-07-09 RX ORDER — LEVOTHYROXINE SODIUM 200 UG/1
200 TABLET ORAL DAILY
Qty: 90 TABLET | Refills: 1 | Status: SHIPPED | OUTPATIENT
Start: 2025-07-09

## 2025-07-09 NOTE — TELEPHONE ENCOUNTER
Patient contacted the office questioning if PCP had updates from phone call from 07/02/25 about increasing her thyroid med after recent labs complete for thyroid testing. She is also questioning if she should resume armour T3/T4 medication which had previously been discontinued by Tanner Mccray NP? Please advise. Thank you.

## 2025-07-10 LAB
EKG ATRIAL RATE: 96 BPM
EKG P AXIS: 54 DEGREES
EKG P-R INTERVAL: 182 MS
EKG Q-T INTERVAL: 368 MS
EKG QRS DURATION: 90 MS
EKG QTC CALCULATION (BAZETT): 464 MS
EKG R AXIS: 42 DEGREES
EKG T AXIS: 45 DEGREES
EKG VENTRICULAR RATE: 96 BPM

## 2025-07-10 PROCEDURE — 93010 ELECTROCARDIOGRAM REPORT: CPT | Performed by: INTERNAL MEDICINE

## 2025-07-10 RX ORDER — GLIPIZIDE 5 MG/1
5 TABLET, FILM COATED, EXTENDED RELEASE ORAL DAILY
Qty: 30 TABLET | Refills: 0 | Status: SHIPPED | OUTPATIENT
Start: 2025-07-10

## 2025-07-10 RX ORDER — FUROSEMIDE 20 MG/1
20 TABLET ORAL DAILY
Qty: 90 TABLET | Refills: 0 | Status: SHIPPED | OUTPATIENT
Start: 2025-07-10

## 2025-07-10 RX ORDER — DILTIAZEM HYDROCHLORIDE 180 MG/1
180 CAPSULE, COATED, EXTENDED RELEASE ORAL DAILY
Qty: 90 CAPSULE | Refills: 0 | Status: SHIPPED | OUTPATIENT
Start: 2025-07-10

## 2025-07-10 RX ORDER — ERGOCALCIFEROL 1.25 MG/1
50000 CAPSULE, LIQUID FILLED ORAL WEEKLY
Qty: 5 CAPSULE | Refills: 1 | Status: SHIPPED | OUTPATIENT
Start: 2025-07-10

## 2025-08-15 ENCOUNTER — OFFICE VISIT (OUTPATIENT)
Dept: FAMILY MEDICINE CLINIC | Age: 65
End: 2025-08-15
Payer: COMMERCIAL

## 2025-08-15 VITALS
WEIGHT: 259 LBS | BODY MASS INDEX: 45.89 KG/M2 | RESPIRATION RATE: 18 BRPM | SYSTOLIC BLOOD PRESSURE: 122 MMHG | HEIGHT: 63 IN | DIASTOLIC BLOOD PRESSURE: 84 MMHG

## 2025-08-15 DIAGNOSIS — I10 HYPERTENSION, UNSPECIFIED TYPE: ICD-10-CM

## 2025-08-15 DIAGNOSIS — E11.9 TYPE 2 DIABETES MELLITUS WITHOUT COMPLICATION, WITHOUT LONG-TERM CURRENT USE OF INSULIN (HCC): Primary | ICD-10-CM

## 2025-08-15 DIAGNOSIS — E03.9 HYPOTHYROIDISM, UNSPECIFIED TYPE: ICD-10-CM

## 2025-08-15 LAB — HBA1C MFR BLD: 7.1 %

## 2025-08-15 PROCEDURE — 3051F HG A1C>EQUAL 7.0%<8.0%: CPT | Performed by: INTERNAL MEDICINE

## 2025-08-15 PROCEDURE — 99214 OFFICE O/P EST MOD 30 MIN: CPT | Performed by: INTERNAL MEDICINE

## 2025-08-15 PROCEDURE — 83036 HEMOGLOBIN GLYCOSYLATED A1C: CPT | Performed by: INTERNAL MEDICINE

## 2025-08-15 PROCEDURE — 3079F DIAST BP 80-89 MM HG: CPT | Performed by: INTERNAL MEDICINE

## 2025-08-15 PROCEDURE — 3074F SYST BP LT 130 MM HG: CPT | Performed by: INTERNAL MEDICINE

## 2025-08-15 RX ORDER — GLIPIZIDE 5 MG/1
5 TABLET, FILM COATED, EXTENDED RELEASE ORAL DAILY
Qty: 90 TABLET | Refills: 1 | Status: SHIPPED | OUTPATIENT
Start: 2025-08-15

## 2025-08-15 RX ORDER — DILTIAZEM HYDROCHLORIDE 180 MG/1
180 CAPSULE, COATED, EXTENDED RELEASE ORAL DAILY
Qty: 90 CAPSULE | Refills: 1 | Status: SHIPPED | OUTPATIENT
Start: 2025-08-15

## 2025-08-15 RX ORDER — LEVOTHYROXINE SODIUM 200 UG/1
200 TABLET ORAL DAILY
Qty: 90 TABLET | Refills: 1 | Status: SHIPPED | OUTPATIENT
Start: 2025-08-15

## 2025-08-15 RX ORDER — ERGOCALCIFEROL 1.25 MG/1
50000 CAPSULE, LIQUID FILLED ORAL WEEKLY
Qty: 5 CAPSULE | Refills: 1 | Status: SHIPPED | OUTPATIENT
Start: 2025-08-15

## 2025-08-15 RX ORDER — FUROSEMIDE 20 MG/1
20 TABLET ORAL DAILY
Qty: 90 TABLET | Refills: 0 | Status: SHIPPED | OUTPATIENT
Start: 2025-08-15

## 2025-08-15 RX ORDER — MELOXICAM 7.5 MG/1
TABLET ORAL
Qty: 180 TABLET | Refills: 3 | Status: SHIPPED | OUTPATIENT
Start: 2025-08-15

## 2025-08-15 SDOH — ECONOMIC STABILITY: FOOD INSECURITY: WITHIN THE PAST 12 MONTHS, THE FOOD YOU BOUGHT JUST DIDN'T LAST AND YOU DIDN'T HAVE MONEY TO GET MORE.: NEVER TRUE

## 2025-08-15 SDOH — ECONOMIC STABILITY: FOOD INSECURITY: WITHIN THE PAST 12 MONTHS, YOU WORRIED THAT YOUR FOOD WOULD RUN OUT BEFORE YOU GOT MONEY TO BUY MORE.: NEVER TRUE

## 2025-08-15 ASSESSMENT — ENCOUNTER SYMPTOMS
SINUS PRESSURE: 1
NAUSEA: 0
FACIAL SWELLING: 1
COUGH: 1
SORE THROAT: 0
SHORTNESS OF BREATH: 0
RHINORRHEA: 1
ABDOMINAL PAIN: 0

## 2025-08-15 ASSESSMENT — PATIENT HEALTH QUESTIONNAIRE - PHQ9
SUM OF ALL RESPONSES TO PHQ QUESTIONS 1-9: 0
2. FEELING DOWN, DEPRESSED OR HOPELESS: NOT AT ALL
SUM OF ALL RESPONSES TO PHQ QUESTIONS 1-9: 0
1. LITTLE INTEREST OR PLEASURE IN DOING THINGS: NOT AT ALL

## (undated) DEVICE — SYRINGE, LUER LOCK, 10ML: Brand: MEDLINE

## (undated) DEVICE — Device

## (undated) DEVICE — COVER,TABLE,HEAVY DUTY,77"X90",STRL: Brand: MEDLINE

## (undated) DEVICE — SOLUTION IV 100ML 0.9% SOD CHL PLAS CONT USP VIAFLX 1 PER

## (undated) DEVICE — DUAL CUT SAGITTAL BLADE

## (undated) DEVICE — 450 ML BOTTLE OF 0.05% CHLORHEXIDINE GLUCONATE IN 99.95% STERILE WATER FOR IRRIGATION, USP AND APPLICATOR.: Brand: IRRISEPT ANTIMICROBIAL WOUND LAVAGE

## (undated) DEVICE — 3M™ STERI-DRAPE™ U-DRAPE 1015: Brand: STERI-DRAPE™

## (undated) DEVICE — VISIONAIRE LEFT CUTTING BLOCK KIT                                    - LEGION PRIMARY: Brand: VISIONAIRE

## (undated) DEVICE — Z DISCONTINUED PER MEDLINE USE 2741943 DRESSING AQUACEL 10 IN ALG W9XL25CM SIL CVR WTRPRF VIR BACT BARR ANTIMIC

## (undated) DEVICE — CANNULA ORAL NSL AD CO2 N INTUB O2 DEL DISP TRU LNK

## (undated) DEVICE — 3M™ IOBAN™ 2 ANTIMICROBIAL INCISE DRAPE 6648EZ: Brand: IOBAN™ 2

## (undated) DEVICE — DECANTER FLD 9IN ST BG FOR ASEP TRNSF OF FLD

## (undated) DEVICE — 3000CC GUARDIAN II: Brand: GUARDIAN

## (undated) DEVICE — NEEDLE SPNL 20GA L3.5IN YEL HUB S STL REG WALL FIT STYL W/

## (undated) DEVICE — SUTURE STRATAFIX SPRL SZ 1 L14IN ABSRB VLT L48CM CTX 1/2 SXPD2B405

## (undated) DEVICE — BANDAGE COMPR W6INXL12FT SMOOTH FOR LIMB EXSANG ESMARCH

## (undated) DEVICE — TUBE IRRIG HNDPC HI FLO TP INTRPULS W/SUCTION TUBE

## (undated) DEVICE — CUFF TOURNIQUET 34 SNG BLADDER DUAL PORT

## (undated) DEVICE — STAPLER EXT SKIN 35 WIDE S STL STPL SQUEEZE HNDL VISISTAT

## (undated) DEVICE — SUTURE VCRL SZ 2-0 L36IN ABSRB UD L40MM CT 1/2 CIR J957H

## (undated) DEVICE — MEDI-VAC NON-CONDUCTIVE TUBING7MM X 30.5 (100FT): Brand: CARDINAL HEALTH

## (undated) DEVICE — SOLUTION IV IRRIG 0.9% NACL 3000ML BAG 2B7477

## (undated) DEVICE — T4 ZIPPER TOGA, (L/XL)

## (undated) DEVICE — KIT EVAC 400CC DIA1/8IN H PAT 12.5IN 3 SPR RND SHP PVC DRN

## (undated) DEVICE — GLOVE ORANGE PI 7 1/2   MSG9075

## (undated) DEVICE — 3 BONE CEMENT MIXER WITH SMALL SPATULA: Brand: MIXEVAC

## (undated) DEVICE — CEMENT MIXING SYSTEM WITH FEMORAL BREAKWAY NOZZLE: Brand: REVOLUTION

## (undated) DEVICE — SOLUTION IV IRRIG POUR BRL 0.9% SODIUM CHL 2F7124

## (undated) DEVICE — SUTURE VCRL SZ 1 L36IN ABSRB UD L36MM CT-1 1/2 CIR J947H

## (undated) DEVICE — PEN: MARKING STD 100/CS: Brand: MEDICAL ACTION INDUSTRIES

## (undated) DEVICE — STERILE PATIENT PROTECTIVE PAD FOR IMP® KNEE POSITIONERS & COHESIVE WRAP (10 / CASE): Brand: DE MAYO KNEE POSITIONER®

## (undated) DEVICE — DISCONTINUED NO SUB IDED TG GLOVE SURG SENSICARE ALOE LT LF PF ST GRN SZ 8